# Patient Record
Sex: MALE | Race: WHITE | NOT HISPANIC OR LATINO | Employment: OTHER | URBAN - METROPOLITAN AREA
[De-identification: names, ages, dates, MRNs, and addresses within clinical notes are randomized per-mention and may not be internally consistent; named-entity substitution may affect disease eponyms.]

---

## 2017-01-17 RX ORDER — METOPROLOL SUCCINATE 50 MG/1
TABLET, EXTENDED RELEASE ORAL
Qty: 90 TABLET | Refills: 0 | Status: SHIPPED | OUTPATIENT
Start: 2017-01-17 | End: 2017-04-07 | Stop reason: SDUPTHER

## 2017-02-06 RX ORDER — LISINOPRIL 20 MG/1
TABLET ORAL
Qty: 90 TABLET | Refills: 0 | Status: SHIPPED | OUTPATIENT
Start: 2017-02-06 | End: 2017-04-07 | Stop reason: SDUPTHER

## 2017-02-06 RX ORDER — CLOPIDOGREL BISULFATE 75 MG/1
TABLET ORAL
Qty: 90 TABLET | Refills: 0 | Status: SHIPPED | OUTPATIENT
Start: 2017-02-06 | End: 2017-04-07 | Stop reason: SDUPTHER

## 2017-03-10 RX ORDER — SIMVASTATIN 40 MG
TABLET ORAL
Qty: 90 TABLET | Refills: 0 | Status: SHIPPED | OUTPATIENT
Start: 2017-03-10 | End: 2017-04-07 | Stop reason: SDUPTHER

## 2017-04-07 ENCOUNTER — OFFICE VISIT (OUTPATIENT)
Dept: FAMILY MEDICINE CLINIC | Facility: CLINIC | Age: 72
End: 2017-04-07

## 2017-04-07 VITALS
HEIGHT: 68 IN | BODY MASS INDEX: 24.25 KG/M2 | HEART RATE: 64 BPM | TEMPERATURE: 98.2 F | OXYGEN SATURATION: 99 % | RESPIRATION RATE: 16 BRPM | WEIGHT: 160 LBS | SYSTOLIC BLOOD PRESSURE: 160 MMHG | DIASTOLIC BLOOD PRESSURE: 80 MMHG

## 2017-04-07 DIAGNOSIS — E11.9 TYPE 2 DIABETES MELLITUS WITHOUT COMPLICATION, WITHOUT LONG-TERM CURRENT USE OF INSULIN (HCC): Primary | ICD-10-CM

## 2017-04-07 DIAGNOSIS — E78.2 MIXED HYPERLIPIDEMIA: ICD-10-CM

## 2017-04-07 DIAGNOSIS — R39.198 SLOW URINARY STREAM: ICD-10-CM

## 2017-04-07 DIAGNOSIS — N32.0 BLADDER NECK OBSTRUCTION: ICD-10-CM

## 2017-04-07 DIAGNOSIS — I10 ESSENTIAL HYPERTENSION: ICD-10-CM

## 2017-04-07 LAB
BILIRUB BLD-MCNC: NEGATIVE MG/DL
CLARITY, POC: CLEAR
COLOR UR: YELLOW
GLUCOSE UR STRIP-MCNC: NEGATIVE MG/DL
KETONES UR QL: ABNORMAL
LEUKOCYTE EST, POC: ABNORMAL
NITRITE UR-MCNC: NEGATIVE MG/ML
PH UR: 6 [PH] (ref 5–8)
PROT UR STRIP-MCNC: ABNORMAL MG/DL
RBC # UR STRIP: ABNORMAL /UL
SP GR UR: 1.03 (ref 1–1.03)
UROBILINOGEN UR QL: NORMAL

## 2017-04-07 PROCEDURE — 99214 OFFICE O/P EST MOD 30 MIN: CPT | Performed by: FAMILY MEDICINE

## 2017-04-07 PROCEDURE — 81003 URINALYSIS AUTO W/O SCOPE: CPT | Performed by: FAMILY MEDICINE

## 2017-04-07 RX ORDER — SIMVASTATIN 40 MG
40 TABLET ORAL NIGHTLY
Qty: 90 TABLET | Refills: 1 | Status: SHIPPED | OUTPATIENT
Start: 2017-04-07 | End: 2017-11-28 | Stop reason: SDUPTHER

## 2017-04-07 RX ORDER — CIPROFLOXACIN 500 MG/1
500 TABLET, FILM COATED ORAL 2 TIMES DAILY
Qty: 10 TABLET | Refills: 0 | Status: SHIPPED | OUTPATIENT
Start: 2017-04-07 | End: 2017-10-04

## 2017-04-07 RX ORDER — LANSOPRAZOLE 15 MG/1
15 CAPSULE, DELAYED RELEASE ORAL DAILY
Qty: 90 CAPSULE | Refills: 1 | Status: SHIPPED | OUTPATIENT
Start: 2017-04-07

## 2017-04-07 RX ORDER — LISINOPRIL 20 MG/1
20 TABLET ORAL DAILY
Qty: 90 TABLET | Refills: 1 | Status: SHIPPED | OUTPATIENT
Start: 2017-04-07 | End: 2017-11-11 | Stop reason: SDUPTHER

## 2017-04-07 RX ORDER — CLOPIDOGREL BISULFATE 75 MG/1
75 TABLET ORAL DAILY
Qty: 90 TABLET | Refills: 1 | Status: SHIPPED | OUTPATIENT
Start: 2017-04-07 | End: 2017-11-11 | Stop reason: SDUPTHER

## 2017-04-07 RX ORDER — METOPROLOL SUCCINATE 50 MG/1
50 TABLET, EXTENDED RELEASE ORAL DAILY
Qty: 90 TABLET | Refills: 1 | Status: SHIPPED | OUTPATIENT
Start: 2017-04-07 | End: 2017-10-06 | Stop reason: SDUPTHER

## 2017-04-07 NOTE — PATIENT INSTRUCTIONS
This is a very nice 71-year-old who is here for follow-up for his high blood pressure and his reflux and his diabetes who also has been experiencing a slow urinary stream lately.  I will request blood work and notify him when the results are available.

## 2017-04-07 NOTE — PROGRESS NOTES
Subjective   Niraj Hassan is a 71 y.o. male presenting with   Chief Complaint   Patient presents with   • Hypertension     6 mo check up    • Hyperlipidemia     6 mo check up    • Diabetes     6 mo check up    • Question     about BPH         HPI Comments: This is a 71-year-old white male smoker who has no interest in quitting who is here for follow-up for diabetes and high cholesterol and high blood pressure and coronary artery disease.  He tells me he is doing well without any problems with his current medication but he has noticed lately that he has a slow stream and some terminal dribbling.  However he does not want to have another prescription for any new medicine and he does not want to see a urologist at this time.  He has no family history of prostate or bladder cancer.    He says he feels well and does not have to get up at night to urinate.  He says his bowel movements are normal as well and he has not noticed any problem there.    Hypertension     Hyperlipidemia     Diabetes          The following portions of the patient's history were reviewed and updated as appropriate: current medications, past family history, past medical history, past social history, past surgical history and problem list.    Review of Systems   Genitourinary: Positive for difficulty urinating.   All other systems reviewed and are negative.      Objective   Physical Exam   Constitutional: He is oriented to person, place, and time. He appears well-developed and well-nourished. No distress.   HENT:   Head: Normocephalic and atraumatic.   Mouth/Throat: Oropharynx is clear and moist. No oropharyngeal exudate.   Eyes: EOM are normal. Pupils are equal, round, and reactive to light. No scleral icterus.   Neck: Normal range of motion. Neck supple. No thyromegaly present.   Cardiovascular: Normal rate, regular rhythm, normal heart sounds and intact distal pulses.  Exam reveals no friction rub.    No murmur heard.  Pulmonary/Chest: Effort  normal. No respiratory distress. He has no wheezes. He has rhonchi in the right upper field and the left upper field.   Abdominal: Soft. Bowel sounds are normal. He exhibits no distension and no mass. There is no tenderness. Hernia confirmed negative in the right inguinal area and confirmed negative in the left inguinal area.   Genitourinary: Prostate normal, testes normal and penis normal. Prostate is not enlarged and not tender. Right testis shows no mass and no tenderness. Left testis shows no mass and no tenderness. Circumcised.   Musculoskeletal: Normal range of motion. He exhibits no edema or tenderness.   Lymphadenopathy:     He has no cervical adenopathy. No inguinal adenopathy noted on the right or left side.   Neurological: He is alert and oriented to person, place, and time. No cranial nerve deficit. Coordination normal.   Skin: Skin is warm and dry. No rash noted. He is not diaphoretic. No pallor.   Psychiatric: He has a normal mood and affect. His behavior is normal.   Nursing note and vitals reviewed.      Assessment/Plan   Niraj was seen today for hypertension, hyperlipidemia, diabetes and question.    Diagnoses and all orders for this visit:    Type 2 diabetes mellitus without complication, without long-term current use of insulin  -     Comprehensive Metabolic Panel  -     Hemoglobin A1c    Slow urinary stream  -     PSA    Essential hypertension  -     Comprehensive Metabolic Panel  -     TSH    Mixed hyperlipidemia  -     Comprehensive Metabolic Panel    Bladder neck obstruction   -     PSA    Other orders  -     clopidogrel (PLAVIX) 75 MG tablet; Take 1 tablet by mouth Daily.  -     lansoprazole (PREVACID) 15 MG capsule; Take 1 capsule by mouth Daily.  -     lisinopril (PRINIVIL,ZESTRIL) 20 MG tablet; Take 1 tablet by mouth Daily.  -     metFORMIN (GLUCOPHAGE) 500 MG tablet; Take 1 tablet by mouth 2 (Two) Times a Day With Meals.  -     metoprolol succinate XL (TOPROL-XL) 50 MG 24 hr tablet; Take  1 tablet by mouth Daily.  -     simvastatin (ZOCOR) 40 MG tablet; Take 1 tablet by mouth Every Night.                   I would like him to return for another visit in 6 month(s)

## 2017-04-08 LAB
ALBUMIN SERPL-MCNC: 4.6 G/DL (ref 3.5–5.2)
ALBUMIN/GLOB SERPL: 1.6 G/DL
ALP SERPL-CCNC: 90 U/L (ref 39–117)
ALT SERPL-CCNC: 14 U/L (ref 1–41)
AST SERPL-CCNC: 15 U/L (ref 1–40)
BILIRUB SERPL-MCNC: 0.4 MG/DL (ref 0.1–1.2)
BUN SERPL-MCNC: 10 MG/DL (ref 8–23)
BUN/CREAT SERPL: 12.8 (ref 7–25)
CALCIUM SERPL-MCNC: 10.2 MG/DL (ref 8.6–10.5)
CHLORIDE SERPL-SCNC: 102 MMOL/L (ref 98–107)
CO2 SERPL-SCNC: 26.8 MMOL/L (ref 22–29)
CREAT SERPL-MCNC: 0.78 MG/DL (ref 0.76–1.27)
GLOBULIN SER CALC-MCNC: 2.9 GM/DL
GLUCOSE SERPL-MCNC: 135 MG/DL (ref 65–99)
HBA1C MFR BLD: 7.56 % (ref 4.8–5.6)
POTASSIUM SERPL-SCNC: 4 MMOL/L (ref 3.5–5.2)
PROT SERPL-MCNC: 7.5 G/DL (ref 6–8.5)
PSA SERPL-MCNC: 1.34 NG/ML (ref 0–4)
SODIUM SERPL-SCNC: 145 MMOL/L (ref 136–145)
TSH SERPL DL<=0.005 MIU/L-ACNC: 1.24 MIU/ML (ref 0.27–4.2)

## 2017-04-09 LAB
BACTERIA UR CULT: NO GROWTH
BACTERIA UR CULT: NORMAL

## 2017-04-10 NOTE — PROGRESS NOTES
Please call the patient regarding his abnormal result.  Spoke to pt informed him of his results he stated he understood

## 2017-07-21 ENCOUNTER — TELEPHONE (OUTPATIENT)
Dept: FAMILY MEDICINE CLINIC | Facility: CLINIC | Age: 72
End: 2017-07-21

## 2017-08-18 ENCOUNTER — TELEPHONE (OUTPATIENT)
Dept: FAMILY MEDICINE CLINIC | Facility: CLINIC | Age: 72
End: 2017-08-18

## 2017-08-18 NOTE — TELEPHONE ENCOUNTER
It should be one tablet twice daily, and when I went to renew it, that is what it shows in Epic.  I don't know how it ended up showing 4 tablets a day at the pharmacy.

## 2017-08-18 NOTE — TELEPHONE ENCOUNTER
Pt called and said that in the past you had him take his metformin bid. He went to  his prescription and now the directions say 2 tab bid, 4 total. He wanted to find out how many he should be taking daily?

## 2017-10-04 ENCOUNTER — OFFICE VISIT (OUTPATIENT)
Dept: FAMILY MEDICINE CLINIC | Facility: CLINIC | Age: 72
End: 2017-10-04

## 2017-10-04 VITALS
OXYGEN SATURATION: 98 % | BODY MASS INDEX: 23.64 KG/M2 | TEMPERATURE: 98.3 F | SYSTOLIC BLOOD PRESSURE: 130 MMHG | WEIGHT: 156 LBS | HEIGHT: 68 IN | HEART RATE: 76 BPM | DIASTOLIC BLOOD PRESSURE: 84 MMHG

## 2017-10-04 DIAGNOSIS — I10 ESSENTIAL HYPERTENSION: Primary | ICD-10-CM

## 2017-10-04 DIAGNOSIS — E78.2 MIXED HYPERLIPIDEMIA: ICD-10-CM

## 2017-10-04 DIAGNOSIS — E11.9 TYPE 2 DIABETES MELLITUS WITHOUT COMPLICATION, WITHOUT LONG-TERM CURRENT USE OF INSULIN (HCC): ICD-10-CM

## 2017-10-04 PROCEDURE — 99213 OFFICE O/P EST LOW 20 MIN: CPT | Performed by: FAMILY MEDICINE

## 2017-10-04 NOTE — PROGRESS NOTES
Subjective   Niraj Hassan is a 72 y.o. male presenting with   Chief Complaint   Patient presents with   • Hyperlipidemia     check up    • Hypertension     check up    • Diabetes     check up         HPI Comments: This is a 72-year-old white male smoker who is not motivated to quit at this time.  He is here for routine follow-up for high blood pressure and high cholesterol and diabetes.  He has eaten today so I cannot order a cholesterol panel.    He tells me that he does not have any side effects from his medication and that he feels well.  He has not had a colonoscopy, but he is willing to do a colon stool test    Hyperlipidemia     Hypertension     Diabetes          The following portions of the patient's history were reviewed and updated as appropriate: current medications, past family history, past medical history, past social history, past surgical history and problem list.    Review of Systems   All other systems reviewed and are negative.      Objective   Physical Exam   Constitutional: He is oriented to person, place, and time. He appears well-developed and well-nourished. No distress.   HENT:   Head: Normocephalic and atraumatic.   Eyes: EOM are normal. Pupils are equal, round, and reactive to light.   Neck: Normal range of motion. Neck supple. No JVD present. No thyromegaly present.   Cardiovascular: Normal rate, regular rhythm, normal heart sounds and intact distal pulses.    No murmur heard.  Pulmonary/Chest: Effort normal. No respiratory distress. He has no wheezes. He has rhonchi in the right upper field, the right middle field and the left upper field. He has no rales.   Abdominal: Soft. Bowel sounds are normal. He exhibits no distension. There is no tenderness.   Musculoskeletal: Normal range of motion. He exhibits no edema or tenderness.   Lymphadenopathy:     He has no cervical adenopathy.   Neurological: He is alert and oriented to person, place, and time. No cranial nerve deficit.  Coordination normal.   Skin: Skin is warm and dry. No rash noted. He is not diaphoretic.   Psychiatric: He has a normal mood and affect. His behavior is normal.   Nursing note and vitals reviewed.      Assessment/Plan   Niraj was seen today for hyperlipidemia, hypertension and diabetes.    Diagnoses and all orders for this visit:    Essential hypertension  -     Comprehensive Metabolic Panel  -     TSH    Mixed hyperlipidemia    Type 2 diabetes mellitus without complication, without long-term current use of insulin  -     Comprehensive Metabolic Panel  -     Hemoglobin A1c                   I would like him to return for another visit in 6 month(s)

## 2017-10-05 LAB
ALBUMIN SERPL-MCNC: 4.9 G/DL (ref 3.5–5.2)
ALBUMIN/GLOB SERPL: 1.6 G/DL
ALP SERPL-CCNC: 86 U/L (ref 39–117)
ALT SERPL-CCNC: 11 U/L (ref 1–41)
AST SERPL-CCNC: 14 U/L (ref 1–40)
BILIRUB SERPL-MCNC: 0.4 MG/DL (ref 0.1–1.2)
BUN SERPL-MCNC: 14 MG/DL (ref 8–23)
BUN/CREAT SERPL: 15.9 (ref 7–25)
CALCIUM SERPL-MCNC: 10.7 MG/DL (ref 8.6–10.5)
CHLORIDE SERPL-SCNC: 101 MMOL/L (ref 98–107)
CO2 SERPL-SCNC: 26.3 MMOL/L (ref 22–29)
CREAT SERPL-MCNC: 0.88 MG/DL (ref 0.76–1.27)
GLOBULIN SER CALC-MCNC: 3 GM/DL
GLUCOSE SERPL-MCNC: 121 MG/DL (ref 65–99)
HBA1C MFR BLD: 7 % (ref 4.8–5.6)
POTASSIUM SERPL-SCNC: 4.7 MMOL/L (ref 3.5–5.2)
PROT SERPL-MCNC: 7.9 G/DL (ref 6–8.5)
SODIUM SERPL-SCNC: 143 MMOL/L (ref 136–145)
TSH SERPL DL<=0.005 MIU/L-ACNC: 1.62 MIU/ML (ref 0.27–4.2)

## 2017-10-06 RX ORDER — METOPROLOL SUCCINATE 50 MG/1
TABLET, EXTENDED RELEASE ORAL
Qty: 90 TABLET | Refills: 1 | Status: SHIPPED | OUTPATIENT
Start: 2017-10-06 | End: 2018-04-06 | Stop reason: SDUPTHER

## 2017-11-13 RX ORDER — CLOPIDOGREL BISULFATE 75 MG/1
TABLET ORAL
Qty: 90 TABLET | Refills: 0 | Status: SHIPPED | OUTPATIENT
Start: 2017-11-13 | End: 2018-02-08 | Stop reason: SDUPTHER

## 2017-11-13 RX ORDER — LISINOPRIL 20 MG/1
TABLET ORAL
Qty: 90 TABLET | Refills: 0 | Status: SHIPPED | OUTPATIENT
Start: 2017-11-13 | End: 2018-02-08 | Stop reason: SDUPTHER

## 2017-11-20 ENCOUNTER — TELEPHONE (OUTPATIENT)
Dept: FAMILY MEDICINE CLINIC | Facility: CLINIC | Age: 72
End: 2017-11-20

## 2017-11-29 RX ORDER — SIMVASTATIN 40 MG
TABLET ORAL
Qty: 90 TABLET | Refills: 1 | Status: SHIPPED | OUTPATIENT
Start: 2017-11-29 | End: 2018-05-31 | Stop reason: SDUPTHER

## 2018-01-03 ENCOUNTER — OFFICE VISIT (OUTPATIENT)
Dept: CARDIOLOGY | Facility: CLINIC | Age: 73
End: 2018-01-03

## 2018-01-03 VITALS
DIASTOLIC BLOOD PRESSURE: 70 MMHG | HEART RATE: 64 BPM | SYSTOLIC BLOOD PRESSURE: 160 MMHG | BODY MASS INDEX: 24.71 KG/M2 | WEIGHT: 163 LBS | HEIGHT: 68 IN

## 2018-01-03 DIAGNOSIS — I25.10 CORONARY ARTERY DISEASE INVOLVING NATIVE CORONARY ARTERY OF NATIVE HEART WITHOUT ANGINA PECTORIS: Primary | ICD-10-CM

## 2018-01-03 DIAGNOSIS — I10 ESSENTIAL HYPERTENSION: ICD-10-CM

## 2018-01-03 DIAGNOSIS — E11.59 TYPE 2 DIABETES MELLITUS WITH OTHER CIRCULATORY COMPLICATION, WITHOUT LONG-TERM CURRENT USE OF INSULIN (HCC): ICD-10-CM

## 2018-01-03 DIAGNOSIS — E78.49 OTHER HYPERLIPIDEMIA: ICD-10-CM

## 2018-01-03 PROCEDURE — 99214 OFFICE O/P EST MOD 30 MIN: CPT | Performed by: INTERNAL MEDICINE

## 2018-01-03 PROCEDURE — 93000 ELECTROCARDIOGRAM COMPLETE: CPT | Performed by: INTERNAL MEDICINE

## 2018-01-03 NOTE — PROGRESS NOTES
Date of Office Visit: 18  Encounter Provider: Brad Swenson MD  Place of Service: [facility] CARDIOLOGY  Patient Name: Niraj Hassan  :1945      Chief Complaint   Patient presents with   • Coronary artery disease involving native coronary artery of      Yearly follow up     History of Present Illness  HPI Comments: The patient is a very pleasant 72-year-old gentleman with a history of previous lateral  wall infarction and primary angioplasty to the circumflex. Repeat catheterization in  10/1998 showed no restenosis. He then had a positive perfusion study in 2006. Cardiac  catheterization showed normal left ventricular systolic function, mild distal left main  stenosis, mild disease of the circumflex and chronically occluded right coronary artery  with collaterals, treated medically. He then had a non-ST elevation infarct, troponin  increasing to 19. This happened around the time he had an abscessed tooth. Repeat  cardiac catheterization at that time showed chronic occluded right coronary artery, total  occlusion of the circumflex which is new, and an ejection fraction of 30%, but so far out  from his infarct that he was treated medically. He had a PET perfusion in 2010 which  was compatible with his disease. He now comes in for follow up.  The patient denies chest pain, pressure and heaviness. No shortness of breath, othopnea or PND. No palpitations, near syncope or syncope. No stroke type symptoms like paralysis, paresthesia, abrupt vision loss and dysarthria. No bleeding like blood in the stool or dark stools.   He still smoking occasionally he is walking on a treadmill to 3 times a week about a mile or 20 minutes.    Coronary Artery Disease   Pertinent negatives include no dizziness, muscle weakness or weight gain. His past medical history is significant for past myocardial infarction.         Past Medical History:   Diagnosis Date   • CAD (coronary artery  disease)    • Diabetes    • GERD (gastroesophageal reflux disease)    • Hyperlipidemia    • Hypertension    • Myocardial infarction    • PVD (peripheral vascular disease)          Past Surgical History:   Procedure Laterality Date   • CARDIAC CATHETERIZATION      showed total occlusion of the RCA, mild left main disease, and moderate disease of the circumflex.   • CORONARY ANGIOPLASTY WITH STENT PLACEMENT      angioplasty and stent placement to the circumflex         Current Outpatient Prescriptions on File Prior to Visit   Medication Sig Dispense Refill   • aspirin 325 MG tablet Take  by mouth daily.     • cetirizine (ZyrTEC) 10 MG tablet Take  by mouth daily.     • clopidogrel (PLAVIX) 75 MG tablet TAKE ONE TABLET BY MOUTH DAILY 90 tablet 0   • lansoprazole (PREVACID) 15 MG capsule Take 1 capsule by mouth Daily. 90 capsule 1   • lisinopril (PRINIVIL,ZESTRIL) 20 MG tablet TAKE ONE TABLET BY MOUTH DAILY 90 tablet 0   • Melatonin 10 MG capsule Take  by mouth.     • metFORMIN (GLUCOPHAGE) 500 MG tablet Take 1 tablet by mouth 2 (Two) Times a Day With Meals. 180 tablet 1   • metFORMIN (GLUCOPHAGE) 500 MG tablet TAKE TWO TABLETS BY MOUTH TWICE A DAY WITH MEALS 120 tablet 2   • metoprolol succinate XL (TOPROL-XL) 50 MG 24 hr tablet TAKE ONE TABLET BY MOUTH DAILY 90 tablet 1   • Misc Natural Products (TART CHERRY ADVANCED) capsule Take  by mouth daily.     • Multiple Vitamin (MULTIVITAMIN) capsule Take  by mouth daily.     • neomycin-polymyxin-hydrocortisone (CORTISPORIN) 3.5-29810-6 otic solution Administer 3 drops to the right ear 4 (Four) Times a Day. 10 mL 0   • Omega-3 Fatty Acids (FISH OIL) 1200 MG capsule capsule Take  by mouth.     • simvastatin (ZOCOR) 40 MG tablet TAKE 1 TABLET BY MOUTH EVERY NIGHT 90 tablet 1   • Triamcinolone Acetonide (NASACORT) 55 MCG/ACT nasal inhaler 2 sprays into each nostril daily.       No current facility-administered medications on file prior to visit.          Social History      Social History   • Marital status:      Spouse name: N/A   • Number of children: N/A   • Years of education: N/A     Occupational History   • Not on file.     Social History Main Topics   • Smoking status: Current Some Day Smoker   • Smokeless tobacco: Not on file   • Alcohol use No      Comment: Caffeine use   • Drug use: No   • Sexual activity: Not on file     Other Topics Concern   • Not on file     Social History Narrative       Family History   Problem Relation Age of Onset   • Heart attack Mother    • Cancer Mother    • Heart disease Mother      PACEMAKER   • Heart attack Father          Review of Systems   Constitution: Negative for decreased appetite, diaphoresis, fever, weakness, malaise/fatigue, weight gain and weight loss.   HENT: Negative for congestion, hearing loss, nosebleeds and tinnitus.    Eyes: Negative for blurred vision, double vision, vision loss in left eye, vision loss in right eye and visual disturbance.   Cardiovascular:        As noted in HPI   Respiratory:        As noted HPI   Endocrine: Negative for cold intolerance and heat intolerance.   Hematologic/Lymphatic: Negative for bleeding problem. Does not bruise/bleed easily.   Skin: Negative for color change, flushing, itching and rash.   Musculoskeletal: Negative for arthritis, back pain, joint pain, joint swelling, muscle weakness and myalgias.   Gastrointestinal: Negative for bloating, abdominal pain, constipation, diarrhea, dysphagia, heartburn, hematemesis, hematochezia, melena, nausea and vomiting.   Genitourinary: Negative for bladder incontinence, dysuria, frequency, nocturia and urgency.   Neurological: Negative for dizziness, focal weakness, headaches, light-headedness, loss of balance, numbness, paresthesias and vertigo.   Psychiatric/Behavioral: Negative for depression, memory loss and substance abuse.       Procedures      ECG 12 Lead  Date/Time: 1/3/2018 1:42 PM  Performed by: MIRACLE GHOTRA  Authorized by:  "MIRACLE GHOTRA   Comparison: compared with previous ECG   Similar to previous ECG  Rhythm: sinus rhythm  Rate: normal  Conduction: incomplete RBBB  QRS axis: normal                 Objective:    /70 (BP Location: Right arm)  Pulse 64  Ht 172.7 cm (68\")  Wt 73.9 kg (163 lb)  BMI 24.78 kg/m2       Physical Exam  Physical Exam   Constitutional: He is oriented to person, place, and time. He appears well-developed and well-nourished. No distress.   HENT:   Head: Normocephalic.   Eyes: Conjunctivae are normal. Pupils are equal, round, and reactive to light. No scleral icterus.   Neck: Normal carotid pulses, no hepatojugular reflux and no JVD present. Carotid bruit is not present. No tracheal deviation, no edema and no erythema present. No thyromegaly present.   Cardiovascular: Normal rate, regular rhythm, S1 normal, S2 normal, normal heart sounds and intact distal pulses.   No extrasystoles are present. PMI is not displaced.  Exam reveals no gallop, no distant heart sounds and no friction rub.    No murmur heard.  Pulses:       Carotid pulses are 2+ on the right side, and 2+ on the left side.       Radial pulses are 2+ on the right side, and 2+ on the left side.        Femoral pulses are 2+ on the right side, and 2+ on the left side.       Dorsalis pedis pulses are 2+ on the right side, and 2+ on the left side.        Posterior tibial pulses are 2+ on the right side, and 2+ on the left side.   Pulmonary/Chest: Effort normal and breath sounds normal. No respiratory distress. He has no decreased breath sounds. He has no wheezes. He has no rhonchi. He has no rales. He exhibits no tenderness.   Abdominal: Soft. Bowel sounds are normal. He exhibits no distension and no mass. There is no hepatosplenomegaly. There is no tenderness. There is no rebound and no guarding.   Musculoskeletal: He exhibits no edema, tenderness or deformity.   Neurological: He is alert and oriented to person, place, and time.   Skin: Skin is " warm and dry. No rash noted. He is not diaphoretic. No cyanosis or erythema. No pallor. Nails show no clubbing.   Psychiatric: He has a normal mood and affect. His speech is normal and behavior is normal. Judgment and thought content normal.           Assessment:   1. This is a 72-year-old gentleman with a history of previous lateral wall infarction, primary angioplasty of the circumflex. Followup catheterization in 08/2006 showed total  occlusive right carotid artery, mild left main disease, and mild disease of the circumflex. Then, recurrent non-ST elevation in 11/2009. Catheterization showed moderate  left anterior dysfunction with a left ejection fraction of 30%, insignificant disease of the LAD, total occlusion of the proximal circumflex, and total occlusion of the right  coronary artery. The circumflex was a new occlusion. Then, he had a PET perfusion study in 8/13 that was unchanged.   Coronary Artery Disease  Assessment  • The patient has no angina    Plan  • Lifestyle modifications discussed include adhering to a heart healthy diet, avoidance of tobacco products, maintenance of a healthy weight, medication compliance, regular exercise and regular monitoring of cholesterol and blood pressure    Subjective - Objective  • There is a history of past MI  • There has been a previous POBA  • Current antiplatelet therapy includes aspirin 81 mg and clopidogrel 75 mg    He seems to be doing well. He is to continue the same and see us in follow up in one year. He is going to continue to try to stop cigarette smoking and increase his exercise regimen.     2. Hyperlipidemia.   he is to have follow-up lipids by his PCP in April of this year.  Target LDL of 70 or less.  If not at that goal would consider switching to atorvastatin.  3. History of peripheral vascular disease, bilateral iliac disease, occlusion of the right superficial femoral artery, moderate disease of the left superficial femoral artery. He is  doing well  with no claudication. Continue the same.  Maintain him on clopidogrel indefinitely.  4. Hypertension. Blood pressure is typically controlled he says.     Plan:

## 2018-02-08 RX ORDER — LISINOPRIL 20 MG/1
TABLET ORAL
Qty: 90 TABLET | Refills: 0 | Status: SHIPPED | OUTPATIENT
Start: 2018-02-08 | End: 2018-02-14

## 2018-02-08 RX ORDER — CLOPIDOGREL BISULFATE 75 MG/1
TABLET ORAL
Qty: 90 TABLET | Refills: 0 | Status: SHIPPED | OUTPATIENT
Start: 2018-02-08 | End: 2018-05-10 | Stop reason: SDUPTHER

## 2018-02-14 ENCOUNTER — OFFICE VISIT (OUTPATIENT)
Dept: FAMILY MEDICINE CLINIC | Facility: CLINIC | Age: 73
End: 2018-02-14

## 2018-02-14 VITALS
OXYGEN SATURATION: 97 % | SYSTOLIC BLOOD PRESSURE: 154 MMHG | HEART RATE: 72 BPM | BODY MASS INDEX: 24.43 KG/M2 | DIASTOLIC BLOOD PRESSURE: 82 MMHG | WEIGHT: 161.2 LBS | HEIGHT: 68 IN

## 2018-02-14 DIAGNOSIS — I10 ESSENTIAL HYPERTENSION: Primary | ICD-10-CM

## 2018-02-14 DIAGNOSIS — E11.9 TYPE 2 DIABETES MELLITUS WITHOUT COMPLICATION, WITHOUT LONG-TERM CURRENT USE OF INSULIN (HCC): ICD-10-CM

## 2018-02-14 DIAGNOSIS — M54.31 SCIATICA OF RIGHT SIDE: ICD-10-CM

## 2018-02-14 PROCEDURE — 99213 OFFICE O/P EST LOW 20 MIN: CPT | Performed by: FAMILY MEDICINE

## 2018-02-14 RX ORDER — LISINOPRIL AND HYDROCHLOROTHIAZIDE 20; 12.5 MG/1; MG/1
1 TABLET ORAL DAILY
Qty: 30 TABLET | Refills: 5 | Status: SHIPPED | OUTPATIENT
Start: 2018-02-14 | End: 2018-05-14 | Stop reason: SDUPTHER

## 2018-02-14 RX ORDER — METHYLPREDNISOLONE 4 MG/1
TABLET ORAL
Qty: 21 TABLET | Refills: 0 | Status: SHIPPED | OUTPATIENT
Start: 2018-02-14 | End: 2018-04-13

## 2018-02-14 NOTE — PROGRESS NOTES
Subjective   Niraj Hassan is a 72 y.o. male presenting with   Chief Complaint   Patient presents with   • Leg Pain     Stabbing pain on occasion       Had sciatica many years ago that left numb are in leg   That area has enlarged         HPI Comments: 72-year-old white male smoker who is not motivated at this time to quit, but comes in today for complaint of right sided stabbing pain radiating from the buttock down the posterior right leg with a numb area over the lateral right thigh.  He said he had a really bad episode of sciatica many years ago and for 3 months was in a lot of pain.  He says he hopes he can avert that this time around.    He tells me he is taking his blood pressure medicine regularly without any problems or side effects, but his blood pressure is a little elevated today.  I took his blood pressure and his right arm sitting and got 154/82 and then took it in his left arm and got 156/80.  He says that he was surprised by the blood pressure reading initially gotten here, but he had just walked down the long solorio.  He also would smoke a cigarette right before he came up to the office.  I did tell him that cigarettes elevate blood pressure in addition to all the other problems the cause.    Leg Pain    Associated symptoms include numbness (right lateral thigh numbness).        The following portions of the patient's history were reviewed and updated as appropriate: current medications, past family history, past medical history, past social history, past surgical history and problem list.    Review of Systems   Neurological: Positive for numbness (right lateral thigh numbness).   All other systems reviewed and are negative.      Objective   Physical Exam   Constitutional: He is oriented to person, place, and time. He appears well-developed and well-nourished. No distress.   HENT:   Head: Normocephalic and atraumatic.   Eyes: EOM are normal. Pupils are equal, round, and reactive to light.   Neck:  Normal range of motion. Neck supple. No JVD present. No thyromegaly present.   Cardiovascular: Normal rate, regular rhythm, normal heart sounds and intact distal pulses.    No murmur heard.  Pulmonary/Chest: Effort normal. No respiratory distress. He has no wheezes. He has rhonchi in the right upper field and the left upper field.   Musculoskeletal: He exhibits tenderness (he has mild tenderness to firm palpation over the right gluteal area but negative straight leg raising). He exhibits no edema or deformity.   Lymphadenopathy:     He has no cervical adenopathy.   Neurological: He is alert and oriented to person, place, and time. No cranial nerve deficit. Coordination normal.   Skin: Skin is warm and dry. He is not diaphoretic.   Psychiatric: He has a normal mood and affect. His behavior is normal.   Nursing note and vitals reviewed.      Assessment/Plan   Niraj was seen today for leg pain.    Diagnoses and all orders for this visit:    Essential hypertension  -     Comprehensive Metabolic Panel  -     TSH    Type 2 diabetes mellitus without complication, without long-term current use of insulin  -     Comprehensive Metabolic Panel  -     Hemoglobin A1c    Sciatica of right side    Other orders  -     lisinopril-hydrochlorothiazide (ZESTORETIC) 20-12.5 MG per tablet; Take 1 tablet by mouth Daily.  -     MethylPREDNISolone (MEDROL, SHANTANU,) 4 MG tablet; Take as directed on package instructions.                   I would like him to return for another visit in 6 month(s)

## 2018-02-14 NOTE — PATIENT INSTRUCTIONS
This is a very nice 72-year-old who is here for treatment of his sciatica but also is here for follow-up for his blood pressure and blood sugar.  I will request blood work and notify him when the results are available.  I would like him to call if there is any problem.      Also, I got a blood pressure reading of 154/82 which is still not ideal, so I will ask him to discontinue the lisinopril and I will send out lisinopril with hydrochlorothiazide instead.

## 2018-02-15 LAB
ALBUMIN SERPL-MCNC: 4.5 G/DL (ref 3.5–5.2)
ALBUMIN/GLOB SERPL: 1.7 G/DL
ALP SERPL-CCNC: 90 U/L (ref 39–117)
ALT SERPL-CCNC: 16 U/L (ref 1–41)
AST SERPL-CCNC: 17 U/L (ref 1–40)
BILIRUB SERPL-MCNC: 0.3 MG/DL (ref 0.1–1.2)
BUN SERPL-MCNC: 16 MG/DL (ref 8–23)
BUN/CREAT SERPL: 20.3 (ref 7–25)
CALCIUM SERPL-MCNC: 9.7 MG/DL (ref 8.6–10.5)
CHLORIDE SERPL-SCNC: 101 MMOL/L (ref 98–107)
CO2 SERPL-SCNC: 25 MMOL/L (ref 22–29)
CREAT SERPL-MCNC: 0.79 MG/DL (ref 0.76–1.27)
GFR SERPLBLD CREATININE-BSD FMLA CKD-EPI: 117 ML/MIN/1.73
GFR SERPLBLD CREATININE-BSD FMLA CKD-EPI: 97 ML/MIN/1.73
GLOBULIN SER CALC-MCNC: 2.6 GM/DL
GLUCOSE SERPL-MCNC: 155 MG/DL (ref 65–99)
HBA1C MFR BLD: 7.74 % (ref 4.8–5.6)
POTASSIUM SERPL-SCNC: 4.3 MMOL/L (ref 3.5–5.2)
PROT SERPL-MCNC: 7.1 G/DL (ref 6–8.5)
SODIUM SERPL-SCNC: 142 MMOL/L (ref 136–145)
TSH SERPL DL<=0.005 MIU/L-ACNC: 1.6 MIU/ML (ref 0.27–4.2)

## 2018-04-06 RX ORDER — METOPROLOL SUCCINATE 50 MG/1
TABLET, EXTENDED RELEASE ORAL
Qty: 90 TABLET | Refills: 3 | Status: SHIPPED | OUTPATIENT
Start: 2018-04-06 | End: 2019-04-10 | Stop reason: SDUPTHER

## 2018-05-10 RX ORDER — LISINOPRIL 20 MG/1
TABLET ORAL
Qty: 90 TABLET | Refills: 0 | Status: SHIPPED | OUTPATIENT
Start: 2018-05-10 | End: 2018-08-20 | Stop reason: CLARIF

## 2018-05-10 RX ORDER — CLOPIDOGREL BISULFATE 75 MG/1
TABLET ORAL
Qty: 90 TABLET | Refills: 0 | Status: SHIPPED | OUTPATIENT
Start: 2018-05-10 | End: 2018-08-20 | Stop reason: SDUPTHER

## 2018-05-14 ENCOUNTER — TELEPHONE (OUTPATIENT)
Dept: FAMILY MEDICINE CLINIC | Facility: CLINIC | Age: 73
End: 2018-05-14

## 2018-05-14 RX ORDER — LISINOPRIL AND HYDROCHLOROTHIAZIDE 20; 12.5 MG/1; MG/1
1 TABLET ORAL DAILY
Qty: 30 TABLET | Refills: 5 | Status: SHIPPED | OUTPATIENT
Start: 2018-05-14 | End: 2019-01-29 | Stop reason: SDUPTHER

## 2018-05-14 NOTE — TELEPHONE ENCOUNTER
[facility] called and said that lisinopril 20mg was filled but in February it was changed to lisinopril-hctz   could you please clarify this with dr. Linton

## 2018-05-31 RX ORDER — SIMVASTATIN 40 MG
TABLET ORAL
Qty: 90 TABLET | Refills: 1 | Status: SHIPPED | OUTPATIENT
Start: 2018-05-31 | End: 2018-11-30 | Stop reason: SDUPTHER

## 2018-08-09 RX ORDER — CLOPIDOGREL BISULFATE 75 MG/1
TABLET ORAL
Qty: 90 TABLET | Refills: 0 | Status: SHIPPED | OUTPATIENT
Start: 2018-08-09 | End: 2018-11-04 | Stop reason: SDUPTHER

## 2018-08-20 ENCOUNTER — OFFICE VISIT (OUTPATIENT)
Dept: FAMILY MEDICINE CLINIC | Facility: CLINIC | Age: 73
End: 2018-08-20

## 2018-08-20 VITALS
RESPIRATION RATE: 18 BRPM | OXYGEN SATURATION: 100 % | WEIGHT: 156 LBS | SYSTOLIC BLOOD PRESSURE: 128 MMHG | TEMPERATURE: 97.5 F | HEIGHT: 68 IN | DIASTOLIC BLOOD PRESSURE: 70 MMHG | HEART RATE: 68 BPM | BODY MASS INDEX: 23.64 KG/M2

## 2018-08-20 DIAGNOSIS — Z00.00 MEDICARE ANNUAL WELLNESS VISIT, INITIAL: Primary | ICD-10-CM

## 2018-08-20 DIAGNOSIS — E11.9 TYPE 2 DIABETES MELLITUS WITHOUT COMPLICATION, WITHOUT LONG-TERM CURRENT USE OF INSULIN (HCC): ICD-10-CM

## 2018-08-20 DIAGNOSIS — I10 ESSENTIAL HYPERTENSION: ICD-10-CM

## 2018-08-20 DIAGNOSIS — E78.2 MIXED HYPERLIPIDEMIA: ICD-10-CM

## 2018-08-20 PROCEDURE — G0439 PPPS, SUBSEQ VISIT: HCPCS | Performed by: FAMILY MEDICINE

## 2018-08-20 PROCEDURE — 96160 PT-FOCUSED HLTH RISK ASSMT: CPT | Performed by: FAMILY MEDICINE

## 2018-08-20 NOTE — PROGRESS NOTES
QUICK REFERENCE INFORMATION:  The ABCs of the Annual Wellness Visit    Subsequent Medicare Wellness Visit    HEALTH RISK ASSESSMENT    1945    Recent Hospitalizations:  No hospitalization(s) within the last year..        Current Medical Providers:  Patient Care Team:  Richard Linton MD as PCP - General        Smoking Status:  History   Smoking Status   • Current Some Day Smoker   Smokeless Tobacco   • Never Used       Alcohol Consumption:  History   Alcohol Use   • Yes     Comment: / socially       caffine also       Depression Screen:   PHQ-2/PHQ-9 Depression Screening 8/20/2018   Little interest or pleasure in doing things 0   Feeling down, depressed, or hopeless 0   Total Score 0       Health Habits and Functional and Cognitive Screening:  Functional & Cognitive Status 8/20/2018   Do you have difficulty preparing food and eating? No   Do you have difficulty bathing yourself, getting dressed or grooming yourself? No   Do you have difficulty using the toilet? No   Do you have difficulty moving around from place to place? No   Do you have trouble with steps or getting out of a bed or a chair? No   In the past year have you fallen or experienced a near fall? No   Current Diet Well Balanced Diet   Dental Exam Up to date   Eye Exam Up to date   Exercise (times per week) 7 times per week   Do you need help using the phone?  No   Are you deaf or do you have serious difficulty hearing?  No   Do you need help with transportation? No   Do you need help shopping? No   Do you need help preparing meals?  No   Do you need help with housework?  No   Do you need help with laundry? No   Do you need help taking your medications? No   Do you need help managing money? No   Do you ever drive or ride in a car without wearing a seat belt? No   Have you felt unusual stress, anger or loneliness in the last month? No   Who do you live with? Alone   If you need help, do you have trouble finding someone available to you? No   Have  you been bothered in the last four weeks by sexual problems? No   Do you have difficulty concentrating, remembering or making decisions? No           Does the patient have evidence of cognitive impairment? No    Aspirin use counseling: Taking ASA appropriately as indicated      Recent Lab Results:  CMP:  Lab Results   Component Value Date     (H) 02/14/2018    BUN 16 02/14/2018    CREATININE 0.79 02/14/2018    EGFRIFNONA 97 02/14/2018    EGFRIFAFRI 117 02/14/2018    BCR 20.3 02/14/2018     02/14/2018    K 4.3 02/14/2018    CO2 25.0 02/14/2018    CALCIUM 9.7 02/14/2018    PROTENTOTREF 7.1 02/14/2018    ALBUMIN 4.50 02/14/2018    LABGLOBREF 2.6 02/14/2018    LABIL2 1.7 02/14/2018    BILITOT 0.3 02/14/2018    ALKPHOS 90 02/14/2018    AST 17 02/14/2018    ALT 16 02/14/2018     Lipid Panel:  Lab Results   Component Value Date    TRIG 228 (H) 03/18/2015    HDL 34 (L) 03/18/2015    VLDL 46 03/18/2015    LDLHDL 1.8 03/18/2015     HbA1c:  Lab Results   Component Value Date    HGBA1C 7.74 (H) 02/14/2018       Visual Acuity:  No exam data present    Age-appropriate Screening Schedule:  Refer to the list below for future screening recommendations based on patient's age, sex and/or medical conditions. Orders for these recommended tests are listed in the plan section. The patient has been provided with a written plan.    Health Maintenance   Topic Date Due   • URINE MICROALBUMIN  1945   • TDAP/TD VACCINES (1 - Tdap) 07/24/1965   • ZOSTER VACCINE (1 of 2) 07/24/1996   • PNEUMOCOCCAL VACCINES (65+ LOW/MEDIUM RISK) (1 of 2 - PCV13) 07/24/2011   • DIABETIC FOOT EXAM  02/16/2016   • COLONOSCOPY  03/21/2016   • LIPID PANEL  09/19/2016   • HEMOGLOBIN A1C  08/14/2018   • INFLUENZA VACCINE  08/01/2018   • DIABETIC EYE EXAM  04/09/2019        Subjective   History of Present Illness    Niraj Hassan is a 73 y.o. male who presents for an Subsequent Wellness Visit.    The following portions of the patient's history were  reviewed and updated as appropriate: current medications, past family history, past medical history, past social history, past surgical history and problem list.    Outpatient Medications Prior to Visit   Medication Sig Dispense Refill   • cetirizine (ZyrTEC) 10 MG tablet Take  by mouth daily.     • clopidogrel (PLAVIX) 75 MG tablet TAKE ONE TABLET BY MOUTH DAILY 90 tablet 0   • lansoprazole (PREVACID) 15 MG capsule Take 1 capsule by mouth Daily. 90 capsule 1   • lisinopril-hydrochlorothiazide (ZESTORETIC) 20-12.5 MG per tablet Take 1 tablet by mouth Daily. 30 tablet 5   • Melatonin 10 MG capsule Take  by mouth.     • metFORMIN (GLUCOPHAGE) 500 MG tablet Take 1 tablet by mouth 2 (Two) Times a Day With Meals. 180 tablet 1   • metoprolol succinate XL (TOPROL-XL) 50 MG 24 hr tablet TAKE ONE TABLET BY MOUTH DAILY 90 tablet 3   • Misc Natural Products (TART CHERRY ADVANCED) capsule Take  by mouth daily.     • Multiple Vitamin (MULTIVITAMIN) capsule Take  by mouth daily.     • Omega-3 Fatty Acids (FISH OIL) 1200 MG capsule capsule Take  by mouth.     • simvastatin (ZOCOR) 40 MG tablet TAKE ONE TABLET BY MOUTH ONCE NIGHTLY 90 tablet 1   • Triamcinolone Acetonide (NASACORT) 55 MCG/ACT nasal inhaler 2 sprays into each nostril daily.     • aspirin 325 MG tablet Take  by mouth daily.     • clopidogrel (PLAVIX) 75 MG tablet TAKE ONE TABLET BY MOUTH DAILY 90 tablet 0   • lisinopril (PRINIVIL,ZESTRIL) 20 MG tablet TAKE ONE TABLET BY MOUTH DAILY 90 tablet 0     No facility-administered medications prior to visit.        Patient Active Problem List   Diagnosis   • Essential hypertension   • Mixed hyperlipidemia   • Type 2 diabetes mellitus without complication, without long-term current use of insulin (CMS/[facility])   • Slow urinary stream   • Sciatica of right side   • Medicare annual wellness visit, initial       Advance Care Planning:  has NO advance directive - information provided to the patient today    Identification of Risk  "Factors:  Risk factors include: cardiovascular risk and tobacco use.    Review of Systems   All other systems reviewed and are negative.      Compared to one year ago, the patient feels his physical health is the same.  Compared to one year ago, the patient feels his mental health is the same.    Objective     Physical Exam   Constitutional: He is oriented to person, place, and time. He appears well-developed and well-nourished.   HENT:   Head: Normocephalic and atraumatic.   Mouth/Throat: Oropharynx is clear and moist. No oropharyngeal exudate.   Eyes: Pupils are equal, round, and reactive to light. EOM are normal. No scleral icterus.   Neck: Normal range of motion. Neck supple. No JVD present. No thyromegaly present.   Cardiovascular: Normal rate, regular rhythm, normal heart sounds and intact distal pulses.    No murmur heard.  Pulmonary/Chest: Effort normal. No respiratory distress. He has wheezes. He has no rales. He exhibits no tenderness.   Abdominal: Soft. Bowel sounds are normal. He exhibits no distension.   Musculoskeletal: Normal range of motion. He exhibits no edema or tenderness.   Lymphadenopathy:     He has no cervical adenopathy.   Neurological: He is alert and oriented to person, place, and time.   Skin: Skin is warm and dry.   Psychiatric: He has a normal mood and affect. His behavior is normal.   Nursing note and vitals reviewed.      Vitals:    08/20/18 1257   BP: 128/70   Pulse: 68   Resp: 18   Temp: 97.5 °F (36.4 °C)   SpO2: 100%   Weight: 70.8 kg (156 lb)   Height: 172.7 cm (68\")   PainSc: 0-No pain       Patient's Body mass index is 23.72 kg/m². BMI is within normal parameters. No follow-up required.      Assessment/Plan   Patient Self-Management and Personalized Health Advice  The patient has been provided with information about: diet, exercise, tobacco cessation and designing advance directives and preventive services including:   · Advance directive, Diabetes screening, see lab orders, " Smoking cessation counseling completed.    Visit Diagnoses:    ICD-10-CM ICD-9-CM   1. Medicare annual wellness visit, initial Z00.00 V70.0   2. Essential hypertension I10 401.9   3. Mixed hyperlipidemia E78.2 272.2   4. Type 2 diabetes mellitus without complication, without long-term current use of insulin (CMS/[facility]) E11.9 250.00       Orders Placed This Encounter   Procedures   • Comprehensive Metabolic Panel   • Lipid Panel With LDL / HDL Ratio   • TSH   • Hemoglobin A1c   • Hepatitis C antibody       Outpatient Encounter Prescriptions as of 8/20/2018   Medication Sig Dispense Refill   • cetirizine (ZyrTEC) 10 MG tablet Take  by mouth daily.     • clopidogrel (PLAVIX) 75 MG tablet TAKE ONE TABLET BY MOUTH DAILY 90 tablet 0   • lansoprazole (PREVACID) 15 MG capsule Take 1 capsule by mouth Daily. 90 capsule 1   • lisinopril-hydrochlorothiazide (ZESTORETIC) 20-12.5 MG per tablet Take 1 tablet by mouth Daily. 30 tablet 5   • Melatonin 10 MG capsule Take  by mouth.     • metFORMIN (GLUCOPHAGE) 500 MG tablet Take 1 tablet by mouth 2 (Two) Times a Day With Meals. 180 tablet 1   • metoprolol succinate XL (TOPROL-XL) 50 MG 24 hr tablet TAKE ONE TABLET BY MOUTH DAILY 90 tablet 3   • Misc Natural Products (TART CHERRY ADVANCED) capsule Take  by mouth daily.     • Multiple Vitamin (MULTIVITAMIN) capsule Take  by mouth daily.     • Omega-3 Fatty Acids (FISH OIL) 1200 MG capsule capsule Take  by mouth.     • simvastatin (ZOCOR) 40 MG tablet TAKE ONE TABLET BY MOUTH ONCE NIGHTLY 90 tablet 1   • Triamcinolone Acetonide (NASACORT) 55 MCG/ACT nasal inhaler 2 sprays into each nostril daily.     • aspirin 325 MG tablet Take  by mouth daily.     • [DISCONTINUED] clopidogrel (PLAVIX) 75 MG tablet TAKE ONE TABLET BY MOUTH DAILY 90 tablet 0   • [DISCONTINUED] lisinopril (PRINIVIL,ZESTRIL) 20 MG tablet TAKE ONE TABLET BY MOUTH DAILY 90 tablet 0     No facility-administered encounter medications on file as of 8/20/2018.        Reviewed use  of high risk medication in the elderly: not applicable  Reviewed for potential of harmful drug interactions in the elderly: not applicable    Follow Up:  Return in about 6 months (around 2/20/2019) for Recheck.     An After Visit Summary and PPPS with all of these plans were given to the patient.

## 2018-08-20 NOTE — PATIENT INSTRUCTIONS
This is a very nice 73-year-old who is here for his Medicare visit and also follow-up for his cholesterol and blood pressure.  I will request blood work and notify him when the results are available.

## 2018-08-21 LAB
ALBUMIN SERPL-MCNC: 4.8 G/DL (ref 3.5–5.2)
ALBUMIN/GLOB SERPL: 2.1 G/DL
ALP SERPL-CCNC: 81 U/L (ref 39–117)
ALT SERPL-CCNC: 12 U/L (ref 1–41)
AST SERPL-CCNC: 9 U/L (ref 1–40)
BILIRUB SERPL-MCNC: 0.3 MG/DL (ref 0.1–1.2)
BUN SERPL-MCNC: 15 MG/DL (ref 8–23)
BUN/CREAT SERPL: 16.5 (ref 7–25)
CALCIUM SERPL-MCNC: 9.4 MG/DL (ref 8.6–10.5)
CHLORIDE SERPL-SCNC: 102 MMOL/L (ref 98–107)
CHOLEST SERPL-MCNC: 175 MG/DL (ref 0–200)
CO2 SERPL-SCNC: 24.2 MMOL/L (ref 22–29)
CREAT SERPL-MCNC: 0.91 MG/DL (ref 0.76–1.27)
GLOBULIN SER CALC-MCNC: 2.3 GM/DL
GLUCOSE SERPL-MCNC: 186 MG/DL (ref 65–99)
HBA1C MFR BLD: 7.43 % (ref 4.8–5.6)
HCV AB S/CO SERPL IA: <0.1 S/CO RATIO (ref 0–0.9)
HDLC SERPL-MCNC: 34 MG/DL (ref 40–60)
LDLC SERPL CALC-MCNC: 86 MG/DL (ref 0–100)
LDLC/HDLC SERPL: 2.52 {RATIO}
POTASSIUM SERPL-SCNC: 4.3 MMOL/L (ref 3.5–5.2)
PROT SERPL-MCNC: 7.1 G/DL (ref 6–8.5)
SODIUM SERPL-SCNC: 143 MMOL/L (ref 136–145)
TRIGL SERPL-MCNC: 277 MG/DL (ref 0–150)
TSH SERPL DL<=0.005 MIU/L-ACNC: 1.6 MIU/ML (ref 0.27–4.2)
VLDLC SERPL CALC-MCNC: 55.4 MG/DL (ref 5–40)

## 2018-11-05 RX ORDER — CLOPIDOGREL BISULFATE 75 MG/1
TABLET ORAL
Qty: 90 TABLET | Refills: 0 | Status: SHIPPED | OUTPATIENT
Start: 2018-11-05 | End: 2019-02-06 | Stop reason: SDUPTHER

## 2018-11-30 RX ORDER — SIMVASTATIN 40 MG
TABLET ORAL
Qty: 90 TABLET | Refills: 0 | Status: SHIPPED | OUTPATIENT
Start: 2018-11-30 | End: 2019-03-06 | Stop reason: SDUPTHER

## 2019-01-10 ENCOUNTER — OFFICE VISIT (OUTPATIENT)
Dept: CARDIOLOGY | Facility: CLINIC | Age: 74
End: 2019-01-10

## 2019-01-10 VITALS
DIASTOLIC BLOOD PRESSURE: 80 MMHG | BODY MASS INDEX: 23.49 KG/M2 | SYSTOLIC BLOOD PRESSURE: 134 MMHG | HEART RATE: 60 BPM | WEIGHT: 155 LBS | HEIGHT: 68 IN

## 2019-01-10 DIAGNOSIS — E11.59 TYPE 2 DIABETES MELLITUS WITH OTHER CIRCULATORY COMPLICATION, WITHOUT LONG-TERM CURRENT USE OF INSULIN (HCC): ICD-10-CM

## 2019-01-10 DIAGNOSIS — E78.2 MIXED HYPERLIPIDEMIA: ICD-10-CM

## 2019-01-10 DIAGNOSIS — I25.10 CORONARY ARTERY DISEASE INVOLVING NATIVE CORONARY ARTERY OF NATIVE HEART WITHOUT ANGINA PECTORIS: Primary | ICD-10-CM

## 2019-01-10 DIAGNOSIS — I73.9 PVD (PERIPHERAL VASCULAR DISEASE) WITH CLAUDICATION (HCC): ICD-10-CM

## 2019-01-10 DIAGNOSIS — I10 ESSENTIAL HYPERTENSION: ICD-10-CM

## 2019-01-10 PROCEDURE — 93000 ELECTROCARDIOGRAM COMPLETE: CPT | Performed by: INTERNAL MEDICINE

## 2019-01-10 PROCEDURE — 99214 OFFICE O/P EST MOD 30 MIN: CPT | Performed by: INTERNAL MEDICINE

## 2019-01-10 NOTE — PROGRESS NOTES
Date of Office Visit: 01/10/19  Encounter Provider: Brad Swenson MD  Place of Service: [facility] CARDIOLOGY  Patient Name: Niraj Hassan  :1945  Referral Provider:Brad Swenson MD      No chief complaint on file.    History of Present Illness  The patient is a very pleasant 73-year-old gentleman with a history of previous lateral  wall infarction and primary angioplasty to the circumflex. Repeat catheterization in  10/1998 showed no restenosis. He then had a positive perfusion study in 2006. Cardiac  catheterization showed normal left ventricular systolic function, mild distal left main  stenosis, mild disease of the circumflex and chronically occluded right coronary artery  with collaterals, treated medically. He then had a non-ST elevation infarct, troponin  increasing to 19. This happened around the time he had an abscessed tooth. Repeat  cardiac catheterization at that time showed chronic occluded right coronary artery, total  occlusion of the circumflex which is new, and an ejection fraction of 30%, but so far out  from his infarct that he was treated medically. He had a PET perfusion in 2010 which  was compatible with his disease. He now comes in for follow up.  The patient denies chest pain, pressure and heaviness. No shortness of breath, othopnea or PND. No palpitations, near syncope or syncope. No stroke type symptoms like paralysis, paresthesia, abrupt vision loss and dysarthria. No bleeding like blood in the stool or dark stools.  Overall he feels like he's doing great.  Single an A1c is been up a little bit at 7.4 things at home are good.  But he's also noted some left calf discomfort with activity says is very mild and doesn't really bother him a lot.      Coronary Artery Disease   Pertinent negatives include no dizziness, muscle weakness or weight gain. His past medical history is significant for past myocardial infarction.         Past Medical  History:   Diagnosis Date   • CAD (coronary artery disease)    • GERD (gastroesophageal reflux disease)    • Myocardial infarction (CMS/HCC)    • PVD (peripheral vascular disease) (CMS/HCC)          Past Surgical History:   Procedure Laterality Date   • CARDIAC CATHETERIZATION      showed total occlusion of the RCA, mild left main disease, and moderate disease of the circumflex.   • CORONARY ANGIOPLASTY WITH STENT PLACEMENT      angioplasty and stent placement to the circumflex         Current Outpatient Medications on File Prior to Visit   Medication Sig Dispense Refill   • aspirin 325 MG tablet Take  by mouth daily.     • cetirizine (ZyrTEC) 10 MG tablet Take  by mouth daily.     • clopidogrel (PLAVIX) 75 MG tablet TAKE ONE TABLET BY MOUTH DAILY 90 tablet 0   • lansoprazole (PREVACID) 15 MG capsule Take 1 capsule by mouth Daily. 90 capsule 1   • lisinopril-hydrochlorothiazide (ZESTORETIC) 20-12.5 MG per tablet Take 1 tablet by mouth Daily. 30 tablet 5   • Melatonin 10 MG capsule Take  by mouth.     • metFORMIN (GLUCOPHAGE) 500 MG tablet Take 1 tablet by mouth 2 (Two) Times a Day With Meals. 180 tablet 1   • metFORMIN (GLUCOPHAGE) 500 MG tablet TAKE ONE TABLET BY MOUTH TWICE A DAY WITH MEALS 180 tablet 3   • metoprolol succinate XL (TOPROL-XL) 50 MG 24 hr tablet TAKE ONE TABLET BY MOUTH DAILY 90 tablet 3   • Misc Natural Products (TART CHERRY ADVANCED) capsule Take  by mouth daily.     • Multiple Vitamin (MULTIVITAMIN) capsule Take  by mouth daily.     • Omega-3 Fatty Acids (FISH OIL) 1200 MG capsule capsule Take  by mouth.     • simvastatin (ZOCOR) 40 MG tablet TAKE ONE TABLET BY MOUTH ONCE NIGHTLY 90 tablet 0   • Triamcinolone Acetonide (NASACORT) 55 MCG/ACT nasal inhaler 2 sprays into each nostril daily.       No current facility-administered medications on file prior to visit.          Social History     Socioeconomic History   • Marital status:      Spouse name: Not on file   • Number of children: Not  on file   • Years of education: Not on file   • Highest education level: Not on file   Social Needs   • Financial resource strain: Not on file   • Food insecurity - worry: Not on file   • Food insecurity - inability: Not on file   • Transportation needs - medical: Not on file   • Transportation needs - non-medical: Not on file   Occupational History   • Not on file   Tobacco Use   • Smoking status: Current Some Day Smoker   • Smokeless tobacco: Never Used   Substance and Sexual Activity   • Alcohol use: Yes     Comment: / socially       caffine also   • Drug use: No   • Sexual activity: Not on file   Other Topics Concern   • Not on file   Social History Narrative   • Not on file       Family History   Problem Relation Age of Onset   • Heart attack Mother    • Cancer Mother    • Heart disease Mother         PACEMAKER   • Heart attack Father          Review of Systems   Constitution: Negative for decreased appetite, diaphoresis, fever, weakness, malaise/fatigue, weight gain and weight loss.   HENT: Negative for congestion, hearing loss, nosebleeds and tinnitus.    Eyes: Negative for blurred vision, double vision, vision loss in left eye, vision loss in right eye and visual disturbance.   Cardiovascular:        As noted in HPI   Respiratory:        As noted HPI   Endocrine: Negative for cold intolerance and heat intolerance.   Hematologic/Lymphatic: Negative for bleeding problem. Does not bruise/bleed easily.   Skin: Negative for color change, flushing, itching and rash.   Musculoskeletal: Positive for myalgias. Negative for arthritis, back pain, joint pain, joint swelling and muscle weakness.   Gastrointestinal: Negative for bloating, abdominal pain, constipation, diarrhea, dysphagia, heartburn, hematemesis, hematochezia, melena, nausea and vomiting.   Genitourinary: Negative for bladder incontinence, dysuria, frequency, nocturia and urgency.   Neurological: Negative for dizziness, focal weakness, headaches,  light-headedness, loss of balance, numbness, paresthesias and vertigo.   Psychiatric/Behavioral: Negative for depression, memory loss and substance abuse.       Procedures      ECG 12 Lead  Date/Time: 1/10/2019 1:30 PM  Performed by: Brad Swenson MD  Authorized by: Brad Swenson MD   Comparison: compared with previous ECG   Similar to previous ECG  Rhythm: sinus rhythm  Rate: normal  Conduction: non-specific intraventricular conduction delay  QRS axis: normal                  Objective:    There were no vitals taken for this visit.       Physical Exam  Physical Exam   Constitutional: He is oriented to person, place, and time. He appears well-developed and well-nourished. No distress.   HENT:   Head: Normocephalic.   Eyes: Conjunctivae are normal. Pupils are equal, round, and reactive to light. No scleral icterus.   Neck: Normal carotid pulses, no hepatojugular reflux and no JVD present. Carotid bruit is not present. No tracheal deviation, no edema and no erythema present. No thyromegaly present.   Cardiovascular: Normal rate, regular rhythm, S1 normal, S2 normal, normal heart sounds and intact distal pulses.  No extrasystoles are present. PMI is not displaced. Exam reveals no gallop, no distant heart sounds and no friction rub.   No murmur heard.  Pulses:       Carotid pulses are 2+ on the right side, and 2+ on the left side.       Radial pulses are 2+ on the right side, and 2+ on the left side.        Femoral pulses are 2+ on the right side, and 2+ on the left side.       Dorsalis pedis pulses are 1+ on the right side, and 1+ on the left side.        Posterior tibial pulses are 1+ on the right side, and 1+ on the left side.   Pulmonary/Chest: Effort normal and breath sounds normal. No respiratory distress. He has no decreased breath sounds. He has no wheezes. He has no rhonchi. He has no rales. He exhibits no tenderness.   Abdominal: Soft. Bowel sounds are normal. He exhibits no distension and no mass.  There is no hepatosplenomegaly. There is no tenderness. There is no rebound and no guarding.   Musculoskeletal: He exhibits no edema, tenderness or deformity.   Neurological: He is alert and oriented to person, place, and time.   Skin: Skin is warm and dry. No rash noted. He is not diaphoretic. No cyanosis or erythema. No pallor. Nails show no clubbing.   Psychiatric: He has a normal mood and affect. His speech is normal and behavior is normal. Judgment and thought content normal.           Assessment:   1. This is a 73-year-old gentleman with a history of previous lateral wall infarction, primary angioplasty of the circumflex. Followup catheterization in 08/2006 showed total  occlusive right carotid artery, mild left main disease, and mild disease of the circumflex. Then, recurrent non-ST elevation in 11/2009. Catheterization showed moderate  left anterior dysfunction with a left ejection fraction of 30%, insignificant disease of the LAD, total occlusion of the proximal circumflex, and total occlusion of the right  coronary artery. The circumflex was a new occlusion. Then, he had a PET perfusion study in 8/13 that was unchanged.   Coronary Artery Disease  Assessment  • The patient has no angina    Plan  • Lifestyle modifications discussed include adhering to a heart healthy diet, avoidance of tobacco products, maintenance of a healthy weight, medication compliance, regular exercise and regular monitoring of cholesterol and blood pressure    Subjective - Objective  • There is a history of past MI  • There has been a previous POBA  • Current antiplatelet therapy includes aspirin 81 mg and clopidogrel 75 mg    No angina he's to continue the same.     2. Hyperlipidemia.  LDL 8/18 not at goal was 86.  His current Myalgia would hold off till that's resolved.  3. History of peripheral vascular disease, bilateral iliac disease, occlusion of the right superficial femoral artery, moderate disease of the left superficial  femoral artery.  Now with some worsening left calf discomfort may have worsening disease.  He's agreed to return for lower extremity arterial study make recommendations pending that.  4. Hypertension. Blood pressure is adequately controlled he says.   5.  Diabetes mellitus following with his PCP.         Plan:

## 2019-01-11 ENCOUNTER — TELEPHONE (OUTPATIENT)
Dept: CARDIOLOGY | Facility: CLINIC | Age: 74
End: 2019-01-11

## 2019-01-11 ENCOUNTER — HOSPITAL ENCOUNTER (OUTPATIENT)
Dept: CARDIOLOGY | Facility: HOSPITAL | Age: 74
Discharge: HOME | End: 2019-01-11
Attending: INTERNAL MEDICINE | Admitting: INTERNAL MEDICINE

## 2019-01-11 LAB
BH CV LOWER ARTERIAL LEFT ABI RATIO: 0.6
BH CV LOWER ARTERIAL LEFT HIGH THIGH SYS MAX: 207 MMHG
BH CV LOWER ARTERIAL LEFT LOW THIGH SYS MAX: 158 MMHG
BH CV LOWER ARTERIAL LEFT POPLITEAL SYS MAX: 106 MMHG
BH CV LOWER ARTERIAL LEFT POST TIBIAL SYS MAX: 89 MMHG
BH CV LOWER ARTERIAL RIGHT ABI RATIO: 0.97
BH CV LOWER ARTERIAL RIGHT HIGH THIGH SYS MAX: 155 MMHG
BH CV LOWER ARTERIAL RIGHT LOW THIGH SYS MAX: 174 MMHG
BH CV LOWER ARTERIAL RIGHT POPLITEAL SYS MAX: 162 MMHG
BH CV LOWER ARTERIAL RIGHT POST TIBIAL SYS MAX: 144 MMHG
UPPER ARTERIAL LEFT ARM BRACHIAL SYS MAX: 148 MMHG
UPPER ARTERIAL RIGHT ARM BRACHIAL SYS MAX: 142 MMHG

## 2019-01-11 PROCEDURE — 93923 UPR/LXTR ART STDY 3+ LVLS: CPT

## 2019-01-11 PROCEDURE — 93923 UPR/LXTR ART STDY 3+ LVLS: CPT | Performed by: INTERNAL MEDICINE

## 2019-01-11 NOTE — TELEPHONE ENCOUNTER
Niraj Hassan  Male, 73 y.o., 1945  PCP:   Richard Linton MD  Language:   English  Need Interp:   No  Last Weight:   70.3 kg (155 lb)  Phone:   M: [phone] H: [phone]  Allergies  No Known Allergies  Health Maintenance:   Due  FYI:   General  Primary Ins.:   [employer] MEDICARE REPLACEMENT  MRN:   [id number]  MyChart:   Code Exp  Pharmacy:   [address] - [phone] [facility] [phone] FX [[id number]]  Preferred Lab:   None  Next Appt with Me:   None  Next Appt Date by Dept:   02/22/2019        PACS Images      Radiology Images   Doppler Arterial Multi Level Lower Extremity - Bilateral CAR   Order: [id number]   Status:  Final result   Visible to patient:  No (Not Released) Dx:  PVD (peripheral vascular disease) wit...   Details     Reading Physician Reading Date Result Priority   Brad Swenson MD 1/11/2019 Routine      Result Text   ·   Mild obstructive disease involving the right superficial femoral/popliteal arterial system.  · Severe obstructive disease involving the left superficial femoral/popliteal arterial system.            All Measurements                                                                             [facility] OUTPATIENT ECHOCARDIOGRAPHY  [address]  [address]  [facility] [facility] [address] [phone]      Study Impression     • Right Femoral: triphasic arterial flow noted.   • Right Popliteal: triphasic arterial flow noted .  • Right Posterior Tibial: biphasic arterial flow noted.   • Right Dorsalis Pedis: biphasic arterial flow noted.     • Left Femoral: biphasic arterial flow noted.   • Left Popliteal: biphasic arterial flow noted.   • Left Posterior Tibial: biphasic arterial flow noted.   PACS Images      Show images for Doppler Arterial Multi Level Lower Extremity - Bilateral CAR         Specimen Collected: 01/11/19 08:04 Last Resulted: 01/11/19 09:32        Order Details        View Encounter       Lab and Collection Details       Routing       Result History            Status of Other Orders     Completed     ECG 12 Lead  01/10/19          Encounter Notes      All notes         Routing History     Priority Sent On From To Message Type    1/11/2019  9:34 AM Brad Swenson MD Moore, Thomas L., MD Results    1/11/2019  9:34 AM Brad Swenson MD Imburgia, Michael, MD Results    Reviewed By Richard Bond MD on 1/11/2019 09:36

## 2019-01-24 ENCOUNTER — OFFICE VISIT (OUTPATIENT)
Dept: CARDIOLOGY | Facility: CLINIC | Age: 74
End: 2019-01-24

## 2019-01-24 VITALS
HEART RATE: 45 BPM | WEIGHT: 153 LBS | DIASTOLIC BLOOD PRESSURE: 90 MMHG | SYSTOLIC BLOOD PRESSURE: 144 MMHG | HEIGHT: 68 IN | BODY MASS INDEX: 23.19 KG/M2

## 2019-01-24 DIAGNOSIS — E78.2 MIXED HYPERLIPIDEMIA: Primary | ICD-10-CM

## 2019-01-24 DIAGNOSIS — I10 ESSENTIAL HYPERTENSION: ICD-10-CM

## 2019-01-24 DIAGNOSIS — R68.89 ABNORMAL ANKLE BRACHIAL INDEX (ABI): ICD-10-CM

## 2019-01-24 DIAGNOSIS — I73.9 CLAUDICATION (HCC): ICD-10-CM

## 2019-01-24 PROCEDURE — 99214 OFFICE O/P EST MOD 30 MIN: CPT | Performed by: INTERNAL MEDICINE

## 2019-01-24 NOTE — PROGRESS NOTES
Niraj Hassan  1945  Date of Office Visit: 01/24/2019  Encounter Provider: Shamir Manriquez MD  Place of Service: [facility] CARDIOLOGY      CHIEF COMPLAINT:   Claudication  Peripheral arterial disease  Abnormal CAT    HISTORY OF PRESENT ILLNESS:  I had the pleasure of seeing Mr. Niraj Hassan in consultation today. As you well know, Mr. Hassan is a pleasant 73-year-old male with a medical history of prior lateral wall myocardial infarction, angioplasty to circumflex, repeat catheterization in 2006 with just mild disease of the circumflex and  of the RCA with left to right collaterals. He was noted to have a circumflex infarction following that and his circumflex was noted to be occluded. Unfortunately, due to the timing of this he was treated medically. He came back in to see you in clinic about 2 weeks ago and at that point in time noted some left calf discomfort with activity.     His main complaints are that when he walks greater than 200-300 feet he will have burning and cramping of his left lower extremity. His right lower extremity is fine. These symptoms have previously been described as mild and resolve very quickly with rest. He has no non-healing ulcerations or rest pain.         Review of Systems   Constitution: Negative for fever, weakness and malaise/fatigue.   HENT: Negative for nosebleeds and sore throat.    Eyes: Negative for blurred vision and double vision.   Cardiovascular: Negative for chest pain, claudication, palpitations and syncope.   Respiratory: Negative for cough, shortness of breath and snoring.    Endocrine: Negative for cold intolerance, heat intolerance and polydipsia.   Skin: Negative for itching, poor wound healing and rash.   Musculoskeletal: Negative for joint pain, joint swelling, muscle weakness and myalgias.   Gastrointestinal: Negative for abdominal pain, melena, nausea and vomiting.   Neurological: Negative for light-headedness,  "loss of balance, seizures and vertigo.   Psychiatric/Behavioral: Negative for altered mental status and depression.        Past Medical History:   Diagnosis Date   • CAD (coronary artery disease)    • GERD (gastroesophageal reflux disease)    • Myocardial infarction (CMS/HCC)    • PVD (peripheral vascular disease) (CMS/HCC)        The following portions of the patient's history were reviewed and updated as appropriate: Social history , Family history and Surgical history     Current Outpatient Medications on File Prior to Visit   Medication Sig Dispense Refill   • cetirizine (ZyrTEC) 10 MG tablet Take  by mouth daily.     • clopidogrel (PLAVIX) 75 MG tablet TAKE ONE TABLET BY MOUTH DAILY 90 tablet 0   • lansoprazole (PREVACID) 15 MG capsule Take 1 capsule by mouth Daily. 90 capsule 1   • lisinopril-hydrochlorothiazide (ZESTORETIC) 20-12.5 MG per tablet Take 1 tablet by mouth Daily. 30 tablet 5   • Melatonin 10 MG capsule Take  by mouth.     • metFORMIN (GLUCOPHAGE) 500 MG tablet Take 1 tablet by mouth 2 (Two) Times a Day With Meals. 180 tablet 1   • metoprolol succinate XL (TOPROL-XL) 50 MG 24 hr tablet TAKE ONE TABLET BY MOUTH DAILY 90 tablet 3   • Misc Natural Products (TART CHERRY ADVANCED) capsule Take  by mouth daily.     • Multiple Vitamin (MULTIVITAMIN) capsule Take  by mouth daily.     • Omega-3 Fatty Acids (FISH OIL) 1200 MG capsule capsule Take  by mouth.     • simvastatin (ZOCOR) 40 MG tablet TAKE ONE TABLET BY MOUTH ONCE NIGHTLY 90 tablet 0   • Triamcinolone Acetonide (NASACORT) 55 MCG/ACT nasal inhaler 2 sprays into each nostril daily.     • [DISCONTINUED] aspirin 325 MG tablet Take  by mouth daily.       No current facility-administered medications on file prior to visit.        No Known Allergies    Vitals:    01/24/19 0924   BP: 144/90   Pulse: (!) 45   Weight: 69.4 kg (153 lb)   Height: 172.7 cm (68\")     Physical Exam   Constitutional: He is oriented to person, place, and time. He appears " well-developed and well-nourished.   HENT:   Head: Normocephalic and atraumatic.   Eyes: Conjunctivae and EOM are normal. No scleral icterus.   Neck: Normal range of motion. Neck supple. Normal carotid pulses, no hepatojugular reflux and no JVD present. Carotid bruit is not present. No tracheal deviation present. No thyromegaly present.   Cardiovascular: Normal rate and regular rhythm. Exam reveals no gallop and no friction rub.   No murmur heard.  Pulses:       Carotid pulses are 2+ on the right side, and 2+ on the left side.       Radial pulses are 2+ on the right side, and 2+ on the left side.        Femoral pulses are 2+ on the right side, and 2+ on the left side.       Dorsalis pedis pulses are 2+ on the right side, and 2+ on the left side.        Posterior tibial pulses are 2+ on the right side, and 2+ on the left side.   Pulmonary/Chest: Breath sounds normal. No respiratory distress. He has no decreased breath sounds. He has no wheezes. He has no rhonchi. He has no rales. He exhibits no tenderness.   Abdominal: Soft. Bowel sounds are normal. He exhibits no distension. There is no tenderness. There is no rebound.   Musculoskeletal: He exhibits no edema or deformity.   Neurological: He is alert and oriented to person, place, and time. He has normal strength. No sensory deficit.   Skin: No rash noted. No erythema.   Psychiatric: He has a normal mood and affect. His behavior is normal.       No components found for: CBC  No results found for: CMP  No components found for: LIPID  No results found for: BMP    Procedures   1/11/19  · Mild obstructive disease involving the right superficial femoral/popliteal arterial system.  · Severe obstructive disease involving the left superficial femoral/popliteal arterial system.    DISCUSSION/SUMMARYA 73-year-old male with medical history of peripheral arterial disease with right-sided fem-pop bypass, CAD with chronic total occlusions of the circumflex and RCA, ischemic  cardiomyopathy, who was admitted to me for evaluation of claudication. He has no rest pain. His claudication symptoms are moderate in intensity and tend to come on with 200-300 feet of ambulation.     It appears that he has a distal SFA to popliteal disease on his CAT. His pulses on his left foot are Dopplerable.     Although he does have chronic systolic heart failure he has not had any issues with worsening of his ejection fraction or volume overload. I do think it is reasonable to give him a trial of Pletal regardless of his heart failure and discontinue his aspirin. I will leave him on Plavix and Pletal for a month to 2 months and he will give me a call back to let me know if his symptoms have improved. If not, I will recommend he undergo revascularization in the cath lab.

## 2019-01-25 ENCOUNTER — TELEPHONE (OUTPATIENT)
Dept: CARDIOLOGY | Facility: CLINIC | Age: 74
End: 2019-01-25

## 2019-01-25 RX ORDER — CILOSTAZOL 100 MG/1
100 TABLET ORAL 2 TIMES DAILY
COMMUNITY
End: 2019-01-25 | Stop reason: SDUPTHER

## 2019-01-25 RX ORDER — CILOSTAZOL 100 MG/1
100 TABLET ORAL 2 TIMES DAILY
Qty: 60 TABLET | Refills: 1 | Status: SHIPPED | OUTPATIENT
Start: 2019-01-25 | End: 2019-08-23 | Stop reason: ALTCHOICE

## 2019-01-25 NOTE — TELEPHONE ENCOUNTER
Pt called stating after yesterday's appointment he was to receive a prescription for Pletal, pt states RX states they did not receive prescript, sent prescript to [facility]. Thanks, Dee

## 2019-01-30 RX ORDER — LISINOPRIL AND HYDROCHLOROTHIAZIDE 20; 12.5 MG/1; MG/1
1 TABLET ORAL DAILY
Qty: 30 TABLET | Refills: 5 | Status: SHIPPED | OUTPATIENT
Start: 2019-01-30 | End: 2019-02-04 | Stop reason: SDUPTHER

## 2019-02-04 RX ORDER — LISINOPRIL AND HYDROCHLOROTHIAZIDE 20; 12.5 MG/1; MG/1
1 TABLET ORAL DAILY
Qty: 30 TABLET | Refills: 5 | Status: SHIPPED | OUTPATIENT
Start: 2019-02-04 | End: 2019-09-11 | Stop reason: SDUPTHER

## 2019-02-06 ENCOUNTER — TELEPHONE (OUTPATIENT)
Dept: FAMILY MEDICINE CLINIC | Facility: CLINIC | Age: 74
End: 2019-02-06

## 2019-02-06 RX ORDER — CLOPIDOGREL BISULFATE 75 MG/1
75 TABLET ORAL DAILY
Qty: 90 TABLET | Refills: 1 | Status: SHIPPED | OUTPATIENT
Start: 2019-02-06 | End: 2019-05-16 | Stop reason: SDUPTHER

## 2019-02-22 ENCOUNTER — OFFICE VISIT (OUTPATIENT)
Dept: FAMILY MEDICINE CLINIC | Facility: CLINIC | Age: 74
End: 2019-02-22

## 2019-02-22 VITALS
WEIGHT: 154.9 LBS | OXYGEN SATURATION: 99 % | HEART RATE: 62 BPM | SYSTOLIC BLOOD PRESSURE: 141 MMHG | BODY MASS INDEX: 23.55 KG/M2 | DIASTOLIC BLOOD PRESSURE: 71 MMHG | TEMPERATURE: 97.7 F

## 2019-02-22 DIAGNOSIS — I10 ESSENTIAL HYPERTENSION: Primary | ICD-10-CM

## 2019-02-22 DIAGNOSIS — E78.2 MIXED HYPERLIPIDEMIA: ICD-10-CM

## 2019-02-22 DIAGNOSIS — I73.9 CLAUDICATION (HCC): ICD-10-CM

## 2019-02-22 DIAGNOSIS — E11.9 TYPE 2 DIABETES MELLITUS WITHOUT COMPLICATION, WITHOUT LONG-TERM CURRENT USE OF INSULIN (HCC): ICD-10-CM

## 2019-02-22 LAB
ALBUMIN SERPL-MCNC: 4.6 G/DL (ref 3.5–5.2)
ALBUMIN/GLOB SERPL: 1.8 G/DL
ALP SERPL-CCNC: 84 U/L (ref 39–117)
ALT SERPL-CCNC: 11 U/L (ref 1–41)
AST SERPL-CCNC: 9 U/L (ref 1–40)
BILIRUB SERPL-MCNC: 0.3 MG/DL (ref 0.1–1.2)
BUN SERPL-MCNC: 15 MG/DL (ref 8–23)
BUN/CREAT SERPL: 16.1 (ref 7–25)
CALCIUM SERPL-MCNC: 10.1 MG/DL (ref 8.6–10.5)
CHLORIDE SERPL-SCNC: 100 MMOL/L (ref 98–107)
CHOLEST SERPL-MCNC: 154 MG/DL (ref 0–200)
CO2 SERPL-SCNC: 26.6 MMOL/L (ref 22–29)
CREAT SERPL-MCNC: 0.93 MG/DL (ref 0.76–1.27)
GLOBULIN SER CALC-MCNC: 2.6 GM/DL
GLUCOSE SERPL-MCNC: 176 MG/DL (ref 65–99)
HBA1C MFR BLD: 7.8 % (ref 4.8–5.6)
HDLC SERPL-MCNC: 32 MG/DL (ref 40–60)
LDLC SERPL CALC-MCNC: 74 MG/DL (ref 0–100)
LDLC/HDLC SERPL: 2.3 {RATIO}
POTASSIUM SERPL-SCNC: 4 MMOL/L (ref 3.5–5.2)
PROT SERPL-MCNC: 7.2 G/DL (ref 6–8.5)
SODIUM SERPL-SCNC: 142 MMOL/L (ref 136–145)
TRIGL SERPL-MCNC: 242 MG/DL (ref 0–150)
TSH SERPL DL<=0.005 MIU/L-ACNC: 1.94 MIU/ML (ref 0.27–4.2)
VLDLC SERPL CALC-MCNC: 48.4 MG/DL (ref 5–40)

## 2019-02-22 PROCEDURE — 99213 OFFICE O/P EST LOW 20 MIN: CPT | Performed by: FAMILY MEDICINE

## 2019-02-22 NOTE — PROGRESS NOTES
"Subjective   Niraj Hassan is a 73 y.o. male presenting with   Chief Complaint   Patient presents with   • Diabetes     pt is fasting labs         73-year-old white male smoker who is not motivated at all to quit, is here now for follow-up.  He tells me that he is doing well except he does have claudication in the left leg if he walks very far.  He has had a bypass in the right leg and was told that he would probably need angioplasty in the left leg.  His vascular surgeon suggested that he take Pletal, but did not stop him from taking Plavix.  The pharmacist was very concerned about him being on both of them, so this patient had decided to just stay with the Plavix.    He had cologuard last year that was negative so he does not need colon testing this year.  He is due for multiple vaccines but says that he \"does not believe in them\", so he declined to have any vaccines today.         The following portions of the patient's history were reviewed and updated as appropriate: current medications, past family history, past medical history, past social history, past surgical history and problem list.    Review of Systems   Musculoskeletal: Positive for gait problem (claudication and left leg when walking).   All other systems reviewed and are negative.      Objective   Physical Exam   Constitutional: He is oriented to person, place, and time. He appears well-developed and well-nourished.   HENT:   Head: Normocephalic and atraumatic.   Mouth/Throat: Oropharynx is clear and moist.   Eyes: EOM are normal. Pupils are equal, round, and reactive to light.   Neck: Normal range of motion. Neck supple. No thyromegaly present.   Cardiovascular: Normal rate, regular rhythm, normal heart sounds and intact distal pulses.   No murmur heard.  Pulmonary/Chest: Effort normal. No stridor. No respiratory distress. He has no wheezes. He has rhonchi in the right upper field and the left upper field.   Musculoskeletal: Normal range of " motion. He exhibits no edema or tenderness.   Lymphadenopathy:     He has no cervical adenopathy.   Neurological: He is alert and oriented to person, place, and time. He has normal strength. No cranial nerve deficit or sensory deficit (normal sensation in feet). Coordination normal.   Skin: Skin is warm and dry.   Psychiatric: He has a normal mood and affect. His behavior is normal.   Nursing note and vitals reviewed.      Assessment/Plan   Niraj was seen today for diabetes.    Diagnoses and all orders for this visit:    Essential hypertension  -     Comprehensive Metabolic Panel  -     TSH    Mixed hyperlipidemia  -     Comprehensive Metabolic Panel  -     Lipid Panel With LDL / HDL Ratio    Type 2 diabetes mellitus without complication, without long-term current use of insulin (CMS/HCC)  -     Comprehensive Metabolic Panel  -     Hemoglobin A1c    Claudication (CMS/HCC)                   I would like him to return for another visit in 6 month(s)

## 2019-03-06 ENCOUNTER — TELEPHONE (OUTPATIENT)
Dept: FAMILY MEDICINE CLINIC | Facility: CLINIC | Age: 74
End: 2019-03-06

## 2019-03-06 RX ORDER — SIMVASTATIN 40 MG
40 TABLET ORAL NIGHTLY
Qty: 90 TABLET | Refills: 1 | Status: SHIPPED | OUTPATIENT
Start: 2019-03-06 | End: 2019-05-16 | Stop reason: SDUPTHER

## 2019-04-10 RX ORDER — METOPROLOL SUCCINATE 50 MG/1
TABLET, EXTENDED RELEASE ORAL
Qty: 30 TABLET | Refills: 0 | Status: SHIPPED | OUTPATIENT
Start: 2019-04-10 | End: 2019-05-08 | Stop reason: SDUPTHER

## 2019-05-08 RX ORDER — METOPROLOL SUCCINATE 50 MG/1
TABLET, EXTENDED RELEASE ORAL
Qty: 30 TABLET | Refills: 3 | Status: SHIPPED | OUTPATIENT
Start: 2019-05-08 | End: 2019-11-23 | Stop reason: SDUPTHER

## 2019-05-16 RX ORDER — CLOPIDOGREL BISULFATE 75 MG/1
TABLET ORAL
Qty: 90 TABLET | Refills: 0 | Status: SHIPPED | OUTPATIENT
Start: 2019-05-16 | End: 2019-09-25 | Stop reason: SDUPTHER

## 2019-05-16 RX ORDER — SIMVASTATIN 40 MG
TABLET ORAL
Qty: 90 TABLET | Refills: 0 | Status: SHIPPED | OUTPATIENT
Start: 2019-05-16 | End: 2019-08-27

## 2019-08-05 RX ORDER — CLOPIDOGREL BISULFATE 75 MG/1
TABLET ORAL
Qty: 90 TABLET | Refills: 0 | OUTPATIENT
Start: 2019-08-05

## 2019-08-23 ENCOUNTER — OFFICE VISIT (OUTPATIENT)
Dept: FAMILY MEDICINE CLINIC | Facility: CLINIC | Age: 74
End: 2019-08-23

## 2019-08-23 VITALS
DIASTOLIC BLOOD PRESSURE: 74 MMHG | RESPIRATION RATE: 18 BRPM | HEART RATE: 69 BPM | HEIGHT: 67 IN | WEIGHT: 150 LBS | TEMPERATURE: 98 F | BODY MASS INDEX: 23.54 KG/M2 | SYSTOLIC BLOOD PRESSURE: 152 MMHG | OXYGEN SATURATION: 99 %

## 2019-08-23 DIAGNOSIS — E11.9 TYPE 2 DIABETES MELLITUS WITHOUT COMPLICATION, WITHOUT LONG-TERM CURRENT USE OF INSULIN (HCC): ICD-10-CM

## 2019-08-23 DIAGNOSIS — K21.9 GASTROESOPHAGEAL REFLUX DISEASE WITHOUT ESOPHAGITIS: ICD-10-CM

## 2019-08-23 DIAGNOSIS — J30.9 ALLERGIC RHINITIS, UNSPECIFIED SEASONALITY, UNSPECIFIED TRIGGER: ICD-10-CM

## 2019-08-23 DIAGNOSIS — I25.10 CORONARY ARTERY DISEASE INVOLVING NATIVE CORONARY ARTERY OF NATIVE HEART WITHOUT ANGINA PECTORIS: ICD-10-CM

## 2019-08-23 DIAGNOSIS — I10 ESSENTIAL HYPERTENSION: ICD-10-CM

## 2019-08-23 DIAGNOSIS — F17.210 CIGARETTE NICOTINE DEPENDENCE WITHOUT COMPLICATION: ICD-10-CM

## 2019-08-23 DIAGNOSIS — Z71.6 ENCOUNTER FOR TOBACCO USE CESSATION COUNSELING: ICD-10-CM

## 2019-08-23 DIAGNOSIS — Z00.00 MEDICARE ANNUAL WELLNESS VISIT, INITIAL: Primary | ICD-10-CM

## 2019-08-23 DIAGNOSIS — E78.2 MIXED HYPERLIPIDEMIA: ICD-10-CM

## 2019-08-23 PROCEDURE — 96160 PT-FOCUSED HLTH RISK ASSMT: CPT | Performed by: NURSE PRACTITIONER

## 2019-08-23 PROCEDURE — 99406 BEHAV CHNG SMOKING 3-10 MIN: CPT | Performed by: NURSE PRACTITIONER

## 2019-08-23 PROCEDURE — G0439 PPPS, SUBSEQ VISIT: HCPCS | Performed by: NURSE PRACTITIONER

## 2019-08-23 PROCEDURE — 99214 OFFICE O/P EST MOD 30 MIN: CPT | Performed by: NURSE PRACTITIONER

## 2019-08-23 RX ORDER — ASPIRIN 325 MG
325 TABLET ORAL DAILY
COMMUNITY
End: 2019-09-25 | Stop reason: SDUPTHER

## 2019-08-23 NOTE — PROGRESS NOTES
Subjective   Niraj Hassan is a 74 y.o. male.     Chief Complaint   Patient presents with   • Annual Exam   • Diabetes   • Medicare Wellness-subsequent   • Hypertension   • Hyperlipidemia      HPI patient is new to me.  He is here for annual wellness visit, follow-up for hypertension, hyperlipidemia, type 2 diabetes.    He considers himself overall healthy.  He has had past medical history of 2 heart attacks and is followed by cardiology.  He sees Dr. Swenson this December.  At last visit cardiology referred him to vascular for left leg claudication.  He has history of right femoropopliteal bypass.  Vascular recommended pletal.  His pharmacist expressed concerns about taking it and patient called his previous PCP who said absolutely do not take it.  So patient has not started taking it.  He has not been back to vascular since.  Has started wearing a copper brace on his left ankle and his claudication has resolved and he notices he is now growing hair on his lower left leg.    Hypertension: He is taking and tolerating his blood pressure medicines as directed.  He does not check his blood pressure at home but does report when he has accidentally missed doses in the past he can tell that it is going up because he gets mild headache.  He has not had a headache recently so he feels his blood pressure has not been elevated although it is a little bit here in the office today.  He reports he gets anxious with office visits.    Hyperlipidemia: He reports his triglycerides are always elevated and he does like carbohydrates.  He is taking and tolerating his statin as directed.  He does try to avoid high fat foods.    Type 2 diabetes: A1c has been well controlled with diet and metformin which he takes and tolerates well.  He had a diabetic eye exam last month and reports it was negative for diabetic retinopathy.  He tries to follow healthy diet although he does crave carbohydrates and does eat more than that he knows he  should.  He does not eat much sugar and reports he eats only an occasional candy bar.    He has chronic allergic rhinitis.  This is pretty well controlled with his current medications.  He has an allergy to mold and his medications seem to keep it from getting too bothersome.    GERD: He has chronic acid reflux which is well controlled with Prevacid daily.    He does smoke < 1 PPD.  He is not interested in discussing tobacco cessation, although he does admit he thinks about it all the time.  He declines a PFT to screen for COPD.     He is recently retired and is active around the house.  He thinks he may work part-time in the future if he gets bored.    He denies any personal or family history of colon or prostate cancer.      Social History     Tobacco Use   • Smoking status: Current Some Day Smoker   • Smokeless tobacco: Never Used   • Tobacco comment: caffeine use   Substance Use Topics   • Alcohol use: Yes     Comment: / socially          • Drug use: No       The following portions of the patient's history were reviewed and updated as appropriate: allergies, current medications, past family history, past medical history, past social history, past surgical history and problem list.    Review of Systems   Constitutional: Negative for activity change, appetite change, fatigue and unexpected weight change.   HENT: Positive for congestion, rhinorrhea and sneezing (Intermittent sneezing jags). Negative for ear discharge, ear pain (Recent right otitis externa resolved), postnasal drip, sore throat and trouble swallowing.    Eyes: Negative for pain and visual disturbance.   Respiratory: Negative for cough, chest tightness, shortness of breath and wheezing.    Cardiovascular: Negative for chest pain, palpitations and leg swelling.   Gastrointestinal: Negative for abdominal pain, blood in stool, constipation, diarrhea, nausea and vomiting.   Endocrine: Negative for polydipsia and polyuria.   Genitourinary: Negative for  "difficulty urinating, dysuria, hematuria and urgency.   Musculoskeletal: Negative for back pain, gait problem and joint swelling.   Allergic/Immunologic: Positive for environmental allergies.   Neurological: Negative for dizziness, seizures, weakness, numbness and headaches.   Hematological: Does not bruise/bleed easily.   Psychiatric/Behavioral: Negative for dysphoric mood and sleep disturbance (Controlled with melatonin). The patient is not nervous/anxious.        Objective   Blood pressure 152/74, pulse 69, temperature 98 °F (36.7 °C), resp. rate 18, height 168.9 cm (66.5\"), weight 68 kg (150 lb), SpO2 99 %.    Physical Exam   Constitutional: He is oriented to person, place, and time. He appears well-developed and well-nourished. No distress.   HENT:   Head: Normocephalic and atraumatic.   Right Ear: Tympanic membrane, external ear and ear canal normal.   Left Ear: Tympanic membrane, external ear and ear canal normal.   Mouth/Throat: Uvula is midline and oropharynx is clear and moist.   Eyes: Conjunctivae and EOM are normal. Pupils are equal, round, and reactive to light. Right eye exhibits no discharge. Left eye exhibits no discharge.   Neck: Neck supple. No thyromegaly present.   Cardiovascular: Normal rate and regular rhythm.   No murmur heard.  Pulmonary/Chest: Effort normal and breath sounds normal. He has no wheezes.   Abdominal: Soft. Bowel sounds are normal. There is no hepatosplenomegaly. There is no tenderness.   Musculoskeletal: He exhibits no deformity.   Gait smooth and steady   Lymphadenopathy:     He has cervical adenopathy (Right anterior cervical LAD).   Neurological: He is alert and oriented to person, place, and time. No cranial nerve deficit.   Skin: Skin is warm and dry.   Psychiatric: He has a normal mood and affect. His behavior is normal. Thought content normal.   Nursing note and vitals reviewed.      Assessment   Problem List Items Addressed This Visit        Cardiovascular and " Mediastinum    Essential hypertension    Relevant Orders    Comprehensive metabolic panel    CBC and Differential    Microalbumin / Creatinine Urine Ratio - Urine, Clean Catch    Mixed hyperlipidemia    Relevant Orders    Lipid Panel With LDL/HDL Ratio    CAD (coronary artery disease)       Digestive    GERD (gastroesophageal reflux disease)       Endocrine    Type 2 diabetes mellitus without complication, without long-term current use of insulin (CMS/[facility])    Relevant Orders    Comprehensive metabolic panel    CBC and Differential    Hemoglobin A1c    Microalbumin / Creatinine Urine Ratio - Urine, Clean Catch       Other    Medicare annual wellness visit, initial - Primary      Other Visit Diagnoses     Cigarette nicotine dependence without complication        Encounter for tobacco use cessation counseling        Allergic rhinitis, unspecified seasonality, unspecified trigger               Procedures PHQ-9 Total Score: 0      KELVIN-7: 0             Impression and Plan: Overall he seems to be managing his chronic illnesses as well.  He reads quite a bit about health and wellness, takes supplements that he researches.  Tries to follow an overall healthy diet and seems to be very active.    Coronary artery disease: He has had no chest pain.  He is taking his antiplatelets and statin, ACE, beta-blocker as directed.  He is compliant with his yearly follow-ups with cardiology.    Hypertension: His blood pressure is elevated a little today.  I have asked him to check it at home and let me know.  I would like to see it less than 130/80 due to his cardiovascular risk.    Hyperlipidemia: We reviewed his labs from 6 months ago and his triglycerides are elevated.  We will recheck these today.  We discussed low triglyceride diet.    Type 2 diabetes: He appears to be doing well on his metformin and is mindful of diet.  His A1c's have been below 8 and just above 7.  I think this is adequate.  He does not report any hypoglycemia.  We  discussed diet and exercise.  We will recheck an A1c today.  Not see that he has had a microalbumin so I will order this today.  He has had a diabetic eye exam.    GERD: Appears to be well controlled with current medication.  We discussed dietary modification as well as avoiding eating too close to bedtime and raising head of bed.    Allergic rhinitis: Appears to be well controlled on current medication.  I did notice that he has had several urgent care visits for URI type symptoms.  We discussed keeping tight control of that with upcoming high mold season.    We will get a CBC due to antiplatelet therapy to monitor for any anemia.  We will check a CMP due to medications and hypertension, diabetes.  We will check a lipid today as well as an A1c.  I will order microalbumin.    He declines any vaccines today as he does not believe in them.      He tells me he is up-to-date on colonoscopy and that he has had an EGD but I do not see that in his chart.  In reviewing previous notes from PCP it does note that he is up-to-date on colonoscopies but I do not see any reports.  We will try to locate these.      There are no preventive care reminders to display for this patient.           EMR Dragon/Transcription disclaimer:   Much of this encounter note is an electronic transcription/translation of spoken language to printed text. The electronic translation of spoken language may permit erroneous, or at times, nonsensical words or phrases to be inadvertently transcribed; Although I have reviewed the note for such errors, some may still exist.

## 2019-08-23 NOTE — PROGRESS NOTES
QUICK REFERENCE INFORMATION:  The ABCs of the Annual Wellness Visit    Subsequent Medicare Wellness Visit     HEALTH RISK ASSESSMENT    : 1945    Recent Hospitalizations:  No hospitalization(s) within the last year..  ccc      Current Medical Providers:  Patient Care Team:  Joanna Salvador APRN as PCP - General (Nurse Practitioner)        Smoking Status:  Social History     Tobacco Use   Smoking Status Current Some Day Smoker   Smokeless Tobacco Never Used   Tobacco Comment    caffeine use       Alcohol Consumption:  Social History     Substance and Sexual Activity   Alcohol Use Yes    Comment: / socially              Depression Screen:   PHQ-2/PHQ-9 Depression Screening 2019   Little interest or pleasure in doing things 0   Feeling down, depressed, or hopeless 0   Trouble falling or staying asleep, or sleeping too much 0   Feeling tired or having little energy 0   Poor appetite or overeating 0   Feeling bad about yourself - or that you are a failure or have let yourself or your family down 0   Trouble concentrating on things, such as reading the newspaper or watching television 0   Moving or speaking so slowly that other people could have noticed. Or the opposite - being so fidgety or restless that you have been moving around a lot more than usual 0   Thoughts that you would be better off dead, or of hurting yourself in some way 0   Total Score 0   If you checked off any problems, how difficult have these problems made it for you to do your work, take care of things at home, or get along with other people? Not difficult at all       Health Habits and Functional and Cognitive Screening:  Functional & Cognitive Status 2019   Do you have difficulty preparing food and eating? No   Do you have difficulty bathing yourself, getting dressed or grooming yourself? No   Do you have difficulty using the toilet? No   Do you have difficulty moving around from place to place? No   Do you have trouble with  steps or getting out of a bed or a chair? No   Current Diet Well Balanced Diet   Dental Exam Up to date   Eye Exam Up to date   Exercise (times per week) 0 times per week   Current Exercise Activities Include None   Do you need help using the phone?  No   Are you deaf or do you have serious difficulty hearing?  No   Do you need help with transportation? No   Do you need help shopping? No   Do you need help preparing meals?  No   Do you need help with housework?  No   Do you need help with laundry? No   Do you need help taking your medications? No   Do you need help managing money? No   Do you ever drive or ride in a car without wearing a seat belt? No   Have you felt unusual stress, anger or loneliness in the last month? No   Who do you live with? Alone   If you need help, do you have trouble finding someone available to you? No   Have you been bothered in the last four weeks by sexual problems? No   Do you have difficulty concentrating, remembering or making decisions? No           Does the patient have evidence of cognitive impairment? No    Asiprin use counseling: Taking ASA appropriately as indicated      Recent Lab Results:    Lab Results   Component Value Date     (H) 02/22/2019     Lab Results   Component Value Date    HGBA1C 7.80 (H) 02/22/2019     Lab Results   Component Value Date    TRIG 242 (H) 02/22/2019    HDL 32 (L) 02/22/2019    VLDL 48.4 (H) 02/22/2019    LDLHDL 2.30 02/22/2019           Age-appropriate Screening Schedule:  Refer to the list below for future screening recommendations based on patient's age, sex and/or medical conditions. Orders for these recommended tests are listed in the plan section. The patient has been provided with a written plan.    Health Maintenance   Topic Date Due   • INFLUENZA VACCINE  09/17/2019 (Originally 8/1/2019)   • LIPID PANEL  02/22/2020   • HEMOGLOBIN A1C  02/23/2020   • DIABETIC EYE EXAM  07/27/2020   • DIABETIC FOOT EXAM  08/23/2020   • URINE  MICROALBUMIN  08/23/2020   • COLONOSCOPY  07/28/2027   • PNEUMOCOCCAL VACCINES (65+ LOW/MEDIUM RISK)  Discontinued   • TDAP/TD VACCINES  Discontinued   • ZOSTER VACCINE  Discontinued        Subjective   History of Present Illness    Niraj Hassan is a 74 y.o. male who presents for an Annual Wellness Visit.    The following portions of the patient's history were reviewed and updated as appropriate: allergies, current medications, past family history, past medical history, past social history, past surgical history and problem list.    Outpatient Medications Prior to Visit   Medication Sig Dispense Refill   • aspirin 325 MG tablet Take 325 mg by mouth Daily.     • cetirizine (ZyrTEC) 10 MG tablet Take  by mouth daily.     • clopidogrel (PLAVIX) 75 MG tablet TAKE ONE TABLET BY MOUTH DAILY 90 tablet 0   • Digestive Enzymes (DIGESTIVE ENZYME PO) Take  by mouth.     • lansoprazole (PREVACID) 15 MG capsule Take 1 capsule by mouth Daily. 90 capsule 1   • lisinopril-hydrochlorothiazide (ZESTORETIC) 20-12.5 MG per tablet Take 1 tablet by mouth Daily. 30 tablet 5   • Melatonin 10 MG capsule Take  by mouth.     • metFORMIN (GLUCOPHAGE) 500 MG tablet Take 1 tablet by mouth 2 (Two) Times a Day With Meals. 180 tablet 1   • metoprolol succinate XL (TOPROL-XL) 50 MG 24 hr tablet TAKE ONE TABLET BY MOUTH DAILY 30 tablet 3   • Misc Natural Products (TART CHERRY ADVANCED) capsule Take  by mouth daily.     • Multiple Vitamin (MULTIVITAMIN) capsule Take  by mouth daily.     • Omega-3 Fatty Acids (FISH OIL) 1200 MG capsule capsule Take  by mouth.     • simvastatin (ZOCOR) 40 MG tablet TAKE ONE TABLET BY MOUTH EVERY NIGHT. 90 tablet 0   • Triamcinolone Acetonide (NASACORT) 55 MCG/ACT nasal inhaler 2 sprays into each nostril daily.     • azelastine (ASTELIN) 0.1 % nasal spray 2 sprays into the nostril(s) as directed by provider 2 (Two) Times a Day. Use in each nostril as directed 30 mL 0   • cephalexin (KEFLEX) 500 MG capsule Take 1  "capsule by mouth 3 (Three) Times a Day. 21 capsule 0   • cilostazol (PLETAL) 100 MG tablet Take 1 tablet by mouth 2 (Two) Times a Day. 60 tablet 1     No facility-administered medications prior to visit.        Patient Active Problem List   Diagnosis   • Essential hypertension   • Mixed hyperlipidemia   • Type 2 diabetes mellitus without complication, without long-term current use of insulin (CMS/[facility])   • Slow urinary stream   • Sciatica of right side   • Medicare annual wellness visit, initial   • Abnormal ankle brachial index (CAT)   • Claudication (CMS/[facility])   • CAD (coronary artery disease)       Advance Care Planning:  Patient does not have an advance directive - information provided to the patient today    Identification of Risk Factors:  Risk factors include: Tobacco Use/Dependance (use dotphrase .tobaccocessation for documentation)  Ready to quit: No  Counseling given: Yes  Comment: caffeine use    Smoking status: I advised patient of the risks of continuing to use tobacco, and I provided her with tobacco cessation educational materials.      During this visit, I spent 3 minutes counseling the patient regarding tobacco cessation.  We will reassess at next visit.         Review of Systems    Compared to one year ago, the patient feels his physical health is the same.  Compared to one year ago, the patient feels his mental health is the same.    Objective     Physical Exam    Vitals:    08/23/19 1359   BP: 152/74   Pulse: 69   Resp: 18   Temp: 98 °F (36.7 °C)   SpO2: 99%   Weight: 68 kg (150 lb)   Height: 168.9 cm (66.5\")       Patient's Body mass index is 23.85 kg/m². BMI is within normal parameters. No follow-up required..      Assessment/Plan   Patient Self-Management and Personalized Health Advice  The patient has been provided with information about: tobacco cessation and preventive services including:   · Annual Wellness Visit (AWV).    Visit Diagnoses:    ICD-10-CM ICD-9-CM   1. Medicare annual wellness " visit, initial Z00.00 V70.0   2. Type 2 diabetes mellitus without complication, without long-term current use of insulin (CMS/[facility]) E11.9 250.00   3. Essential hypertension I10 401.9   4. Mixed hyperlipidemia E78.2 272.2   5. Coronary artery disease involving native coronary artery of native heart without angina pectoris I25.10 414.01   6. Cigarette nicotine dependence without complication F17.210 305.1   7. Encounter for tobacco use cessation counseling Z71.6 V65.42       Orders Placed This Encounter   Procedures   • Comprehensive metabolic panel   • Lipid Panel With LDL/HDL Ratio   • Hemoglobin A1c   • Microalbumin / Creatinine Urine Ratio - Urine, Clean Catch   • CBC and Differential     Order Specific Question:   Manual Differential     Answer:   No       Outpatient Encounter Medications as of 8/23/2019   Medication Sig Dispense Refill   • aspirin 325 MG tablet Take 325 mg by mouth Daily.     • cetirizine (ZyrTEC) 10 MG tablet Take  by mouth daily.     • clopidogrel (PLAVIX) 75 MG tablet TAKE ONE TABLET BY MOUTH DAILY 90 tablet 0   • Digestive Enzymes (DIGESTIVE ENZYME PO) Take  by mouth.     • lansoprazole (PREVACID) 15 MG capsule Take 1 capsule by mouth Daily. 90 capsule 1   • lisinopril-hydrochlorothiazide (ZESTORETIC) 20-12.5 MG per tablet Take 1 tablet by mouth Daily. 30 tablet 5   • Melatonin 10 MG capsule Take  by mouth.     • metFORMIN (GLUCOPHAGE) 500 MG tablet Take 1 tablet by mouth 2 (Two) Times a Day With Meals. 180 tablet 1   • metoprolol succinate XL (TOPROL-XL) 50 MG 24 hr tablet TAKE ONE TABLET BY MOUTH DAILY 30 tablet 3   • Misc Natural Products (TART CHERRY ADVANCED) capsule Take  by mouth daily.     • Multiple Vitamin (MULTIVITAMIN) capsule Take  by mouth daily.     • Omega-3 Fatty Acids (FISH OIL) 1200 MG capsule capsule Take  by mouth.     • simvastatin (ZOCOR) 40 MG tablet TAKE ONE TABLET BY MOUTH EVERY NIGHT. 90 tablet 0   • Triamcinolone Acetonide (NASACORT) 55 MCG/ACT nasal inhaler 2  sprays into each nostril daily.     • [DISCONTINUED] azelastine (ASTELIN) 0.1 % nasal spray 2 sprays into the nostril(s) as directed by provider 2 (Two) Times a Day. Use in each nostril as directed 30 mL 0   • [DISCONTINUED] cephalexin (KEFLEX) 500 MG capsule Take 1 capsule by mouth 3 (Three) Times a Day. 21 capsule 0   • [DISCONTINUED] cilostazol (PLETAL) 100 MG tablet Take 1 tablet by mouth 2 (Two) Times a Day. 60 tablet 1     No facility-administered encounter medications on file as of 8/23/2019.        Reviewed use of high risk medication in the elderly: yes  Reviewed for potential of harmful drug interactions in the elderly: yes    Follow Up:  Return in about 6 months (around 2/23/2020).     An After Visit Summary and PPPS with all of these plans were given to the patient.                    EMR Dragon/Transcription disclaimer:   Much of this encounter note is an electronic transcription/translation of spoken language to printed text. The electronic translation of spoken language may permit erroneous, or at times, nonsensical words or phrases to be inadvertently transcribed; Although I have reviewed the note for such errors, some may still exist.      Advance Directive    Advance directives are legal documents that let you make choices ahead of time about your health care and medical treatment in case you become unable to communicate for yourself. Advance directives are a way for you to communicate your wishes to family, friends, and health care providers. This can help convey your decisions about end-of-life care if you become unable to communicate.  Discussing and writing advance directives should happen over time rather than all at once. Advance directives can be changed depending on your situation and what you want, even after you have signed the advance directives.  If you do not have an advance directive, some states assign family decision makers to act on your behalf based on how closely you are related  to them. Each state has its own laws regarding advance directives. You may want to check with your health care provider, , or state representative about the laws in your state. There are different types of advance directives, such as:  · Medical power of .  · Living will.  · Do not resuscitate (DNR) or do not attempt resuscitation (DNAR) order.  Health care proxy and medical power of   A health care proxy, also called a health care agent, is a person who is appointed to make medical decisions for you in cases in which you are unable to make the decisions yourself. Generally, people choose someone they know well and trust to represent their preferences. Make sure to ask this person for an agreement to act as your proxy. A proxy may have to exercise judgment in the event of a medical decision for which your wishes are not known.  A medical power of  is a legal document that names your health care proxy. Depending on the laws in your state, after the document is written, it may also need to be:  · Signed.  · Notarized.  · Dated.  · Copied.  · Witnessed.  · Incorporated into your medical record.  You may also want to appoint someone to manage your financial affairs in a situation in which you are unable to do so. This is called a durable power of  for finances. It is a separate legal document from the durable power of  for health care. You may choose the same person or someone different from your health care proxy to act as your agent in financial matters.  If you do not appoint a proxy, or if there is a concern that the proxy is not acting in your best interests, a court-appointed guardian may be designated to act on your behalf.  Living will  A living will is a set of instructions documenting your wishes about medical care when you cannot express them yourself. Health care providers should keep a copy of your living will in your medical record. You may want to give a  copy to family members or friends. To alert caregivers in case of an emergency, you can place a card in your wallet to let them know that you have a living will and where they can find it. A living will is used if you become:  · Terminally ill.  · Incapacitated.  · Unable to communicate or make decisions.  Items to consider in your living will include:  · The use or non-use of life-sustaining equipment, such as dialysis machines and breathing machines (ventilators).  · A DNR or DNAR order, which is the instruction not to use cardiopulmonary resuscitation (CPR) if breathing or heartbeat stops.  · The use or non-use of tube feeding.  · Withholding of food and fluids.  · Comfort (palliative) care when the goal becomes comfort rather than a cure.  · Organ and tissue donation.  A living will does not give instructions for distributing your money and property if you should pass away. It is recommended that you seek the advice of a  when writing a will. Decisions about taxes, beneficiaries, and asset distribution will be legally binding. This process can relieve your family and friends of any concerns surrounding disputes or questions that may come up about the distribution of your assets.  DNR or DNAR  A DNR or DNAR order is a request not to have CPR in the event that your heart stops beating or you stop breathing. If a DNR or DNAR order has not been made and shared, a health care provider will try to help any patient whose heart has stopped or who has stopped breathing. If you plan to have surgery, talk with your health care provider about how your DNR or DNAR order will be followed if problems occur.  Summary  · Advance directives are the legal documents that allow you to make choices ahead of time about your health care and medical treatment in case you become unable to communicate for yourself.  · The process of discussing and writing advance directives should happen over time. You can change the advance  directives, even after you have signed them.  · Advance directives include DNR or DNAR orders, living hobson, and designating an agent as your medical power of .  This information is not intended to replace advice given to you by your health care provider. Make sure you discuss any questions you have with your health care provider.  Document Released: 03/26/2009 Document Revised: 11/06/2017 Document Reviewed: 11/06/2017  [employer] Interactive Patient Education © 2019 [employer]

## 2019-08-26 LAB
ALBUMIN SERPL-MCNC: 4.5 G/DL (ref 3.5–5.2)
ALBUMIN/CREAT UR: 8.9 MG/G CREAT (ref 0–30)
ALBUMIN/GLOB SERPL: 1.6 G/DL
ALP SERPL-CCNC: 98 U/L (ref 39–117)
ALT SERPL-CCNC: 13 U/L (ref 1–41)
AST SERPL-CCNC: 13 U/L (ref 1–40)
BASOPHILS # BLD AUTO: 0.05 10*3/MM3 (ref 0–0.2)
BASOPHILS NFR BLD AUTO: 0.5 % (ref 0–1.5)
BILIRUB SERPL-MCNC: 0.2 MG/DL (ref 0.2–1.2)
BUN SERPL-MCNC: 16 MG/DL (ref 8–23)
BUN/CREAT SERPL: 19.3 (ref 7–25)
CALCIUM SERPL-MCNC: 9.5 MG/DL (ref 8.6–10.5)
CHLORIDE SERPL-SCNC: 102 MMOL/L (ref 98–107)
CHOLEST SERPL-MCNC: 148 MG/DL (ref 0–200)
CO2 SERPL-SCNC: 26.8 MMOL/L (ref 22–29)
CREAT SERPL-MCNC: 0.83 MG/DL (ref 0.76–1.27)
CREAT UR-MCNC: 132.3 MG/DL
EOSINOPHIL # BLD AUTO: 0.63 10*3/MM3 (ref 0–0.4)
EOSINOPHIL NFR BLD AUTO: 6.7 % (ref 0.3–6.2)
ERYTHROCYTE [DISTWIDTH] IN BLOOD BY AUTOMATED COUNT: 12.7 % (ref 12.3–15.4)
GLOBULIN SER CALC-MCNC: 2.8 GM/DL
GLUCOSE SERPL-MCNC: 228 MG/DL (ref 65–99)
HBA1C MFR BLD: 7.3 % (ref 4.8–5.6)
HCT VFR BLD AUTO: 43.7 % (ref 37.5–51)
HDLC SERPL-MCNC: 28 MG/DL (ref 40–60)
HGB BLD-MCNC: 14.2 G/DL (ref 13–17.7)
IMM GRANULOCYTES # BLD AUTO: 0.02 10*3/MM3 (ref 0–0.05)
IMM GRANULOCYTES NFR BLD AUTO: 0.2 % (ref 0–0.5)
LDLC SERPL CALC-MCNC: ABNORMAL MG/DL
LDLC/HDLC SERPL: ABNORMAL {RATIO}
LYMPHOCYTES # BLD AUTO: 2.78 10*3/MM3 (ref 0.7–3.1)
LYMPHOCYTES NFR BLD AUTO: 29.6 % (ref 19.6–45.3)
MCH RBC QN AUTO: 31.9 PG (ref 26.6–33)
MCHC RBC AUTO-ENTMCNC: 32.5 G/DL (ref 31.5–35.7)
MCV RBC AUTO: 98.2 FL (ref 79–97)
MICROALBUMIN UR-MCNC: 11.8 UG/ML
MONOCYTES # BLD AUTO: 0.66 10*3/MM3 (ref 0.1–0.9)
MONOCYTES NFR BLD AUTO: 7 % (ref 5–12)
NEUTROPHILS # BLD AUTO: 5.25 10*3/MM3 (ref 1.7–7)
NEUTROPHILS NFR BLD AUTO: 56 % (ref 42.7–76)
NRBC BLD AUTO-RTO: 0 /100 WBC (ref 0–0.2)
PLATELET # BLD AUTO: 204 10*3/MM3 (ref 140–450)
POTASSIUM SERPL-SCNC: 4.2 MMOL/L (ref 3.5–5.2)
PROT SERPL-MCNC: 7.3 G/DL (ref 6–8.5)
RBC # BLD AUTO: 4.45 10*6/MM3 (ref 4.14–5.8)
SODIUM SERPL-SCNC: 143 MMOL/L (ref 136–145)
TRIGL SERPL-MCNC: 475 MG/DL (ref 0–150)
VLDLC SERPL CALC-MCNC: ABNORMAL MG/DL
WBC # BLD AUTO: 9.39 10*3/MM3 (ref 3.4–10.8)

## 2019-08-27 DIAGNOSIS — E78.1 HIGH TRIGLYCERIDES: Primary | ICD-10-CM

## 2019-08-27 DIAGNOSIS — E78.5 HYPERLIPIDEMIA ASSOCIATED WITH TYPE 2 DIABETES MELLITUS (HCC): ICD-10-CM

## 2019-08-27 DIAGNOSIS — E11.69 HYPERLIPIDEMIA ASSOCIATED WITH TYPE 2 DIABETES MELLITUS (HCC): ICD-10-CM

## 2019-08-27 RX ORDER — ATORVASTATIN CALCIUM 40 MG/1
40 TABLET, FILM COATED ORAL DAILY
Qty: 90 TABLET | Refills: 1 | Status: SHIPPED | OUTPATIENT
Start: 2019-08-27 | End: 2020-02-20

## 2019-08-27 NOTE — PROGRESS NOTES
Spoke in detail with Patient about results, patient expressed understanding and will follow up as agreed.     Any pending Labs and/or Diagnostic procedures required have been ordered for future release.    Jumana WELCH

## 2019-09-06 RX ORDER — SIMVASTATIN 40 MG
TABLET ORAL
Qty: 90 TABLET | Refills: 0 | OUTPATIENT
Start: 2019-09-06

## 2019-09-11 RX ORDER — LISINOPRIL AND HYDROCHLOROTHIAZIDE 20; 12.5 MG/1; MG/1
TABLET ORAL
Qty: 90 TABLET | Refills: 1 | Status: SHIPPED | OUTPATIENT
Start: 2019-09-11 | End: 2019-09-15 | Stop reason: SDUPTHER

## 2019-09-16 RX ORDER — LISINOPRIL AND HYDROCHLOROTHIAZIDE 20; 12.5 MG/1; MG/1
TABLET ORAL
Qty: 30 TABLET | Refills: 0 | Status: SHIPPED | OUTPATIENT
Start: 2019-09-16 | End: 2020-02-26 | Stop reason: SDUPTHER

## 2019-09-25 RX ORDER — CLOPIDOGREL BISULFATE 75 MG/1
TABLET ORAL
Qty: 90 TABLET | Refills: 0 | Status: SHIPPED | OUTPATIENT
Start: 2019-09-25 | End: 2019-12-23

## 2019-11-25 RX ORDER — METOPROLOL SUCCINATE 50 MG/1
TABLET, EXTENDED RELEASE ORAL
Qty: 86 TABLET | Refills: 0 | Status: SHIPPED | OUTPATIENT
Start: 2019-11-25 | End: 2020-02-17

## 2019-12-23 RX ORDER — CLOPIDOGREL BISULFATE 75 MG/1
TABLET ORAL
Qty: 90 TABLET | Refills: 0 | Status: SHIPPED | OUTPATIENT
Start: 2019-12-23 | End: 2020-03-19

## 2020-02-17 RX ORDER — METOPROLOL SUCCINATE 50 MG/1
TABLET, EXTENDED RELEASE ORAL
Qty: 86 TABLET | Refills: 0 | Status: SHIPPED | OUTPATIENT
Start: 2020-02-17 | End: 2020-05-12

## 2020-02-20 DIAGNOSIS — E11.69 HYPERLIPIDEMIA ASSOCIATED WITH TYPE 2 DIABETES MELLITUS (HCC): ICD-10-CM

## 2020-02-20 DIAGNOSIS — E78.1 HIGH TRIGLYCERIDES: ICD-10-CM

## 2020-02-20 DIAGNOSIS — E78.5 HYPERLIPIDEMIA ASSOCIATED WITH TYPE 2 DIABETES MELLITUS (HCC): ICD-10-CM

## 2020-02-20 RX ORDER — ATORVASTATIN CALCIUM 40 MG/1
TABLET, FILM COATED ORAL
Qty: 90 TABLET | Refills: 1 | Status: SHIPPED | OUTPATIENT
Start: 2020-02-20 | End: 2020-08-18

## 2020-02-23 ENCOUNTER — RESULTS ENCOUNTER (OUTPATIENT)
Dept: FAMILY MEDICINE CLINIC | Facility: CLINIC | Age: 75
End: 2020-02-23

## 2020-02-23 DIAGNOSIS — E78.1 HIGH TRIGLYCERIDES: ICD-10-CM

## 2020-02-23 DIAGNOSIS — E11.69 HYPERLIPIDEMIA ASSOCIATED WITH TYPE 2 DIABETES MELLITUS (HCC): ICD-10-CM

## 2020-02-23 DIAGNOSIS — E78.5 HYPERLIPIDEMIA ASSOCIATED WITH TYPE 2 DIABETES MELLITUS (HCC): ICD-10-CM

## 2020-02-26 RX ORDER — LISINOPRIL AND HYDROCHLOROTHIAZIDE 20; 12.5 MG/1; MG/1
1 TABLET ORAL DAILY
Qty: 90 TABLET | Refills: 0 | Status: SHIPPED | OUTPATIENT
Start: 2020-02-26 | End: 2020-06-19 | Stop reason: SDUPTHER

## 2020-02-28 ENCOUNTER — OFFICE VISIT (OUTPATIENT)
Dept: FAMILY MEDICINE CLINIC | Facility: CLINIC | Age: 75
End: 2020-02-28

## 2020-02-28 VITALS
BODY MASS INDEX: 23.61 KG/M2 | HEIGHT: 66 IN | HEART RATE: 70 BPM | RESPIRATION RATE: 18 BRPM | SYSTOLIC BLOOD PRESSURE: 118 MMHG | DIASTOLIC BLOOD PRESSURE: 64 MMHG | OXYGEN SATURATION: 99 % | WEIGHT: 146.9 LBS | TEMPERATURE: 98.4 F

## 2020-02-28 DIAGNOSIS — R13.13 PHARYNGEAL DYSPHAGIA: ICD-10-CM

## 2020-02-28 DIAGNOSIS — E78.1 HIGH TRIGLYCERIDES: Primary | ICD-10-CM

## 2020-02-28 DIAGNOSIS — E78.5 HYPERLIPIDEMIA ASSOCIATED WITH TYPE 2 DIABETES MELLITUS (HCC): ICD-10-CM

## 2020-02-28 DIAGNOSIS — R63.4 WEIGHT LOSS: ICD-10-CM

## 2020-02-28 DIAGNOSIS — I10 ESSENTIAL HYPERTENSION: ICD-10-CM

## 2020-02-28 DIAGNOSIS — I73.9 CLAUDICATION (HCC): ICD-10-CM

## 2020-02-28 DIAGNOSIS — E11.69 HYPERLIPIDEMIA ASSOCIATED WITH TYPE 2 DIABETES MELLITUS (HCC): ICD-10-CM

## 2020-02-28 DIAGNOSIS — E11.59 TYPE 2 DIABETES MELLITUS WITH OTHER CIRCULATORY COMPLICATION, WITHOUT LONG-TERM CURRENT USE OF INSULIN (HCC): ICD-10-CM

## 2020-02-28 PROBLEM — E11.9 DIABETES MELLITUS: Status: ACTIVE | Noted: 2020-02-28

## 2020-02-28 PROCEDURE — 99214 OFFICE O/P EST MOD 30 MIN: CPT | Performed by: NURSE PRACTITIONER

## 2020-02-29 LAB
ALBUMIN SERPL-MCNC: 4.7 G/DL (ref 3.5–5.2)
ALBUMIN/GLOB SERPL: 1.9 G/DL
ALP SERPL-CCNC: 106 U/L (ref 39–117)
ALT SERPL-CCNC: 15 U/L (ref 1–41)
AST SERPL-CCNC: 13 U/L (ref 1–40)
BILIRUB SERPL-MCNC: 0.5 MG/DL (ref 0.2–1.2)
BUN SERPL-MCNC: 14 MG/DL (ref 8–23)
BUN/CREAT SERPL: 14.9 (ref 7–25)
CALCIUM SERPL-MCNC: 9.8 MG/DL (ref 8.6–10.5)
CHLORIDE SERPL-SCNC: 99 MMOL/L (ref 98–107)
CHOLEST SERPL-MCNC: 126 MG/DL (ref 0–200)
CO2 SERPL-SCNC: 25.5 MMOL/L (ref 22–29)
CREAT SERPL-MCNC: 0.94 MG/DL (ref 0.76–1.27)
GLOBULIN SER CALC-MCNC: 2.5 GM/DL
GLUCOSE SERPL-MCNC: 155 MG/DL (ref 65–99)
HBA1C MFR BLD: 7.6 % (ref 4.8–5.6)
HDLC SERPL-MCNC: 32 MG/DL (ref 40–60)
LDLC SERPL CALC-MCNC: 58 MG/DL (ref 0–100)
LDLC/HDLC SERPL: 1.81 {RATIO}
POTASSIUM SERPL-SCNC: 4.6 MMOL/L (ref 3.5–5.2)
PROT SERPL-MCNC: 7.2 G/DL (ref 6–8.5)
SODIUM SERPL-SCNC: 140 MMOL/L (ref 136–145)
TRIGL SERPL-MCNC: 181 MG/DL (ref 0–150)
VLDLC SERPL CALC-MCNC: 36.2 MG/DL

## 2020-03-10 ENCOUNTER — PREP FOR SURGERY (OUTPATIENT)
Dept: OTHER | Facility: HOSPITAL | Age: 75
End: 2020-03-10

## 2020-03-10 ENCOUNTER — OFFICE VISIT (OUTPATIENT)
Dept: GASTROENTEROLOGY | Facility: CLINIC | Age: 75
End: 2020-03-10

## 2020-03-10 VITALS
OXYGEN SATURATION: 99 % | SYSTOLIC BLOOD PRESSURE: 122 MMHG | WEIGHT: 148 LBS | DIASTOLIC BLOOD PRESSURE: 84 MMHG | BODY MASS INDEX: 23.78 KG/M2 | TEMPERATURE: 98 F | HEART RATE: 78 BPM | HEIGHT: 66 IN

## 2020-03-10 DIAGNOSIS — R13.10 DYSPHAGIA, UNSPECIFIED TYPE: Primary | ICD-10-CM

## 2020-03-10 DIAGNOSIS — R63.4 WEIGHT LOSS: ICD-10-CM

## 2020-03-10 DIAGNOSIS — R13.10 DYSPHAGIA: Primary | ICD-10-CM

## 2020-03-10 DIAGNOSIS — K21.9 GASTROESOPHAGEAL REFLUX DISEASE, ESOPHAGITIS PRESENCE NOT SPECIFIED: ICD-10-CM

## 2020-03-10 PROCEDURE — 99204 OFFICE O/P NEW MOD 45 MIN: CPT | Performed by: INTERNAL MEDICINE

## 2020-03-10 NOTE — PATIENT INSTRUCTIONS
1. For further evaluation of dysphagia and weight loss, we will schedule an EGD.    2. Continue lansoprazole 15 mg once daily.    3. We strongly encourage smoking cessation.    4. We will obtain your most recent ColoGuard test results for our review.    5. Schedule office follow up 4 weeks after EGD to discuss results and reassess symptoms.

## 2020-03-10 NOTE — PROGRESS NOTES
"Difficulty Swallowing      HPI  74-year old male presents to the office today for consultation for dysphagia and weight loss. He reports pill dysphagia and states that pills will hang up in his esophagus. He reports having to take smaller bites when swallowing food. If he eats too quickly he will cough at times. He denies any regurgitation. Food does not seem to \"hang\" in his throat. He takes lansoprazole 15 mg daily due to his history of epigastric pain and abnormal findings of \"holes in my stomach\" on EGD. He is a current every day smoker, less than 1 ppd for many years. He has had myocardial infarctions in the past.     He reports a 10 lb weight loss over the past 6 months.     He reports his last colonoscopy was many years ago. He denies any history of colon polyps. He reports having a ColoGuard test a couple of years ago that was normal.     Documentation by Savanna SNIDER acting as a scribe in the following sections (HPI, Assessment, Plan) for the undersigned provider.       Review of Systems   Constitutional: Negative for appetite change, chills, diaphoresis, fatigue, fever and unexpected weight change.   HENT: Negative for dental problem, mouth sores, rhinorrhea, sore throat and voice change.    Eyes: Negative for pain, redness and visual disturbance.   Respiratory: Negative for cough, chest tightness and wheezing.    Cardiovascular: Negative for chest pain, palpitations and leg swelling.   Endocrine: Negative for cold intolerance, heat intolerance, polydipsia, polyphagia and polyuria.   Genitourinary: Negative for dysuria, frequency, hematuria and urgency.   Musculoskeletal: Negative for arthralgias, back pain, joint swelling, myalgias and neck pain.   Skin: Negative for rash.   Allergic/Immunologic: Positive for environmental allergies. Negative for food allergies and immunocompromised state.   Neurological: Negative for dizziness, seizures, weakness, numbness and headaches.   Hematological: Does not " bruise/bleed easily.   Psychiatric/Behavioral: Negative for sleep disturbance. The patient is not nervous/anxious.         I have reviewed and confirmed the accuracy of the ROS as documented by the MA/LPN/RN Gildardo Whelan MD     Problem List:    Patient Active Problem List   Diagnosis   • Essential hypertension   • Mixed hyperlipidemia   • Type 2 diabetes mellitus without complication, without long-term current use of insulin (CMS/[facility])   • Slow urinary stream   • Sciatica of right side   • Medicare annual wellness visit, initial   • Abnormal ankle brachial index (CAT)   • Claudication (CMS/[facility])   • CAD (coronary artery disease)   • GERD (gastroesophageal reflux disease)   • Diabetes mellitus (CMS/[facility])       Medical History:    Past Medical History:   Diagnosis Date   • CAD (coronary artery disease)    • GERD (gastroesophageal reflux disease)    • Myocardial infarction (CMS/[facility])    • PVD (peripheral vascular disease) (CMS/[facility])         Social History:    Social History     Socioeconomic History   • Marital status:      Spouse name: Not on file   • Number of children: Not on file   • Years of education: Not on file   • Highest education level: Not on file   Tobacco Use   • Smoking status: Current Some Day Smoker   • Smokeless tobacco: Never Used   • Tobacco comment: caffeine use   Substance and Sexual Activity   • Alcohol use: Yes     Comment: / socially          • Drug use: No   • Sexual activity: Defer       Family History:   Family History   Problem Relation Age of Onset   • Heart attack Mother    • Cancer Mother    • Heart disease Mother         PACEMAKER   • Heart attack Father        Surgical History:   Past Surgical History:   Procedure Laterality Date   • CARDIAC CATHETERIZATION      showed total occlusion of the RCA, mild left main disease, and moderate disease of the circumflex.   • CORONARY ANGIOPLASTY WITH STENT PLACEMENT      angioplasty and stent placement to the circumflex         Current  Outpatient Medications:   •  atorvastatin (LIPITOR) 40 MG tablet, TAKE ONE TABLET BY MOUTH DAILY, Disp: 90 tablet, Rfl: 1  •  cetirizine (ZyrTEC) 10 MG tablet, Take  by mouth daily., Disp: , Rfl:   •  clopidogrel (PLAVIX) 75 MG tablet, TAKE ONE TABLET BY MOUTH DAILY, Disp: 90 tablet, Rfl: 0  •  Digestive Enzymes (DIGESTIVE ENZYME PO), Take  by mouth., Disp: , Rfl:   •  lansoprazole (PREVACID) 15 MG capsule, Take 1 capsule by mouth Daily., Disp: 90 capsule, Rfl: 1  •  lisinopril-hydrochlorothiazide (PRINZIDE,ZESTORETIC) 20-12.5 MG per tablet, Take 1 tablet by mouth Daily., Disp: 90 tablet, Rfl: 0  •  Melatonin 10 MG capsule, Take  by mouth., Disp: , Rfl:   •  metFORMIN (GLUCOPHAGE) 500 MG tablet, TAKE ONE TABLET BY MOUTH TWICE A DAY WITH MEALS, Disp: 180 tablet, Rfl: 1  •  metoprolol succinate XL (TOPROL-XL) 50 MG 24 hr tablet, TAKE ONE TABLET BY MOUTH DAILY, Disp: 86 tablet, Rfl: 0  •  Misc Natural Products (TART CHERRY ADVANCED) capsule, Take  by mouth daily., Disp: , Rfl:   •  Multiple Vitamin (MULTIVITAMIN) capsule, Take  by mouth daily., Disp: , Rfl:   •  Omega-3 Fatty Acids (FISH OIL) 1200 MG capsule capsule, Take  by mouth., Disp: , Rfl:   •  Triamcinolone Acetonide (NASACORT) 55 MCG/ACT nasal inhaler, 2 sprays into each nostril daily., Disp: , Rfl:     Allergies: No Known Allergies     The following portions of the patient's history were reviewed and updated as appropriate: allergies, current medications, past family history, past medical history, past social history, past surgical history and problem list.    Vitals:    03/10/20 1356   BP: 122/84   Pulse: 78   Temp: 98 °F (36.7 °C)   SpO2: 99%         03/10/20  1356   Weight: 67.1 kg (148 lb)     Body mass index is 23.53 kg/m².      PHYSICAL EXAM:  Physical Exam   Constitutional: He appears well-developed.   HENT:   Nose: Nose normal. No nasal deformity.   Eyes: No scleral icterus.   Neck: No tracheal deviation present.   Pulmonary/Chest: Effort normal and  breath sounds normal. No respiratory distress.   Abdominal: Soft. Normal appearance and bowel sounds are normal. He exhibits no shifting dullness and no distension. There is no hepatosplenomegaly. There is no tenderness. There is no rigidity, no rebound and no guarding. No hernia.   Lymphadenopathy:   No periumbilical lymphadenopathy   Neurological: He is alert.   Skin: Skin is warm. No cyanosis.   Psychiatric: He has a normal mood and affect. His behavior is normal.   Vitals reviewed.          Assessment/ Plan  Niraj was seen today for difficulty swallowing.    Diagnoses and all orders for this visit:    Dysphagia, unspecified type    Weight loss    Gastroesophageal reflux disease, esophagitis presence not specified         Return for Return to the clinic with the nurse practitioner in 4 weeks.      Discussion:  Proceed with an EGD due to his worsening dysphagia and weight loss.  His colon cancer screening is up-to-date with a negative Cologuard within the last 2 years.    Note: Review and summarization of old patient records in this note have been personally reviewed by me  And   Refer to After Visit Summary for details of patient counseling, education and instructions provided during the encounter

## 2020-03-17 ENCOUNTER — TELEPHONE (OUTPATIENT)
Dept: CARDIOLOGY | Facility: CLINIC | Age: 75
End: 2020-03-17

## 2020-03-17 NOTE — TELEPHONE ENCOUNTER
We received a fax from Dr. Stanislav Whelan regarding Mr. Hassan.  He is going to have an EGD.  I placed the fax in your inbox for your review.  Thanks/jlf    Dr. Whelan# [phone]  Fax# [phone]

## 2020-03-19 RX ORDER — CLOPIDOGREL BISULFATE 75 MG/1
TABLET ORAL
Qty: 90 TABLET | Refills: 0 | Status: SHIPPED | OUTPATIENT
Start: 2020-03-19 | End: 2020-06-18

## 2020-05-12 RX ORDER — METOPROLOL SUCCINATE 50 MG/1
TABLET, EXTENDED RELEASE ORAL
Qty: 86 TABLET | Refills: 0 | Status: SHIPPED | OUTPATIENT
Start: 2020-05-12 | End: 2020-08-04

## 2020-06-03 ENCOUNTER — TELEPHONE (OUTPATIENT)
Dept: CARDIOLOGY | Facility: CLINIC | Age: 75
End: 2020-06-03

## 2020-06-03 NOTE — TELEPHONE ENCOUNTER
Jennifer from [facility] called regarding Mr. Hassan.  Please refer to telephone note from 3/10/20 if needed.  Pt is holding his Coumadin for 3-5 days prior to his EDG next week.  They would like to know if it is okay for him to hold his ASA as well?  Please advise/matilda Rodriguez# [phone]

## 2020-06-03 NOTE — TELEPHONE ENCOUNTER
He should not hold that warfarin any longer than 3 days and should get INR 5 days prior to procedure and than decide. He should not hold aspirin.PARTHA

## 2020-06-04 ENCOUNTER — TELEPHONE (OUTPATIENT)
Dept: GASTROENTEROLOGY | Facility: CLINIC | Age: 75
End: 2020-06-04

## 2020-06-04 ENCOUNTER — LAB REQUISITION (OUTPATIENT)
Dept: LAB | Facility: HOSPITAL | Age: 75
End: 2020-06-04

## 2020-06-04 DIAGNOSIS — Z00.00 ENCOUNTER FOR GENERAL ADULT MEDICAL EXAMINATION WITHOUT ABNORMAL FINDINGS: ICD-10-CM

## 2020-06-04 NOTE — TELEPHONE ENCOUNTER
I received call from [facility] where they provided instructions of his blood thinner. Everything addresses Coumadin but according to his medication list, he is on Plavix.  Can you please clarify and will this change the recommendations?

## 2020-06-04 NOTE — TELEPHONE ENCOUNTER
I called and s/w Aysha with [facility].  She understands verbally and will notify Mr. Hassan./matilda

## 2020-06-04 NOTE — TELEPHONE ENCOUNTER
I called and s/w Senait.  Pt is taking Plavix NOT Coumadin.  Pt advised to hold Plavix for 3 days but remain on ASA./matilda

## 2020-06-05 PROCEDURE — U0004 COV-19 TEST NON-CDC HGH THRU: HCPCS | Performed by: INTERNAL MEDICINE

## 2020-06-06 LAB
REF LAB TEST METHOD: NORMAL
SARS-COV-2 RNA RESP QL NAA+PROBE: NOT DETECTED

## 2020-06-08 ENCOUNTER — LAB REQUISITION (OUTPATIENT)
Dept: LAB | Facility: HOSPITAL | Age: 75
End: 2020-06-08

## 2020-06-08 ENCOUNTER — OUTSIDE FACILITY SERVICE (OUTPATIENT)
Dept: GASTROENTEROLOGY | Facility: CLINIC | Age: 75
End: 2020-06-08

## 2020-06-08 DIAGNOSIS — D49.0 NEOPLASM OF HEAD OF PANCREAS: Primary | ICD-10-CM

## 2020-06-08 DIAGNOSIS — R13.10 DYSPHAGIA, UNSPECIFIED: ICD-10-CM

## 2020-06-08 PROCEDURE — 88313 SPECIAL STAINS GROUP 2: CPT | Performed by: INTERNAL MEDICINE

## 2020-06-08 PROCEDURE — 43239 EGD BIOPSY SINGLE/MULTIPLE: CPT | Performed by: INTERNAL MEDICINE

## 2020-06-08 PROCEDURE — 88305 TISSUE EXAM BY PATHOLOGIST: CPT | Performed by: INTERNAL MEDICINE

## 2020-06-08 PROCEDURE — 43249 ESOPH EGD DILATION <30 MM: CPT | Performed by: INTERNAL MEDICINE

## 2020-06-10 LAB
CYTO UR: NORMAL
LAB AP CASE REPORT: NORMAL
LAB AP CLINICAL INFORMATION: NORMAL
LAB AP SPECIAL STAINS: NORMAL
PATH REPORT.FINAL DX SPEC: NORMAL
PATH REPORT.GROSS SPEC: NORMAL

## 2020-06-16 ENCOUNTER — HOSPITAL ENCOUNTER (OUTPATIENT)
Dept: MRI IMAGING | Facility: HOSPITAL | Age: 75
Discharge: HOME | End: 2020-06-16
Admitting: INTERNAL MEDICINE

## 2020-06-16 DIAGNOSIS — D49.0 NEOPLASM OF HEAD OF PANCREAS: ICD-10-CM

## 2020-06-16 PROCEDURE — A9577 INJ MULTIHANCE: HCPCS | Performed by: INTERNAL MEDICINE

## 2020-06-16 PROCEDURE — 74183 MRI ABD W/O CNTR FLWD CNTR: CPT

## 2020-06-16 PROCEDURE — 0 GADOBENATE DIMEGLUMINE 529 MG/ML SOLUTION: Performed by: INTERNAL MEDICINE

## 2020-06-16 PROCEDURE — 82565 ASSAY OF CREATININE: CPT

## 2020-06-16 RX ADMIN — GADOBENATE DIMEGLUMINE 15 ML: 529 INJECTION, SOLUTION INTRAVENOUS at 18:34

## 2020-06-17 ENCOUNTER — APPOINTMENT (OUTPATIENT)
Dept: MRI IMAGING | Facility: HOSPITAL | Age: 75
End: 2020-06-17

## 2020-06-17 LAB — CREAT BLDA-MCNC: 0.9 MG/DL (ref 0.6–1.3)

## 2020-06-18 RX ORDER — CLOPIDOGREL BISULFATE 75 MG/1
TABLET ORAL
Qty: 90 TABLET | Refills: 0 | Status: SHIPPED | OUTPATIENT
Start: 2020-06-18 | End: 2020-09-16

## 2020-06-22 RX ORDER — LISINOPRIL AND HYDROCHLOROTHIAZIDE 20; 12.5 MG/1; MG/1
1 TABLET ORAL DAILY
Qty: 90 TABLET | Refills: 0 | Status: SHIPPED | OUTPATIENT
Start: 2020-06-22 | End: 2020-09-28

## 2020-06-23 ENCOUNTER — OFFICE VISIT (OUTPATIENT)
Dept: GASTROENTEROLOGY | Facility: CLINIC | Age: 75
End: 2020-06-23

## 2020-06-23 VITALS
DIASTOLIC BLOOD PRESSURE: 70 MMHG | HEART RATE: 68 BPM | BODY MASS INDEX: 23.77 KG/M2 | TEMPERATURE: 97.1 F | OXYGEN SATURATION: 96 % | WEIGHT: 147.9 LBS | HEIGHT: 66 IN | SYSTOLIC BLOOD PRESSURE: 130 MMHG

## 2020-06-23 DIAGNOSIS — K22.70 BARRETT'S ESOPHAGUS WITHOUT DYSPLASIA: Primary | ICD-10-CM

## 2020-06-23 DIAGNOSIS — K21.9 GASTROESOPHAGEAL REFLUX DISEASE WITHOUT ESOPHAGITIS: ICD-10-CM

## 2020-06-23 DIAGNOSIS — R19.8 PROMINENT AMPULLA OF VATER: ICD-10-CM

## 2020-06-23 PROCEDURE — 99214 OFFICE O/P EST MOD 30 MIN: CPT | Performed by: NURSE PRACTITIONER

## 2020-06-23 NOTE — PROGRESS NOTES
Chief Complaint   Patient presents with   • Follow-up       HPI  Patient is a 74-year-old male who presents today for follow-up.  He was last seen in the office March 2020 for dysphagia and weight loss.    He had an EGD June 2028 with a 2 cm hiatal hernia, Schatzki's ring at the GE junction that was dilated to 20 mm, irregular Z line, prominence at the ampulla.  Esophageal biopsies were consistent with early Abreu's esophagus, small bowel biopsies were unremarkable.    An MRCP was performed to follow-up on the ampulla prominence there is no intra-or extrahepatic biliary dilatation and the caliber of the pancreatic duct was normal.  There was no abnormality seen in the pancreatic head or ampulla.  There was a 1.6 cm duodenal diverticulum noted as well as an infrarenal abdominal aortic aneurysm.    Patient presents today for follow-up after testing.  He noted no significant change in dysphagia after recent dilation.  He feels this is stable.  He continues on lansoprazole for GERD and reports good symptom control.  He denies nausea or vomiting.  Denies any abdominal pain.  Reports regular bowel movement no blood in the stool no dark stools.    Patient presents today for follow-up after testing to review results.    Review of Systems   Constitutional: Negative for appetite change, chills, diaphoresis, fatigue, fever and unexpected weight change.   HENT: Negative for dental problem, ear pain, mouth sores, rhinorrhea, sore throat and voice change.    Eyes: Negative for pain, redness and visual disturbance.   Respiratory: Negative for cough, chest tightness and wheezing.    Cardiovascular: Negative for chest pain, palpitations and leg swelling.   Endocrine: Negative for cold intolerance, heat intolerance, polydipsia, polyphagia and polyuria.   Genitourinary: Negative for dysuria, frequency, hematuria and urgency.   Musculoskeletal: Negative for arthralgias, back pain, joint swelling, myalgias and neck pain.   Skin:  Negative for rash.   Allergic/Immunologic: Negative for environmental allergies, food allergies and immunocompromised state.   Neurological: Negative for dizziness, seizures, weakness, numbness and headaches.   Hematological: Does not bruise/bleed easily.   Psychiatric/Behavioral: Negative for sleep disturbance. The patient is not nervous/anxious.         Problem List:    Patient Active Problem List   Diagnosis   • Essential hypertension   • Mixed hyperlipidemia   • Type 2 diabetes mellitus without complication, without long-term current use of insulin (CMS/[facility])   • Slow urinary stream   • Sciatica of right side   • Medicare annual wellness visit, initial   • Abnormal ankle brachial index (CAT)   • Claudication (CMS/[facility])   • CAD (coronary artery disease)   • GERD (gastroesophageal reflux disease)   • Diabetes mellitus (CMS/[facility])       Medical History:    Past Medical History:   Diagnosis Date   • CAD (coronary artery disease)    • GERD (gastroesophageal reflux disease)    • Myocardial infarction (CMS/[facility])    • PVD (peripheral vascular disease) (CMS/[facility])         Social History:    Social History     Socioeconomic History   • Marital status:      Spouse name: Not on file   • Number of children: Not on file   • Years of education: Not on file   • Highest education level: Not on file   Tobacco Use   • Smoking status: Current Some Day Smoker   • Smokeless tobacco: Never Used   • Tobacco comment: caffeine use   Substance and Sexual Activity   • Alcohol use: Yes     Comment: / socially          • Drug use: No   • Sexual activity: Defer       Family History:   Family History   Problem Relation Age of Onset   • Heart attack Mother    • Cancer Mother    • Heart disease Mother         PACEMAKER   • Heart attack Father    • Colon polyps Neg Hx    • Colon cancer Neg Hx        Surgical History:   Past Surgical History:   Procedure Laterality Date   • CARDIAC CATHETERIZATION      showed total occlusion of the RCA, mild left  main disease, and moderate disease of the circumflex.   • CORONARY ANGIOPLASTY WITH STENT PLACEMENT      angioplasty and stent placement to the circumflex         Current Outpatient Medications:   •  atorvastatin (LIPITOR) 40 MG tablet, TAKE ONE TABLET BY MOUTH DAILY, Disp: 90 tablet, Rfl: 1  •  cetirizine (ZyrTEC) 10 MG tablet, Take  by mouth daily., Disp: , Rfl:   •  clopidogrel (PLAVIX) 75 MG tablet, TAKE ONE TABLET BY MOUTH DAILY, Disp: 90 tablet, Rfl: 0  •  Digestive Enzymes (DIGESTIVE ENZYME PO), Take  by mouth., Disp: , Rfl:   •  lansoprazole (PREVACID) 15 MG capsule, Take 1 capsule by mouth Daily., Disp: 90 capsule, Rfl: 1  •  lisinopril-hydrochlorothiazide (PRINZIDE,ZESTORETIC) 20-12.5 MG per tablet, Take 1 tablet by mouth Daily., Disp: 90 tablet, Rfl: 0  •  Melatonin 10 MG capsule, Take  by mouth., Disp: , Rfl:   •  metFORMIN (GLUCOPHAGE) 500 MG tablet, Take 1 tablet by mouth 2 (Two) Times a Day With Meals., Disp: 180 tablet, Rfl: 0  •  metoprolol succinate XL (TOPROL-XL) 50 MG 24 hr tablet, TAKE ONE TABLET BY MOUTH DAILY, Disp: 86 tablet, Rfl: 0  •  Misc Natural Products (TART CHERRY ADVANCED) capsule, Take  by mouth daily., Disp: , Rfl:   •  Multiple Vitamin (MULTIVITAMIN) capsule, Take  by mouth daily., Disp: , Rfl:   •  Omega-3 Fatty Acids (FISH OIL) 1200 MG capsule capsule, Take  by mouth., Disp: , Rfl:   •  Triamcinolone Acetonide (NASACORT) 55 MCG/ACT nasal inhaler, 2 sprays into each nostril daily., Disp: , Rfl:     Allergies:  Patient has no known allergies.    The following portions of the patient's history were reviewed and updated as appropriate: allergies, current medications, past family history, past medical history, past social history, past surgical history and problem list.    Vitals:    06/23/20 0958   BP: 130/70   Pulse: 68   Temp: 97.1 °F (36.2 °C)   SpO2: 96%         06/23/20 0958   Weight: 67.1 kg (147 lb 14.4 oz)     Body mass index is 23.87 kg/m².    Physical Exam    Constitutional: He is oriented to person, place, and time. He appears well-developed and well-nourished. No distress.   HENT:   Head: Normocephalic and atraumatic.   Pulmonary/Chest: Effort normal. No respiratory distress.   Neurological: He is alert and oriented to person, place, and time.   Skin: Skin is dry. No pallor.   Psychiatric: He has a normal mood and affect. Thought content normal.   Vitals reviewed.        Assessment/ Plan  Niraj was seen today for follow-up.    Diagnoses and all orders for this visit:    Abreu's esophagus without dysplasia    Gastroesophageal reflux disease without esophagitis    Prominent ampulla of Vater         No follow-ups on file.    Patient Instructions   For GERD with Abreu's esophagus, continue lansoprazole daily.    For Abreu's esophagus, EGD recommended in 1 year for surveillance, due June 2021.    For GERD, follow antireflux precautions.  Recommend avoiding eating within 3 to 4 hours of bedtime.  Avoid foods that can trigger symptoms which may include citrus fruits, spicy foods, tomatoes and red sauces, chocolate, coffee/tea, caffeinated or carbonated beverages, alcohol.    Recommend smoking cessation due to Abreu's esophagus.    Recommend follow-up with primary care provider regarding infrarenal abdominal aortic aneurysm noted on MRCP.    Will review recent EGD and MRCP with Dr. Whelan and contact patient with further recommendations regarding possible endoscopic ultrasound or ERCP to further evaluate prominent ampulla noted on EGD.       Discussion:  Patient presents today for follow-up after testing.  We reviewed EGD and MRCP findings at today's office visit.  Discussed new diagnosis of Abreu's esophagus and need for surveillance.  We discussed also on EGD prominent appearing ampulla, MRCP was normal with no pancreatic or biliary abnormality noted.  We discussed consideration for further work-up with endoscopic ultrasound or ERCP.  These procedures were  discussed today.  Will review testing with Dr. Whelan and contact him with further recommendations regarding need for any further evaluation.      Addendum: Results reviewed with Dr. Whelan, as MRCP was normal and ampullary findings with prominence only and no evidence of mass, no further work-up recommended at this time, felt as though the prominence may have been apparent due to duodenal diverticulum identified on MRCP.  Called and discussed this with patient.  No further testing needed at this time.  We will plan on follow-up EGD in 1 year for surveillance of Abreu's esophagus and patient instructed to contact the office for any new or worsening symptoms.

## 2020-06-23 NOTE — PATIENT INSTRUCTIONS
For GERD with Abreu's esophagus, continue lansoprazole daily.    For Abreu's esophagus, EGD recommended in 1 year for surveillance, due June 2021.    For GERD, follow antireflux precautions.  Recommend avoiding eating within 3 to 4 hours of bedtime.  Avoid foods that can trigger symptoms which may include citrus fruits, spicy foods, tomatoes and red sauces, chocolate, coffee/tea, caffeinated or carbonated beverages, alcohol.    Recommend smoking cessation due to Abreu's esophagus.    Recommend follow-up with primary care provider regarding infrarenal abdominal aortic aneurysm noted on MRCP.    Will review recent EGD and MRCP with Dr. Whelan and contact patient with further recommendations regarding possible endoscopic ultrasound or ERCP to further evaluate prominent ampulla noted on EGD.

## 2020-06-30 ENCOUNTER — OFFICE VISIT (OUTPATIENT)
Dept: FAMILY MEDICINE CLINIC | Facility: CLINIC | Age: 75
End: 2020-06-30

## 2020-06-30 VITALS
OXYGEN SATURATION: 98 % | HEIGHT: 66 IN | BODY MASS INDEX: 23.85 KG/M2 | HEART RATE: 67 BPM | DIASTOLIC BLOOD PRESSURE: 75 MMHG | SYSTOLIC BLOOD PRESSURE: 150 MMHG | WEIGHT: 148.4 LBS | TEMPERATURE: 97.5 F

## 2020-06-30 DIAGNOSIS — E11.9 TYPE 2 DIABETES MELLITUS WITHOUT COMPLICATION, WITHOUT LONG-TERM CURRENT USE OF INSULIN (HCC): Primary | ICD-10-CM

## 2020-06-30 DIAGNOSIS — K21.00 GASTROESOPHAGEAL REFLUX DISEASE WITH ESOPHAGITIS: ICD-10-CM

## 2020-06-30 DIAGNOSIS — I10 ESSENTIAL HYPERTENSION: ICD-10-CM

## 2020-06-30 DIAGNOSIS — R63.0 APPETITE LOSS: ICD-10-CM

## 2020-06-30 PROCEDURE — 99214 OFFICE O/P EST MOD 30 MIN: CPT | Performed by: NURSE PRACTITIONER

## 2020-06-30 NOTE — PATIENT INSTRUCTIONS
Abdominal Aortic Aneurysm    An aneurysm is a bulge in one of the blood vessels that carry blood away from the heart (artery). It happens when blood pushes up against a weak or damaged place in the wall of an artery. An abdominal aortic aneurysm happens in the main artery of the body (aorta).  Some aneurysms may not cause problems. If it grows, it can burst or tear, causing bleeding inside the body. This is an emergency. It needs to be treated right away.  What are the causes?  The exact cause of this condition is not known.  What increases the risk?  The following may make you more likely to get this condition:  · Being a male who is 60 years of age or older.  · Being white ().  · Using tobacco.  · Having a family history of aneurysms.  · Having the following conditions:  ? Hardening of the arteries (arteriosclerosis).  ? Inflammation of the walls of an artery (arteritis).  ? Certain genetic conditions.  ? Being very overweight (obesity).  ? An infection in the wall of the aorta (infectious aortitis).  ? High cholesterol.  ? High blood pressure (hypertension).  What are the signs or symptoms?  Symptoms depend on the size of the aneurysm and how fast it is growing. Most grow slowly and do not cause any symptoms. If symptoms do occur, they may include:  · Pain in the belly (abdomen), side, or back.  · Feeling full after eating only small amounts of food.  · Feeling a throbbing lump in the belly.  Symptoms that the aneurysm has burst (ruptured) include:  · Sudden, very bad pain in the belly, side, or back.  · Feeling sick to your stomach (nauseous).  · Throwing up (vomiting).  · Feeling light-headed or passing out.  How is this treated?  Treatment for this condition depends on:  · The size of the aneurysm.  · How fast it is growing.  · Your age.  · Your risk of having it burst.  If your aneurysm is smaller than 2 inches (5 cm), your doctor may manage it by:  · Checking it often to see if it is getting bigger.  You may have an imaging test (ultrasound) to check it every 3-6 months, every year, or every few years.  · Giving you medicines to:  ? Control blood pressure.  ? Treat pain.  ? Fight infection.  If your aneurysm is larger than 2 inches (5 cm), you may need surgery to fix it.  Follow these instructions at home:  Lifestyle  · Do not use any products that have nicotine or tobacco in them. This includes cigarettes, e-cigarettes, and chewing tobacco. If you need help quitting, ask your doctor.  · Get regular exercise. Ask your doctor what types of exercise are best for you.  Eating and drinking  · Eat a heart-healthy diet. This includes eating plenty of:  ? Fresh fruits and vegetables.  ? Whole grains.  ? Low-fat (lean) protein.  ? Low-fat dairy products.  · Avoid foods that are high in saturated fat and cholesterol. These foods include red meat and some dairy products.  · Do not drink alcohol if:  ? Your doctor tells you not to drink.  ? You are pregnant, may be pregnant, or are planning to become pregnant.  · If you drink alcohol:  ? Limit how much you use to:  § 0-1 drink a day for women.  § 0-2 drinks a day for men.  ? Be aware of how much alcohol is in your drink. In the [place] one drink equals any of these:  § One typical bottle of beer (12 oz).  § One-half glass of wine (5 oz).  § One shot of hard liquor (1½ oz).  General instructions  · Take over-the-counter and prescription medicines only as told by your doctor.  · Keep your blood pressure within normal limits. Ask your doctor what your blood pressure should be.  · Have your blood sugar (glucose) level and cholesterol levels checked regularly. Keep your blood sugar level and cholesterol levels within normal limits.  · Avoid heavy lifting and activities that take a lot of effort. Ask your doctor what activities are safe for you.  · Keep all follow-up visits as told by your doctor. This is important.  ? Talk to your doctor about regular screenings to see if the  aneurysm is getting bigger.  Contact a doctor if you:  · Have pain in your belly, side, or back.  · Have a throbbing feeling in your belly.  · Have a family history of aneurysms.  Get help right away if you:  · Have sudden, bad pain in your belly, side, or back.  · Feel sick to your stomach.  · Throw up.  · Have trouble pooping (constipation).  · Have trouble peeing (urinating).  · Feel light-headed.  · Have a fast heart rate when you stand.  · Have sweaty skin that is cold to the touch (clammy).  · Have shortness of breath.  · Have a fever.  These symptoms may be an emergency. Do not wait to see if the symptoms will go away. Get medical help right away. Call your local emergency services (911 in the [place] Do not drive yourself to the hospital.  Summary  · An aneurysm is a bulge in one of the blood vessels that carry blood away from the heart (artery). Some aneurysms may not cause problems.  · You may need to have yours checked often. If it grows, it can burst or tear. This causes bleeding inside the body. It needs to be treated right away.  · Follow instructions from your doctor about healthy lifestyle changes.  · Keep all follow-up visits as told by your doctor. This is important.  This information is not intended to replace advice given to you by your health care provider. Make sure you discuss any questions you have with your health care provider.  Document Released: 04/14/2014 Document Revised: 04/06/2020 Document Reviewed: 07/27/2019  Elsevier Patient Education © 2020 [employer]

## 2020-06-30 NOTE — PROGRESS NOTES
Subjective   Niraj Hassan is a 74 y.o. male.     Chief Complaint   Patient presents with   • Aortic Aneurysm      MRI follow up    • Allergies      HPI here for T2DM, HTN, f/u and questions about aneurysm just seen on imaging.     Aneurysm:  Newly seen on MRI per GI-told not to worry about it, but wants to make sure.  It is 3.5 cm. He has not had any sx to suggest that he is having any complications.  Has read on internet about it.     HTN:  Taking and abdoulaye meds.  Not been told his BP is elevated.  Does not check at home, has a cuff.     T2DM:  Taking and abdoulaye metformin.  Tries to avoid sweets but has been eating more carbs with covid.  Would like to get his  A1C checked today.     Smoking cessation:  Has nicorette tabs-did not like patches, does not want po meds.  He feels he can continue to gradually decrease on his own and plans to continue trying.    GERD: dx with Barretts and should cont PPI.  He is not having any reflux since starting.  Next EGD is in 1 yr.      Social History     Tobacco Use   • Smoking status: Current Some Day Smoker   • Smokeless tobacco: Never Used   • Tobacco comment: caffeine use   Substance Use Topics   • Alcohol use: Yes     Comment: / socially          • Drug use: No       The following portions of the patient's history were reviewed and updated as appropriate: allergies, current medications, past family history, past medical history, past social history, past surgical history and problem list.    Review of Systems   Constitutional: Positive for appetite change (just doesnt feel hungry). Negative for activity change, fatigue and unexpected weight change (stabilized).   HENT: Negative for sore throat and trouble swallowing (feels a lump in his upper esophagus sometimes).    Respiratory: Negative for cough and shortness of breath.    Cardiovascular: Negative for chest pain and palpitations.   Gastrointestinal: Negative for abdominal pain, blood in stool, constipation, nausea and  "vomiting.   Endocrine: Negative for polydipsia and polyuria.   Genitourinary: Negative for difficulty urinating, dysuria, frequency, hematuria and urgency.   Musculoskeletal: Negative for back pain and gait problem.   Skin: Negative for rash and wound.   Neurological: Negative for dizziness, weakness and headaches.   Psychiatric/Behavioral: Negative for dysphoric mood and sleep disturbance. The patient is not nervous/anxious.        Objective   Blood pressure 150/75, pulse 67, temperature 97.5 °F (36.4 °C), temperature source Tympanic, height 167.6 cm (65.98\"), weight 67.3 kg (148 lb 6.4 oz), SpO2 98 %.  Body mass index is 23.96 kg/m².    Physical Exam   Constitutional: He is oriented to person, place, and time. He appears well-developed and well-nourished. No distress.   HENT:   Head: Normocephalic and atraumatic.   Eyes: Conjunctivae are normal. Right eye exhibits no discharge. Left eye exhibits no discharge.   Cardiovascular: Normal rate and regular rhythm.   Pulmonary/Chest: Effort normal and breath sounds normal.   Abdominal: Soft. Bowel sounds are normal. There is no tenderness.   Musculoskeletal: He exhibits no deformity.   Gait smooth and steady   Neurological: He is alert and oriented to person, place, and time.   Skin: Skin is warm and dry. He is not diaphoretic.   Psychiatric: He has a normal mood and affect.   Nursing note and vitals reviewed.      Assessment   Problem List Items Addressed This Visit        Cardiovascular and Mediastinum    Essential hypertension    Relevant Orders    Basic Metabolic Panel       Digestive    GERD (gastroesophageal reflux disease)       Endocrine    Type 2 diabetes mellitus without complication, without long-term current use of insulin (CMS/[facility]) - Primary    Relevant Orders    Hemoglobin A1c      Other Visit Diagnoses     Appetite loss        Relevant Orders    TSH Rfx On Abnormal To Free T4           Procedures           Impression and Plan:      appt mid " September    T2DM:  A1C today for stability.  Diet discussed.  Continue metformin.  Plan moving forward to check it more frequently so stays well managed.     HTN:  Usually stable-but is high today-not sure why-does not check at home.  Not sure why it is elevated today.  Importance with aneurysm to keep it pushed down-would like it in 120s/70s.  He will start checking at home and send me numbers via [email or web address].     Smoking cessation: has been gradually reducing.  Declines medication assistance.  Discussed strategies for triggers. Smoking status: I advised patient of the risks of continuing to use tobacco, and I provided her with tobacco cessation educational materials.    During this visit, I spent 5 minutes counseling the patient regarding tobacco cessation.     Aneurysm: New problem. reviewed MRI showing a 3.5 cm aortic aneurysm.  Will recheck with US in 3 months for stabilty.  Risk factors and s/s requiring emergent tx reviewed.     GERD:  Controlled with PPI and weight loss stabilized. Continue current.     Appetite loss: stabilized but will check TSH today for evaluation.     Will see him back in mid September for f/u.         Health Maintenance Due   Topic Date Due   • AAA SCREEN (ONE-TIME)  04/07/2017       The total time spent  was more than 25 min of which greater than 15 min of time (greater than 50% of the total time)  was spent face to face with the patient counseling and coordination of care on recommended evaluation and treatment options, instructions for management/treatment and /or follow up and importance of compliance with chosen management or treatment options for T2DM-diet and exercise, aneurysm, tobacco cessation.         EMR Dragon/Transcription disclaimer:   Much of this encounter note is an electronic transcription/translation of spoken language to printed text. The electronic translation of spoken language may permit erroneous, or at times, nonsensical words or phrases to be inadvertently  transcribed; Although I have reviewed the note for such errors, some may still exist.          Answers for HPI/ROS submitted by the patient on 6/24/2020   What is the primary reason for your visit?: Other  Please describe your symptoms.: MRI requested by Dr Whelan showed aneurism. Would like to discuss that.  Have you had these symptoms before?: No  How long have you been having these symptoms?: 5-7 days

## 2020-07-01 LAB
BUN SERPL-MCNC: 19 MG/DL (ref 8–23)
BUN/CREAT SERPL: 22.6 (ref 7–25)
CALCIUM SERPL-MCNC: 9.9 MG/DL (ref 8.6–10.5)
CHLORIDE SERPL-SCNC: 103 MMOL/L (ref 98–107)
CO2 SERPL-SCNC: 26.9 MMOL/L (ref 22–29)
CREAT SERPL-MCNC: 0.84 MG/DL (ref 0.76–1.27)
GLUCOSE SERPL-MCNC: 196 MG/DL (ref 65–99)
HBA1C MFR BLD: 8.3 % (ref 4.8–5.6)
POTASSIUM SERPL-SCNC: 5.2 MMOL/L (ref 3.5–5.2)
SODIUM SERPL-SCNC: 141 MMOL/L (ref 136–145)
TSH SERPL DL<=0.005 MIU/L-ACNC: 1.58 UIU/ML (ref 0.27–4.2)

## 2020-07-13 ENCOUNTER — PATIENT MESSAGE (OUTPATIENT)
Dept: FAMILY MEDICINE CLINIC | Facility: CLINIC | Age: 75
End: 2020-07-13

## 2020-07-13 ENCOUNTER — OFFICE VISIT (OUTPATIENT)
Dept: FAMILY MEDICINE CLINIC | Facility: CLINIC | Age: 75
End: 2020-07-13

## 2020-07-13 VITALS
HEART RATE: 50 BPM | OXYGEN SATURATION: 99 % | WEIGHT: 143.31 LBS | TEMPERATURE: 97.7 F | DIASTOLIC BLOOD PRESSURE: 85 MMHG | HEIGHT: 66 IN | BODY MASS INDEX: 23.03 KG/M2 | SYSTOLIC BLOOD PRESSURE: 148 MMHG

## 2020-07-13 DIAGNOSIS — I73.9 CLAUDICATION OF RIGHT LOWER EXTREMITY (HCC): Primary | ICD-10-CM

## 2020-07-13 DIAGNOSIS — G62.9 NEUROPATHY: ICD-10-CM

## 2020-07-13 DIAGNOSIS — E11.9 TYPE 2 DIABETES MELLITUS WITHOUT COMPLICATION, WITHOUT LONG-TERM CURRENT USE OF INSULIN (HCC): ICD-10-CM

## 2020-07-13 PROBLEM — L80 VITILIGO: Status: ACTIVE | Noted: 2020-07-13

## 2020-07-13 PROCEDURE — 99214 OFFICE O/P EST MOD 30 MIN: CPT | Performed by: NURSE PRACTITIONER

## 2020-07-13 NOTE — PATIENT INSTRUCTIONS
Intermittent Claudication  Intermittent claudication is pain in one or both legs that occurs when walking or exercising and goes away when resting. Intermittent claudication is a symptom of peripheral arterial disease (PAD). This condition is commonly treated with rest, medicine, and healthy lifestyle changes. If medical management does not improve symptoms, surgery can be done to restore blood flow (revascularization) to the affected leg.  What are the causes?    This condition is caused by buildup of fatty material (plaque) within the major arteries in the body (atherosclerosis). Plaque makes arteries stiff and narrow, which prevents enough blood from reaching the leg muscles. Pain occurs when you walk or exercise because your muscles need (but cannot get) more blood when you are moving and exercising.  What increases the risk?  The following factors may make you more likely to develop this condition:  · A family history of atherosclerosis.  · A personal history of stroke or heart disease.  · Older age.  · Being inactive (sedentary lifestyle).  · Being overweight.  · Smoking cigarettes.  · Having another health condition such as:  ? Diabetes.  ? High blood pressure.  ? High cholesterol.  What are the signs or symptoms?  Symptoms of this condition may first develop in the lower leg, and then they may spread to the thigh, hip, buttock, or the back of the lower leg (calf) over time. Symptoms may include:  · Aches or pains.  · Cramps.  · A feeling of tightness, weakness, or heaviness.  · A wound on the lower leg or foot that heals poorly or does not heal.  How is this diagnosed?  This condition may be diagnosed based on:  · Your symptoms.  · Your medical history.  · Tests, such as:  ? Blood tests.  ? Arterial duplex ultrasound. This test uses images of blood vessels and surrounding organs to evaluate blood flow within arteries.  ? Angiogram. In this procedure, dye is injected into arteries and then X-rays are  taken.  ? Magnetic resonance angiogram (MRA). In this procedure, strong magnets and radio waves are used instead of X-rays to create images of blood vessels and blood flow.  ? CT angiogram (CTA). In this procedure, a large X-ray machine called a CT scanner takes detailed pictures of blood vessels that have been injected with dye.  ? Ankle-brachial index (CAT) test. This procedure measures blood pressure in the leg during exercise and at rest.  ? Exercise test. For this test, you will walk on a treadmill while tests are done (such as the CAT test) to evaluate how this condition affects your ability to walk or exercise.  How is this treated?  Treatment for this condition may involve treatment for the underlying cause, such as treatment for high blood pressure, high cholesterol, or diabetes. Treatment may include:  · Lifestyle changes such as:  ? Starting a supervised or home-based exercise program.  ? Losing weight.  ? Quitting smoking.  · Medicines to help restore blood flow through your legs.  · Blood vessel surgery (angioplasty) to restore blood flow around the blocked vessel. This is also known as endovascular therapy (EVT). This is only done if your intermittent claudication is caused by severe peripheral artery disease, a condition in which blood flow is severely or totally restricted by the narrowing of the arteries.  Follow these instructions at home:  Lifestyle    · Maintain a healthy weight.  · Eat a diet that is low in saturated fats and calories. Consider working with a diet and nutrition specialist (dietitian) to help you make healthy food choices.  · Do not use any products that contain nicotine or tobacco, such as cigarettes and e-cigarettes. If you need help quitting, ask your health care provider.  · If your health care provider recommended an exercise program for you, follow it as directed. Your exercise program may involve:  ? Walking 3 or more times a week.  ? Walking until you have certain  symptoms of intermittent claudication.  ? Resting until symptoms go away.  ? Gradually increasing your walking time to about 50 minutes a day.  General instructions  · Work with your health care provider to manage any other health conditions you may have, including diabetes, high blood pressure, or high cholesterol.  · Take over-the-counter and prescription medicines only as told by your health care provider.  · Keep all follow-up visits as told by your health care provider. This is important.  Contact a health care provider if:  · Your pain does not go away with rest.  · You have sores on your legs that do not heal or have a bad smell or pus coming from them.  · Your condition gets worse or does not get better with treatment.  Get help right away if:  · You have chest pain.  · You have difficulty breathing.  · You develop arm weakness.  · You have trouble speaking.  · Your face begins to droop.  · Your foot or leg is cold or it changes color.  · Your foot or leg becomes numb.  These symptoms may represent a serious problem that is an emergency. Do not wait to see if the symptoms will go away. Get medical help right away. Call your local emergency services (911 in the [place] Do not drive yourself to the hospital.   Summary  · Intermittent claudication is pain in one or both legs that occurs when walking or exercising and goes away when resting.  · This condition is caused by buildup of fatty material (plaque) within the major arteries in the body (atherosclerosis). Plaque makes arteries stiff and narrow, which prevents enough blood from reaching the leg muscles.  · Intermittent claudication can be treated with medicine and lifestyle changes. If medical treatment fails, surgery can be done to help return blood flow to the affected area.  · Make sure you work with your health care provider to manage any other health conditions you may have, including diabetes, high blood pressure, or high cholesterol.  This  information is not intended to replace advice given to you by your health care provider. Make sure you discuss any questions you have with your health care provider.  Document Released: 10/20/2005 Document Revised: 11/30/2018 Document Reviewed: 01/18/2018  Elsevier Patient Education © 2020 [employer]

## 2020-07-13 NOTE — PROGRESS NOTES
Subjective   Niraj Hassan is a 74 y.o. male.     Chief Complaint   Patient presents with   • Numbness     C/o rt foot constantly numb parts of shin and calf start hurting when walking distances, Hallux and pinky toe red denies movement of feeling/pain, no swelling, Hx of Arterial bypass      HPI  Pt is here for right leg pain with walking that is new.  It began just after his last appt at the end of June.  It resolves soon after sitting down and rubbing is seems to help.  Pain is in calf and anterior shin.  He has seen Dr. Brooke in past for vascular problem in his left leg.  He has history of blockage in right groin femoral vein and had bypass in past.    He has also noticed right foot numbness in his right great toe and along the medial aspect of his foot and additionally has numbness in his right fifth toe.  These began when he increased his metformin dose at HS.  He has never been seen by podiatry for diabetic foot care in past.     Social History     Tobacco Use   • Smoking status: Current Some Day Smoker   • Smokeless tobacco: Never Used   • Tobacco comment: caffeine use   Substance Use Topics   • Alcohol use: Yes     Comment: / socially          • Drug use: No       The following portions of the patient's history were reviewed and updated as appropriate: allergies, current medications, past family history, past medical history, past social history, past surgical history and problem list.    Review of Systems   Constitutional: Negative for activity change and appetite change.   Respiratory: Negative for cough and shortness of breath.    Cardiovascular: Negative for chest pain, palpitations and leg swelling.   Gastrointestinal: Negative for abdominal pain, diarrhea, nausea and vomiting.   Endocrine: Negative for polydipsia and polyuria.   Musculoskeletal: Negative for gait problem and joint swelling.   Neurological: Negative for dizziness, weakness, light-headedness and headaches.       Objective   Blood  "pressure 148/85, pulse 50, temperature 97.7 °F (36.5 °C), height 167.6 cm (66\"), weight 65 kg (143 lb 5 oz), SpO2 99 %.  Body mass index is 23.13 kg/m².    Physical Exam   Constitutional: He is oriented to person, place, and time. He appears well-developed and well-nourished. No distress.   HENT:   Head: Normocephalic and atraumatic.   Eyes: Conjunctivae are normal. Right eye exhibits no discharge. Left eye exhibits no discharge.   Cardiovascular: Normal rate and regular rhythm.   Pulmonary/Chest: Effort normal and breath sounds normal.   Abdominal: Soft. Bowel sounds are normal. There is no tenderness.   Musculoskeletal: He exhibits no deformity.   Gait smooth and steady  Right leg without swelling, erythema, NTTP, FROM, no ulcers or wounds. Skin warm and dry  Right pedal pulse not palpable-cap refill is good and his toes are warm   Neurological: He is alert and oriented to person, place, and time.   Skin: Skin is warm and dry. He is not diaphoretic.   Vitiligo noted   Psychiatric: He has a normal mood and affect.   Nursing note and vitals reviewed.      Assessment   Problem List Items Addressed This Visit     None      Visit Diagnoses     Claudication of right lower extremity (CMS/HCC)    -  Primary    Neuropathy               Procedures           Impression and Plan:  I think his leg pain is claudication.  We discussed need to stop smoking again which he is working on.  Reviewed his past b/l leg dopplers and he did have mild obstructive disease in right and severe obstructive disease in left leg.  Per Dr. Brooke's note in past he was started on pletal and pt never appears to have continued this or followed up. He has had previous blockage and fem pop in past.  I will refer him for re-eval.  Neuropathic sx in foot could be R/T poor blood flow but will check B12.  I will also refer to podiatry for diabetic foot care and to eval for any pressure points in current shoes.    Will continue current metformin for now. "   BP is up a little-want him to monitor at home and let me know how it is running.     We discussed s/s requiring emergent tx.       Health Maintenance Due   Topic Date Due   • AAA SCREEN (ONE-TIME)  04/07/2017              EMR Dragon/Transcription disclaimer:   Much of this encounter note is an electronic transcription/translation of spoken language to printed text. The electronic translation of spoken language may permit erroneous, or at times, nonsensical words or phrases to be inadvertently transcribed; Although I have reviewed the note for such errors, some may still exist.      The total time spent  was more than 25 min of which greater than 15 min of time (greater than 50% of the total time)  was spent face to face with the patient counseling and coordination of care on recommended evaluation and treatment options, instructions for management/treatment and /or follow up and importance of compliance with chosen management or treatment options.

## 2020-07-14 LAB — VIT B12 SERPL-MCNC: 564 PG/ML (ref 211–946)

## 2020-08-03 DIAGNOSIS — G62.9 NEUROPATHY: Primary | ICD-10-CM

## 2020-08-03 RX ORDER — GABAPENTIN 100 MG/1
100 CAPSULE ORAL NIGHTLY
Qty: 30 CAPSULE | Refills: 0 | Status: SHIPPED | OUTPATIENT
Start: 2020-08-03 | End: 2020-08-24 | Stop reason: DRUGHIGH

## 2020-08-04 RX ORDER — METOPROLOL SUCCINATE 50 MG/1
TABLET, EXTENDED RELEASE ORAL
Qty: 90 TABLET | Refills: 1 | Status: SHIPPED | OUTPATIENT
Start: 2020-08-04 | End: 2021-01-28

## 2020-08-11 DIAGNOSIS — I73.9 CLAUDICATION OF RIGHT LOWER EXTREMITY (HCC): Primary | ICD-10-CM

## 2020-08-18 DIAGNOSIS — E11.69 HYPERLIPIDEMIA ASSOCIATED WITH TYPE 2 DIABETES MELLITUS (HCC): ICD-10-CM

## 2020-08-18 DIAGNOSIS — E78.1 HIGH TRIGLYCERIDES: ICD-10-CM

## 2020-08-18 DIAGNOSIS — E78.5 HYPERLIPIDEMIA ASSOCIATED WITH TYPE 2 DIABETES MELLITUS (HCC): ICD-10-CM

## 2020-08-18 RX ORDER — ATORVASTATIN CALCIUM 40 MG/1
TABLET, FILM COATED ORAL
Qty: 90 TABLET | Refills: 1 | Status: SHIPPED | OUTPATIENT
Start: 2020-08-18 | End: 2021-02-16

## 2020-08-24 DIAGNOSIS — G62.9 NEUROPATHY: Primary | ICD-10-CM

## 2020-08-24 RX ORDER — GABAPENTIN 300 MG/1
300 CAPSULE ORAL 3 TIMES DAILY
Qty: 90 CAPSULE | Refills: 1 | Status: SHIPPED | OUTPATIENT
Start: 2020-08-24 | End: 2020-08-31 | Stop reason: SDUPTHER

## 2020-08-31 DIAGNOSIS — G62.9 NEUROPATHY: ICD-10-CM

## 2020-08-31 RX ORDER — GABAPENTIN 300 MG/1
300 CAPSULE ORAL 3 TIMES DAILY
Qty: 90 CAPSULE | Refills: 2 | Status: SHIPPED | OUTPATIENT
Start: 2020-08-31 | End: 2020-09-22 | Stop reason: SDUPTHER

## 2020-09-03 ENCOUNTER — OFFICE VISIT (OUTPATIENT)
Dept: CARDIOLOGY | Facility: CLINIC | Age: 75
End: 2020-09-03

## 2020-09-03 VITALS
OXYGEN SATURATION: 98 % | RESPIRATION RATE: 18 BRPM | HEART RATE: 79 BPM | SYSTOLIC BLOOD PRESSURE: 132 MMHG | BODY MASS INDEX: 23.04 KG/M2 | DIASTOLIC BLOOD PRESSURE: 82 MMHG | HEIGHT: 68 IN | WEIGHT: 152 LBS

## 2020-09-03 DIAGNOSIS — I73.9 CLAUDICATION (HCC): Primary | ICD-10-CM

## 2020-09-03 DIAGNOSIS — I70.421: ICD-10-CM

## 2020-09-03 PROCEDURE — 99214 OFFICE O/P EST MOD 30 MIN: CPT | Performed by: INTERNAL MEDICINE

## 2020-09-03 NOTE — PROGRESS NOTES
Niraj Hassan  1945  Date of Office Visit:09/03/20  Encounter Provider: Shamir Manriquez MD  Place of Service: [facility] CARDIOLOG[facility]      CHIEF COMPLAINT:   Claudication  Peripheral arterial disease  Abnormal CAT  Prior fem-pop bypass    HISTORY OF PRESENT ILLNESS:  I had the pleasure of seeing Mr. Niraj Hassan in f/u today. As you well know, Mr. Hassan is a pleasant 75-year-old male with a medical history of prior lateral wall myocardial infarction, angioplasty to circumflex, repeat catheterization in 2006 with just mild disease of the circumflex and  of the RCA with left to right collaterals. He was noted to have a circumflex infarction following that and his circumflex was noted to be occluded. Unfortunately, due to the timing of this he was treated medically.    The patient's FEM-POP is actually on the right, and he presented to me initially secondary to left lower extremity discomfort with walking that was consistent with claudication. He had a duplex at that time that showed obstructive disease towards the popliteal artery.  Pletal was supposed to be started, however, his pharmacist had questions and primary care said that he was not to take that. I do no really have additional details on that, but he states that his symptoms on his left resolved by wearing a copper band on his leg.  He now reports right lower extremity discomfort when he wakes up.  It is tingling and he also has some numbness.  In addition, he states that when he walks he will have discomfort of his right lower extremity that is moderate in intensity and resolves with rest.  He comes back for follow up.  He denies any nonhealing ulcerations.          Review of Systems   Constitution: Negative for fever and malaise/fatigue.   HENT: Negative for nosebleeds and sore throat.    Eyes: Negative for blurred vision and double vision.   Cardiovascular: Negative for chest pain, claudication, palpitations  and syncope.   Respiratory: Negative for cough, shortness of breath and snoring.    Endocrine: Negative for cold intolerance, heat intolerance and polydipsia.   Skin: Negative for itching, poor wound healing and rash.   Musculoskeletal: Negative for joint pain, joint swelling, muscle weakness and myalgias.   Gastrointestinal: Negative for abdominal pain, melena, nausea and vomiting.   Neurological: Negative for light-headedness, loss of balance, seizures, vertigo and weakness.   Psychiatric/Behavioral: Negative for altered mental status and depression.        Past Medical History:   Diagnosis Date   • CAD (coronary artery disease)    • GERD (gastroesophageal reflux disease)    • Myocardial infarction (CMS/[facility])    • PVD (peripheral vascular disease) (CMS/[facility])        The following portions of the patient's history were reviewed and updated as appropriate: Social history , Family history and Surgical history     Current Outpatient Medications on File Prior to Visit   Medication Sig Dispense Refill   • atorvastatin (LIPITOR) 40 MG tablet TAKE ONE TABLET BY MOUTH DAILY 90 tablet 1   • cetirizine (ZyrTEC) 10 MG tablet Take  by mouth daily.     • clopidogrel (PLAVIX) 75 MG tablet TAKE ONE TABLET BY MOUTH DAILY 90 tablet 0   • Digestive Enzymes (DIGESTIVE ENZYME PO) Take  by mouth.     • gabapentin (NEURONTIN) 300 MG capsule Take 1 capsule by mouth 3 (Three) Times a Day. 90 capsule 2   • lansoprazole (PREVACID) 15 MG capsule Take 1 capsule by mouth Daily. 90 capsule 1   • lisinopril-hydrochlorothiazide (PRINZIDE,ZESTORETIC) 20-12.5 MG per tablet Take 1 tablet by mouth Daily. 90 tablet 0   • Melatonin 10 MG capsule Take  by mouth.     • metFORMIN (GLUCOPHAGE) 500 MG tablet Take 2 tablets by mouth 2 (Two) Times a Day With Meals. 120 tablet 3   • metoprolol succinate XL (TOPROL-XL) 50 MG 24 hr tablet TAKE ONE TABLET BY MOUTH DAILY 90 tablet 1   • Misc Natural Products (TART CHERRY ADVANCED) capsule Take  by mouth daily.     •  "Multiple Vitamin (MULTIVITAMIN) capsule Take  by mouth daily.     • Omega-3 Fatty Acids (FISH OIL) 1200 MG capsule capsule Take  by mouth.     • Triamcinolone Acetonide (NASACORT) 55 MCG/ACT nasal inhaler 2 sprays into each nostril daily.       No current facility-administered medications on file prior to visit.        No Known Allergies    Vitals:    09/03/20 1133   BP: 132/82   BP Location: Right arm   Patient Position: Sitting   Cuff Size: Adult   Pulse: 79   Resp: 18   SpO2: 98%   Weight: 68.9 kg (152 lb)   Height: 172.7 cm (68\")     Physical Exam   Constitutional: He is oriented to person, place, and time. He appears well-developed and well-nourished.   HENT:   Head: Normocephalic and atraumatic.   Eyes: Conjunctivae and EOM are normal. No scleral icterus.   Neck: Normal range of motion. Neck supple. Normal carotid pulses, no hepatojugular reflux and no JVD present. Carotid bruit is not present. No tracheal deviation present. No thyromegaly present.   Cardiovascular: Normal rate and regular rhythm. Exam reveals no gallop and no friction rub.   No murmur heard.  Pulses:       Carotid pulses are 2+ on the right side, and 2+ on the left side.       Radial pulses are 2+ on the right side, and 2+ on the left side.        Femoral pulses are 2+ on the right side, and 2+ on the left side.       Dorsalis pedis pulses are 0 on the right side, and 1+ on the left side.        Posterior tibial pulses are 1+ on the right side, and 1+ on the left side.   Pulmonary/Chest: Breath sounds normal. No respiratory distress. He has no decreased breath sounds. He has no wheezes. He has no rhonchi. He has no rales. He exhibits no tenderness.   Abdominal: Soft. Bowel sounds are normal. He exhibits no distension. There is no tenderness. There is no rebound.   Musculoskeletal: He exhibits no edema or deformity.   Neurological: He is alert and oriented to person, place, and time. He has normal strength. No sensory deficit.   Skin: No rash " noted. No erythema.        Psychiatric: He has a normal mood and affect. His behavior is normal.       No results found for: CBC  No results found for: CMP  No components found for: LIPID  No results found for: BMP    Procedures   1/11/19  · Mild obstructive disease involving the right superficial femoral/popliteal arterial system.  · Severe obstructive disease involving the left superficial femoral/popliteal arterial system.    DISCUSSION/SUMMARYA 73-year-old male with medical history of peripheral arterial disease with right-sided fem-pop bypass, CAD with chronic total occlusions of the circumflex and RCA, ischemic cardiomyopathy, who presents back for follow-up.  His left lower extremity discomfort has resolved.  He states he has right lower extremity discomfort which does sound predominantly neuropathic.  He does have some discomfort with ambulation that resolves with rest and has a previous right sided bypass.  I do think additional evaluation is warranted.      -CT angiography with runoff of the lower extremities will be ordered.  If there is a significant issue with his graft I will refer him to vascular surgery for additional evaluation  -Patient has nerve conduction study scheduled and I do agree with that as well.

## 2020-09-16 RX ORDER — CLOPIDOGREL BISULFATE 75 MG/1
TABLET ORAL
Qty: 90 TABLET | Refills: 1 | Status: ON HOLD | OUTPATIENT
Start: 2020-09-16 | End: 2021-03-12

## 2020-09-19 ENCOUNTER — HOSPITAL ENCOUNTER (OUTPATIENT)
Dept: CT IMAGING | Facility: HOSPITAL | Age: 75
Discharge: HOME | End: 2020-09-19
Admitting: INTERNAL MEDICINE

## 2020-09-19 DIAGNOSIS — I70.421: ICD-10-CM

## 2020-09-19 DIAGNOSIS — I73.9 CLAUDICATION (HCC): ICD-10-CM

## 2020-09-19 LAB — CREAT BLDA-MCNC: 0.8 MG/DL (ref 0.6–1.3)

## 2020-09-19 PROCEDURE — 0 IOPAMIDOL PER 1 ML: Performed by: INTERNAL MEDICINE

## 2020-09-19 PROCEDURE — 75635 CT ANGIO ABDOMINAL ARTERIES: CPT

## 2020-09-19 PROCEDURE — 82565 ASSAY OF CREATININE: CPT

## 2020-09-19 RX ADMIN — IOPAMIDOL 95 ML: 755 INJECTION, SOLUTION INTRAVENOUS at 10:59

## 2020-09-22 DIAGNOSIS — G62.9 NEUROPATHY: ICD-10-CM

## 2020-09-22 DIAGNOSIS — I73.9 PERIPHERAL ARTERIAL DISEASE (HCC): Primary | ICD-10-CM

## 2020-09-22 RX ORDER — GABAPENTIN 300 MG/1
300 CAPSULE ORAL 3 TIMES DAILY
Qty: 90 CAPSULE | Refills: 2 | Status: SHIPPED | OUTPATIENT
Start: 2020-09-22 | End: 2021-02-16 | Stop reason: SDUPTHER

## 2020-09-28 RX ORDER — LISINOPRIL AND HYDROCHLOROTHIAZIDE 20; 12.5 MG/1; MG/1
TABLET ORAL
Qty: 90 TABLET | Refills: 0 | Status: SHIPPED | OUTPATIENT
Start: 2020-09-28 | End: 2020-12-29

## 2020-09-30 ENCOUNTER — OFFICE VISIT (OUTPATIENT)
Dept: FAMILY MEDICINE CLINIC | Facility: CLINIC | Age: 75
End: 2020-09-30

## 2020-09-30 VITALS
HEART RATE: 74 BPM | OXYGEN SATURATION: 90 % | SYSTOLIC BLOOD PRESSURE: 139 MMHG | BODY MASS INDEX: 23.12 KG/M2 | WEIGHT: 152.54 LBS | HEIGHT: 68 IN | DIASTOLIC BLOOD PRESSURE: 79 MMHG | TEMPERATURE: 96.9 F

## 2020-09-30 DIAGNOSIS — E11.40 TYPE 2 DIABETES MELLITUS WITH DIABETIC NEUROPATHY, WITHOUT LONG-TERM CURRENT USE OF INSULIN (HCC): ICD-10-CM

## 2020-09-30 DIAGNOSIS — Z00.00 MEDICARE ANNUAL WELLNESS VISIT, SUBSEQUENT: ICD-10-CM

## 2020-09-30 DIAGNOSIS — I73.9 CLAUDICATION (HCC): ICD-10-CM

## 2020-09-30 DIAGNOSIS — I10 ESSENTIAL HYPERTENSION: Primary | ICD-10-CM

## 2020-09-30 DIAGNOSIS — E11.9 TYPE 2 DIABETES MELLITUS WITHOUT COMPLICATION, WITHOUT LONG-TERM CURRENT USE OF INSULIN (HCC): ICD-10-CM

## 2020-09-30 DIAGNOSIS — M89.9 DISORDER OF BONE, UNSPECIFIED: ICD-10-CM

## 2020-09-30 DIAGNOSIS — R42 DIZZINESS: ICD-10-CM

## 2020-09-30 DIAGNOSIS — I73.9 PAD (PERIPHERAL ARTERY DISEASE) (HCC): ICD-10-CM

## 2020-09-30 PROCEDURE — 99214 OFFICE O/P EST MOD 30 MIN: CPT | Performed by: NURSE PRACTITIONER

## 2020-09-30 PROCEDURE — G0439 PPPS, SUBSEQ VISIT: HCPCS | Performed by: NURSE PRACTITIONER

## 2020-09-30 PROCEDURE — 96160 PT-FOCUSED HLTH RISK ASSMT: CPT | Performed by: NURSE PRACTITIONER

## 2020-10-01 LAB
25(OH)D3+25(OH)D2 SERPL-MCNC: 36.6 NG/ML (ref 30–100)
ALBUMIN/CREAT UR: 10 MG/G CREAT (ref 0–29)
CREAT UR-MCNC: 174.5 MG/DL
HBA1C MFR BLD: 7.8 % (ref 4.8–5.6)
MICROALBUMIN UR-MCNC: 16.7 UG/ML

## 2020-10-09 ENCOUNTER — HOSPITAL ENCOUNTER (OUTPATIENT)
Dept: CARDIOLOGY | Facility: HOSPITAL | Age: 75
Discharge: HOME | End: 2020-10-09
Admitting: NURSE PRACTITIONER

## 2020-10-09 DIAGNOSIS — I73.9 PAD (PERIPHERAL ARTERY DISEASE) (HCC): ICD-10-CM

## 2020-10-09 DIAGNOSIS — R42 DIZZINESS: ICD-10-CM

## 2020-10-09 LAB
BH CV XLRA MEAS LEFT DIST CCA EDV: 19 CM/SEC
BH CV XLRA MEAS LEFT DIST CCA PSV: 76 CM/SEC
BH CV XLRA MEAS LEFT DIST ICA EDV: 30 CM/SEC
BH CV XLRA MEAS LEFT DIST ICA PSV: 82 CM/SEC
BH CV XLRA MEAS LEFT ICA/CCA RATIO: 1
BH CV XLRA MEAS LEFT MID ICA EDV: 25 CM/SEC
BH CV XLRA MEAS LEFT MID ICA PSV: 83 CM/SEC
BH CV XLRA MEAS LEFT PROX CCA EDV: 21 CM/SEC
BH CV XLRA MEAS LEFT PROX CCA PSV: 99 CM/SEC
BH CV XLRA MEAS LEFT PROX ECA EDV: 15 CM/SEC
BH CV XLRA MEAS LEFT PROX ECA PSV: 104 CM/SEC
BH CV XLRA MEAS LEFT PROX ICA EDV: 28 CM/SEC
BH CV XLRA MEAS LEFT PROX ICA PSV: 76 CM/SEC
BH CV XLRA MEAS LEFT PROX SCLA EDV: 0 CM/SEC
BH CV XLRA MEAS LEFT PROX SCLA PSV: 173 CM/SEC
BH CV XLRA MEAS LEFT VERTEBRAL A EDV: 9 CM/SEC
BH CV XLRA MEAS LEFT VERTEBRAL A PSV: 34 CM/SEC
BH CV XLRA MEAS RIGHT DIST CCA EDV: 14 CM/SEC
BH CV XLRA MEAS RIGHT DIST CCA PSV: 62 CM/SEC
BH CV XLRA MEAS RIGHT DIST ICA EDV: 33 CM/SEC
BH CV XLRA MEAS RIGHT DIST ICA PSV: 99 CM/SEC
BH CV XLRA MEAS RIGHT ICA/CCA RATIO: 1.66
BH CV XLRA MEAS RIGHT MID ICA EDV: 18 CM/SEC
BH CV XLRA MEAS RIGHT MID ICA PSV: 76 CM/SEC
BH CV XLRA MEAS RIGHT PROX CCA EDV: 11 CM/SEC
BH CV XLRA MEAS RIGHT PROX CCA PSV: 72 CM/SEC
BH CV XLRA MEAS RIGHT PROX ECA EDV: 14 CM/SEC
BH CV XLRA MEAS RIGHT PROX ECA PSV: 110 CM/SEC
BH CV XLRA MEAS RIGHT PROX ICA EDV: 29 CM/SEC
BH CV XLRA MEAS RIGHT PROX ICA PSV: 103 CM/SEC
BH CV XLRA MEAS RIGHT PROX SCLA EDV: 0 CM/SEC
BH CV XLRA MEAS RIGHT PROX SCLA PSV: 69 CM/SEC
BH CV XLRA MEAS RIGHT VERTEBRAL A EDV: 9 CM/SEC
BH CV XLRA MEAS RIGHT VERTEBRAL A PSV: 38 CM/SEC
LEFT ARM BP: NORMAL MMHG
RIGHT ARM BP: NORMAL MMHG

## 2020-10-09 PROCEDURE — 93880 EXTRACRANIAL BILAT STUDY: CPT

## 2020-12-29 RX ORDER — LISINOPRIL AND HYDROCHLOROTHIAZIDE 20; 12.5 MG/1; MG/1
TABLET ORAL
Qty: 90 TABLET | Refills: 0 | Status: SHIPPED | OUTPATIENT
Start: 2020-12-29 | End: 2021-03-30

## 2021-01-20 DIAGNOSIS — E11.9 TYPE 2 DIABETES MELLITUS WITHOUT COMPLICATION, WITHOUT LONG-TERM CURRENT USE OF INSULIN (HCC): Primary | ICD-10-CM

## 2021-01-20 NOTE — TELEPHONE ENCOUNTER
Please let pt know he needs an A1C and then a follow up televisit to discuss-I have put the orders in for labs to get in next couple of weeks-SW

## 2021-01-22 ENCOUNTER — LAB (OUTPATIENT)
Dept: LAB | Facility: HOSPITAL | Age: 76
End: 2021-01-22

## 2021-01-22 DIAGNOSIS — E11.9 TYPE 2 DIABETES MELLITUS WITHOUT COMPLICATION, WITHOUT LONG-TERM CURRENT USE OF INSULIN (HCC): ICD-10-CM

## 2021-01-22 LAB
ANION GAP SERPL CALCULATED.3IONS-SCNC: 9.8 MMOL/L (ref 5–15)
BUN SERPL-MCNC: 14 MG/DL (ref 8–23)
BUN/CREAT SERPL: 15.6 (ref 7–25)
CALCIUM SPEC-SCNC: 10.1 MG/DL (ref 8.6–10.5)
CHLORIDE SERPL-SCNC: 103 MMOL/L (ref 98–107)
CO2 SERPL-SCNC: 27.2 MMOL/L (ref 22–29)
CREAT SERPL-MCNC: 0.9 MG/DL (ref 0.76–1.27)
GFR SERPL CREATININE-BSD FRML MDRD: 82 ML/MIN/1.73
GLUCOSE SERPL-MCNC: 181 MG/DL (ref 65–99)
HBA1C MFR BLD: 8.22 % (ref 4.8–5.6)
POTASSIUM SERPL-SCNC: 4.5 MMOL/L (ref 3.5–5.2)
SODIUM SERPL-SCNC: 140 MMOL/L (ref 136–145)

## 2021-01-22 PROCEDURE — 36415 COLL VENOUS BLD VENIPUNCTURE: CPT

## 2021-01-22 PROCEDURE — 83036 HEMOGLOBIN GLYCOSYLATED A1C: CPT

## 2021-01-22 PROCEDURE — 80048 BASIC METABOLIC PNL TOTAL CA: CPT

## 2021-01-26 ENCOUNTER — OFFICE VISIT (OUTPATIENT)
Dept: FAMILY MEDICINE CLINIC | Facility: CLINIC | Age: 76
End: 2021-01-26

## 2021-01-26 ENCOUNTER — HOSPITAL ENCOUNTER (OUTPATIENT)
Dept: GENERAL RADIOLOGY | Facility: HOSPITAL | Age: 76
Discharge: HOME | End: 2021-01-26
Admitting: NURSE PRACTITIONER

## 2021-01-26 VITALS
SYSTOLIC BLOOD PRESSURE: 164 MMHG | DIASTOLIC BLOOD PRESSURE: 89 MMHG | HEIGHT: 68 IN | HEART RATE: 87 BPM | BODY MASS INDEX: 23.39 KG/M2 | TEMPERATURE: 97.1 F | OXYGEN SATURATION: 98 % | WEIGHT: 154.3 LBS

## 2021-01-26 DIAGNOSIS — E11.40 TYPE 2 DIABETES MELLITUS WITH DIABETIC NEUROPATHY, WITHOUT LONG-TERM CURRENT USE OF INSULIN (HCC): ICD-10-CM

## 2021-01-26 DIAGNOSIS — M79.2 NEUROPATHIC PAIN: Primary | ICD-10-CM

## 2021-01-26 DIAGNOSIS — K52.9 CHRONIC DIARRHEA: ICD-10-CM

## 2021-01-26 DIAGNOSIS — F17.210 CIGARETTE NICOTINE DEPENDENCE WITHOUT COMPLICATION: ICD-10-CM

## 2021-01-26 PROCEDURE — 72110 X-RAY EXAM L-2 SPINE 4/>VWS: CPT

## 2021-01-26 PROCEDURE — 99214 OFFICE O/P EST MOD 30 MIN: CPT | Performed by: NURSE PRACTITIONER

## 2021-01-26 RX ORDER — GLIPIZIDE 5 MG/1
5 TABLET ORAL
Qty: 30 TABLET | Refills: 1 | Status: SHIPPED | OUTPATIENT
Start: 2021-01-26 | End: 2021-03-23

## 2021-01-26 RX ORDER — NICOTINE 21 MG/24HR
1 PATCH, TRANSDERMAL 24 HOURS TRANSDERMAL EVERY 24 HOURS
Qty: 28 PATCH | Refills: 1 | Status: SHIPPED | OUTPATIENT
Start: 2021-01-26 | End: 2021-10-06

## 2021-01-26 NOTE — PROGRESS NOTES
Answers for HPI/ROS submitted by the patient on 1/22/2021   Diabetes problem  What is the primary reason for your visit?: Diabetes  Diabetes type: type 2  MedicAlert ID: No  Disease duration: 2 years  blurred vision: No  chest pain: No  fatigue: No  foot paresthesias: Yes  foot ulcerations: No  polydipsia: No  polyphagia: No  polyuria: Yes  visual change: No  weakness: No  weight loss: No  Symptom course: worsening  confusion: No  dizziness: No  headaches: No  hunger: No  mood changes: No  nervous/anxious: No  pallor: No  seizures: No  sleepiness: No  speech difficulty: No  sweats: No  tremors: No  blackouts: No  hospitalization: No  nocturnal hypoglycemia: No  required assistance: No  required glucagon: No  CVA: No  heart disease: No  impotence: No  nephropathy: No  peripheral neuropathy: Yes  PVD: Yes  retinopathy: No  CAD risks: family history  Current treatments: diet  Treatment compliance: most of the time  Blood glucose trend: fluctuating minimally  Weight trend: stable  Meal planning: carbohydrate counting  Exercise: intermittently  Dietitian visit: No  Eye exam current: Yes  Sees podiatrist: Yes  Chief Complaint  Diabetes (lab f/u )    Subjective          Niraj Hassan presents to [facility] for   Diabetes  He has type 2 diabetes mellitus. No MedicAlert identification noted. The initial diagnosis of diabetes was made 2 years ago. Pertinent negatives for hypoglycemia include no confusion, dizziness, headaches, hunger, mood changes, nervousness/anxiousness, pallor, seizures, sleepiness, speech difficulty, sweats or tremors. Associated symptoms include foot paresthesias and polyuria. Pertinent negatives for diabetes include no blurred vision, no chest pain, no fatigue, no foot ulcerations, no polydipsia, no polyphagia, no visual change, no weakness and no weight loss. Pertinent negatives for hypoglycemia complications include no blackouts, no hospitalization, no nocturnal  hypoglycemia, no required assistance and no required glucagon injection. Symptoms are worsening. Diabetic complications include peripheral neuropathy and PVD. Pertinent negatives for diabetic complications include no CVA, heart disease, impotence, nephropathy or retinopathy. Risk factors for coronary artery disease include family history. Current diabetic treatment includes diet. He is compliant with treatment most of the time. His weight is stable. Meal planning includes carbohydrate counting. He has not had a previous visit with a dietitian. He participates in exercise intermittently. His home blood glucose trend is fluctuating minimally. He sees a podiatrist.Eye exam is current.      Patient is here today to follow-up on type 2 diabetes.  We had repeated his A1c for routine follow-up and it had increased from 7.8-8.22.  Patient has been taking and tolerating his Metformin twice per day routinely.  He does admit that he has had chronic diarrhea, he thinks this is from his diet and not from his Metformin but after discussing it is not quite sure.    He follows a pretty low carb diet.  He typically eats mainly one big meal at dinner.  He reports he just does not have much appetite.  He does not cook for himself and mainly eats frozen foods.  In the summer sometimes he will add a solid but knows he does not get and recommended amount of fruits or vegetables.  He occasionally has a sweet but not often.    Right foot pain: The gabapentin helps some but he feels convinced that it is due to his back.  He had EMG done by podiatry and this appeared to be a problem in his sacral spine.  He reports he had a cyst in his sacral spine that was removed a long time ago.  He does not remember any post op problems.  Never had any follow-up since.  He has been seeing vascular who does not think he has current blockage causing the foot pain.  The pain mainly occurs at night when he is lying down which makes him think it is coming  "from his spine.  He does not have any back pain.  He has no radicular symptoms other than in his foot.  He sometimes feels some pain along his lateral leg just above his ankle but this does not occur all the time.    Diabetic foot exam was done at podiatry this past year.    Nicotine dependence: He continues to smoke about a half a pack per day.  He has been gradually trying to decrease.  He has tried nicotine patches in the past and did not feel like they worked as he continued to smoke and had to take them off in order to smoke.  He has some lozenges at home but does not use them.    His blood pressure is high today.  He thinks this is due to anxiety.  He checks it at home and it has been running at goal around 120/70 or less.    Patient denies any chest pain or heart palpitations, syncope, headaches, vision changes recently.  He denies dyspnea or cough.  Patient denies nausea, vomiting, constipation, blood in his stool.  He does admit chronic diarrhea several times per day.  He is not sure if this is due to coffee consumption or diet.  He does admit that this was improved when he was not eating so many frozen meals.  Patient denies polyuria or polydipsia.  He has neuropathic symptoms as above.  He denies anxiety or depression but does admit his sleep is affected by his neuropathic pain in his foot.  He denies difficulty swallowing or worsening acid reflux, sore throat.  He denies fatigue or unexpected weight changes    Objective   Vital Signs:   /89   Pulse 87   Temp 97.1 °F (36.2 °C) (Temporal)   Ht 172.7 cm (67.99\")   Wt 70 kg (154 lb 4.8 oz)   SpO2 98%   BMI 23.47 kg/m²     Physical Exam  Vitals signs and nursing note reviewed.   Constitutional:       General: He is not in acute distress.     Appearance: He is well-developed. He is not ill-appearing or diaphoretic.   HENT:      Head: Normocephalic and atraumatic.   Eyes:      General:         Right eye: No discharge.         Left eye: No discharge. "      Conjunctiva/sclera: Conjunctivae normal.   Cardiovascular:      Rate and Rhythm: Normal rate and regular rhythm.   Pulmonary:      Effort: Pulmonary effort is normal.      Breath sounds: Normal breath sounds.   Abdominal:      General: Bowel sounds are normal.      Palpations: Abdomen is soft.      Tenderness: There is no abdominal tenderness.   Musculoskeletal:         General: No deformity.      Comments: Gait smooth and steady   Skin:     General: Skin is warm and dry.   Neurological:      General: No focal deficit present.      Mental Status: He is alert and oriented to person, place, and time.   Psychiatric:         Mood and Affect: Mood normal.         Behavior: Behavior normal.        Result Review :                 Assessment and Plan    Problem List Items Addressed This Visit        Endocrine and Metabolic    Type 2 diabetes mellitus with diabetic neuropathy, without long-term current use of insulin (CMS/[facility])    Relevant Medications    glipizide (Glucotrol) 5 MG tablet      Other Visit Diagnoses     Neuropathic pain    -  Primary    Chronic diarrhea        Cigarette nicotine dependence without complication        Relevant Medications    nicotine (NICODERM CQ) 21 MG/24HR patch    Other Relevant Orders    XR Spine Lumbar 4+ View        Type 2 diabetes: For now we will continue Metformin twice per day.  I am going to add glipizide at dinnertime.  He will take it about 15 minutes prior to his meal.  If he does not eat he will not take it.  We discussed side effects and death.  He was cautioned about low blood sugars in the evening and recommend to have bedtime snack as well as some carbohydrates such as peanut butter or juice to get his blood sugar up if it drops too low.  If he has any problems he will let me know.  If he is tolerating this well we can consider dropping back on his Metformin to a total of a gram per day to see if this improves his diarrhea.    Neuropathic pain: We will get an x-ray of his  spine.  I do not see any imaging of his back.  If abnormal then most likely will need an MRI.  We discussed tweaking his gabapentin which he is taking 3 times a day to once during the day and taking 2 at at bedtime to see if this helps.  He is on a pretty low dose and we could consider increasing his nighttime dose since that is mostly when he is experiencing the pain.  He will let me know if rearranging the doses is helpful.  His Gab has been appropriate and he has an up-to-date controlled substance agreement.  He had an EMG done at podiatry and I would like to obtain this.    Nicotine dependence: Given his long history of vascular problems we discussed the importance this year of really focusing on stopping tobacco.  I think his patches did not work in the past because he was removing them to smoke.  Is likely underdosed.  I will resend patches.  I have cautioned him not to remove the patch if he is craving a cigarette.  Recommend periodic use of gum or lozenge at regular intervals to see if this can reduce his cravings.  Patient will let me know how this is going for him in a couple weeks.  He will let me know sooner if he has any problems.    His blood pressure was up today and I have asked him to check it at home in the next couple weeks and let me know how it is running.  It is important with his history of a small aneurysm and his vascular history to make sure we keep his blood pressure ideally close to 120/70 without any hypo- tension.          Follow Up   Return in about 3 months (around 4/26/2021).  Patient was given instructions and counseling regarding his condition or for health maintenance advice. Please see specific information pulled into the AVS if appropriate.

## 2021-01-28 DIAGNOSIS — G89.29 CHRONIC BILATERAL LOW BACK PAIN WITHOUT SCIATICA: ICD-10-CM

## 2021-01-28 DIAGNOSIS — M79.2 NEUROPATHIC PAIN: Primary | ICD-10-CM

## 2021-01-28 DIAGNOSIS — M54.50 CHRONIC BILATERAL LOW BACK PAIN WITHOUT SCIATICA: ICD-10-CM

## 2021-01-28 RX ORDER — METOPROLOL SUCCINATE 50 MG/1
TABLET, EXTENDED RELEASE ORAL
Qty: 90 TABLET | Refills: 1 | Status: SHIPPED | OUTPATIENT
Start: 2021-01-28 | End: 2021-06-01 | Stop reason: SDUPTHER

## 2021-02-08 NOTE — PROGRESS NOTES
"Subjective   History of Present Illness: Niraj Hassan is a 75 y.o. male is being seen for consultation today at the request of AGATHA Umaña for neuropathic pain and chronic bilateral low back pain without sciatica. Patient had Spine Lumbar XR on 1/26/21.     Today, the patient reports not having any back pain. Patient reports getting an XR. Patient is here for results.        Back Pain  Laying down flat causes more pain in back, sitting for long periods, and standing for long periods, or walking.   States that back gets more \"tired\" than painful and will have to rest after sweeping. History of right sided sciatica 30 years ago, and left sided 2-3 years ago.          Leg Pain  Calves are hurting, and the top of right foot, states that he can feel pain in his foot with pressing on certain parts of his leg.  States that he has pain on the inside of right foot that feels like an \"ice pick\" and burning.  States that he just wants a good nights sleep.  Can only lay on right side and cannot lay on left side r/t pain in right foot, with pressure from laying on the right.      He states that he has tried Neurontin, which is currently prescribed by his primary care physician at 300 mg 3 times daily and has recently ran out.  He states that he also has tried ibuprofen, in the past, but believe twice a day works better.  He has also tried using clinical Gesic lotion which works to a certain extent.  In addition, he has had a work-up for a nerve conduction study related to his numbness in his foot, as his PCP thought possibly this was related to diabetic neuropathy.  The physician that did the nerve conduction study states that this is definitely related to his spine and not neuropathy.  Physician who did the nerve conduction study states that there was an injury to his L5-S1 and he states the only time he injured that area was sledding, as a kid.    States that he was unable to move legs after that injury and had " "to lay for 5 minutes before he could move.     History of surgery for cyst removal at the base of his spine over 30 years ago.       The following portions of the patient's history were reviewed and updated as appropriate: allergies, current medications, past family history, past medical history, past social history, past surgical history and problem list.    Review of Systems   Respiratory: Negative for chest tightness and shortness of breath.    Cardiovascular: Negative for chest pain.   Gastrointestinal: Negative for vomiting.   Genitourinary: Negative for difficulty urinating.   Musculoskeletal: Positive for back pain.        Right now, no pain   Neurological: Positive for numbness. Negative for weakness.   Psychiatric/Behavioral: Negative for sleep disturbance.   All other systems reviewed and are negative.          Objective     Vitals:    02/15/21 1239   BP: 157/84   BP Location: Right arm   Patient Position: Sitting   Cuff Size: Comment: long adult   Pulse: 83   Temp: 97.1 °F (36.2 °C)   TempSrc: Temporal   Weight: 68 kg (150 lb)   Height: 172.7 cm (67.99\")     Body mass index is 22.81 kg/m².      Physical Exam  Vitals signs and nursing note reviewed.   Constitutional:       Appearance: Normal appearance. He is well-developed and normal weight.   HENT:      Head: Normocephalic.      Mouth/Throat:      Mouth: Mucous membranes are moist.   Eyes:      Pupils: Pupils are equal, round, and reactive to light.   Neck:      Musculoskeletal: Normal range of motion.   Cardiovascular:      Rate and Rhythm: Normal rate.      Pulses: Normal pulses.   Pulmonary:      Effort: Pulmonary effort is normal.   Abdominal:      General: Abdomen is flat.   Skin:     General: Skin is warm and dry.   Neurological:      General: No focal deficit present.      Mental Status: He is alert and oriented to person, place, and time.      GCS: GCS eye subscore is 4. GCS verbal subscore is 5. GCS motor subscore is 6.      Coordination: " Coordination is intact.      Gait: Gait is intact.      Deep Tendon Reflexes:      Reflex Scores:       Patellar reflexes are 0 on the right side and 1+ on the left side.       Achilles reflexes are 0 on the right side and 1+ on the left side.  Psychiatric:         Attention and Perception: Attention and perception normal.         Mood and Affect: Mood and affect normal.         Speech: Speech normal.         Behavior: Behavior normal. Behavior is cooperative.         Thought Content: Thought content normal.         Cognition and Memory: Cognition normal.         Judgment: Judgment normal.       Neurologic Exam     Mental Status   Oriented to person, place, and time.   Attention: normal. Concentration: normal.   Speech: speech is normal     Cranial Nerves     CN III, IV, VI   Pupils are equal, round, and reactive to light.    Motor Exam   Muscle bulk: normal  Overall muscle tone: normal  Right arm tone: normal  Left arm tone: normal  Right leg tone: normal  Left leg tone: normal    Strength   Right quadriceps: 5/5  Left quadriceps: 5/5  Right hamstrin/5  Left hamstrin/5  Right glutei: 5/5  Left glutei: 5/5  Right anterior tibial: 4/5  Left anterior tibial: 5/5  Right posterior tibial: 5/5  Left posterior tibial: 5/5  Right gastroc: 5/5  Left gastroc: 5/5    Sensory Exam   Left leg light touch: decreased from knee (States that he has numbness on the outside of his right calf down to his foot)    Gait, Coordination, and Reflexes     Gait  Gait: normal    Tremor   Resting tremor: absent  Intention tremor: absent  Action tremor: absent    Reflexes   Right patellar: 0  Left patellar: 1+  Right achilles: 0  Left achilles: 1+  Right plantar: normal  Left plantar: normal  Right ankle clonus: absent  Left ankle clonus: absent  Right pendular knee jerk: absent  Left pendular knee jerk: absentSLR -  Valdemar's -  Able to toe walk, not able to heel walk in the office, able to squat down on his left tiptoes and get back up,  and able to get up from squatting down and standing on his right toes.           Assessment/Plan   Independent Review of Radiographic Studies:      I personally reviewed the images from from his spine x-rays, which show some dense degenerative disc changes throughout the lumbar spine without subluxation, or compression deformity.  The SI joint appears normal, no scoliosis is noted, infra renal abdominal aortic aneurysm is also visualized.    Medical Decision Making:      Discussed an MRI to further look at his lumbar in relation to the foot and back pain, as the previous MRI that he has is only an abdominal imaging.  I will send for an MRI of the lumbar spine, to see if there is any further nerve compression, or other deformity that might be shown.  Have him to return to the office in 2 weeks to discuss treatment.  I have explained epidural injections, and possibly PT as the next step in treatment.  In addition, I have asked him to speak with his PCP about refilling his Neurontin, as this may help with the burning pain in his foot.    Diagnoses and all orders for this visit:    1. Chronic back pain, unspecified back location, unspecified back pain laterality (Primary)  -     MRI Lumbar Spine Without Contrast; Future    2. Numbness of right foot  -     MRI Lumbar Spine Without Contrast; Future    3. Sciatica of right side  -     MRI Lumbar Spine Without Contrast; Future      Return in about 2 weeks (around 3/1/2021) for with new MRI.

## 2021-02-15 ENCOUNTER — OFFICE VISIT (OUTPATIENT)
Dept: NEUROSURGERY | Facility: CLINIC | Age: 76
End: 2021-02-15

## 2021-02-15 VITALS
BODY MASS INDEX: 22.73 KG/M2 | WEIGHT: 150 LBS | SYSTOLIC BLOOD PRESSURE: 157 MMHG | HEIGHT: 68 IN | HEART RATE: 83 BPM | DIASTOLIC BLOOD PRESSURE: 84 MMHG | TEMPERATURE: 97.1 F

## 2021-02-15 DIAGNOSIS — M54.31 SCIATICA OF RIGHT SIDE: ICD-10-CM

## 2021-02-15 DIAGNOSIS — G89.29 CHRONIC BACK PAIN, UNSPECIFIED BACK LOCATION, UNSPECIFIED BACK PAIN LATERALITY: Primary | ICD-10-CM

## 2021-02-15 DIAGNOSIS — E78.5 HYPERLIPIDEMIA ASSOCIATED WITH TYPE 2 DIABETES MELLITUS (HCC): ICD-10-CM

## 2021-02-15 DIAGNOSIS — M54.9 CHRONIC BACK PAIN, UNSPECIFIED BACK LOCATION, UNSPECIFIED BACK PAIN LATERALITY: Primary | ICD-10-CM

## 2021-02-15 DIAGNOSIS — E78.1 HIGH TRIGLYCERIDES: ICD-10-CM

## 2021-02-15 DIAGNOSIS — R20.0 NUMBNESS OF RIGHT FOOT: ICD-10-CM

## 2021-02-15 DIAGNOSIS — E11.69 HYPERLIPIDEMIA ASSOCIATED WITH TYPE 2 DIABETES MELLITUS (HCC): ICD-10-CM

## 2021-02-15 PROCEDURE — 99204 OFFICE O/P NEW MOD 45 MIN: CPT | Performed by: NURSE PRACTITIONER

## 2021-02-16 ENCOUNTER — TELEPHONE (OUTPATIENT)
Dept: CARDIOLOGY | Facility: CLINIC | Age: 76
End: 2021-02-16

## 2021-02-16 DIAGNOSIS — G62.9 NEUROPATHY: ICD-10-CM

## 2021-02-16 RX ORDER — ATORVASTATIN CALCIUM 40 MG/1
TABLET, FILM COATED ORAL
Qty: 90 TABLET | Refills: 1 | Status: SHIPPED | OUTPATIENT
Start: 2021-02-16 | End: 2021-05-24 | Stop reason: SDUPTHER

## 2021-02-16 RX ORDER — GABAPENTIN 300 MG/1
300 CAPSULE ORAL 3 TIMES DAILY
Qty: 90 CAPSULE | Refills: 2 | Status: SHIPPED | OUTPATIENT
Start: 2021-02-16 | End: 2021-04-05

## 2021-02-16 NOTE — TELEPHONE ENCOUNTER
Pt called. He is needing surgery cardiac clearance for vascular surgery with Dr. Dharmesh Collazo not scheduled yet. 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    We saw him last on 9/3/20, but for vascular. And you referred him to Dr. Collazo. He was originally a Dr. Swenson pt for his heart and was last seen for that 1/10/19. So do you want to see him before clearing him since we saw him for vascular and not his heart?  Please advise.    Thanks,  Liya

## 2021-02-17 DIAGNOSIS — I25.119 CORONARY ARTERY DISEASE INVOLVING NATIVE CORONARY ARTERY OF NATIVE HEART WITH ANGINA PECTORIS (HCC): Primary | ICD-10-CM

## 2021-02-17 NOTE — TELEPHONE ENCOUNTER
Pt advised of recommendations and verbalized understanding.    Shantell,  Please call pt and get him scheduled for a Stress test.    Thanks,  MACO Brownlee

## 2021-02-18 ENCOUNTER — TRANSCRIBE ORDERS (OUTPATIENT)
Dept: SLEEP MEDICINE | Facility: HOSPITAL | Age: 76
End: 2021-02-18

## 2021-02-18 DIAGNOSIS — Z01.818 OTHER SPECIFIED PRE-OPERATIVE EXAMINATION: Primary | ICD-10-CM

## 2021-02-25 ENCOUNTER — TELEPHONE (OUTPATIENT)
Dept: NEUROSURGERY | Facility: CLINIC | Age: 76
End: 2021-02-25

## 2021-02-25 NOTE — TELEPHONE ENCOUNTER
Caller: PT    Relationship to patient: SELF    Best call back number: [phone]    Chief complaint:     Type of visit: FOLLOW UP EXTENDED    Requested date: ANY DATE AFTER 03/08/21     If rescheduling, when is the original appointment:      Additional notes:PT RECEIVED A CALL TO SCHEDULE AN APPT AFTER HIS MRI HOWEVER VAMSHI RED DOES NOT HAVE ANYTHING IN HER SCHEDULE FOR ME TO SCHEDULE IT.    PLEASE ADVISE  THANK YOU

## 2021-02-27 ENCOUNTER — LAB (OUTPATIENT)
Dept: LAB | Facility: HOSPITAL | Age: 76
End: 2021-02-27

## 2021-02-27 DIAGNOSIS — Z01.818 OTHER SPECIFIED PRE-OPERATIVE EXAMINATION: ICD-10-CM

## 2021-02-27 PROCEDURE — U0004 COV-19 TEST NON-CDC HGH THRU: HCPCS

## 2021-02-27 PROCEDURE — C9803 HOPD COVID-19 SPEC COLLECT: HCPCS

## 2021-03-01 LAB — SARS-COV-2 RNA RESP QL NAA+PROBE: NOT DETECTED

## 2021-03-01 NOTE — PROGRESS NOTES
"Subjective   History of Present Illness: Niraj Hassan is a 75 y.o. male is here today for follow-up. Patient was last seen in the office on 2/15/21 with AGATHA Laureano for back pain. Patient was referred to get an MRI.     Patient had MRI on 3/8/21.     Today, the patient reports not really having any pain. Patient states the only pain he has is in his right foot. Patient reports numbness and tingling and weakness in his right foot and feels this is sciatic nerve related.  In addition, he states that he can press on different spots of his legs and can feel this in his foot and states that he has ome tenderness to his shin and pressure to the back of his leg causes pain, occasionally burning in his little toe of his right foot.  States that he feels that he has equal strength and only has trouble walking up stairs r/t the numbness in right foot and sometimes causes his \"nerves to jump.\"  When he lays flat he feels numbness on the inside of his left foot, and when changes position he has numbness on the top of his left foot.  Has had numbness for years on the outside of his right thigh.     States that the he takes 300 mg of gabapentin 3 times daily for the burning in his right lower extremity, and this is not helping.  He has been taking this medication for several months.  He states that he has no back pain presently.    Blockage and graft done in October was blocked by January and is having a re-graft of blocked graft by Dr. Savage on Friday.      Patient is wearing a mask in office visit today.       The following portions of the patient's history were reviewed and updated as appropriate: allergies, current medications, past family history, past medical history, past social history, past surgical history and problem list.    Review of Systems   Constitutional: Positive for activity change.   Respiratory: Negative for chest tightness and shortness of breath.    Cardiovascular: Negative for chest pain. " "  Genitourinary: Negative for difficulty urinating.   Musculoskeletal: Positive for back pain, gait problem and myalgias.        Right foot pain   Neurological: Positive for weakness and numbness.        Tingling ;; all in his right foot   Psychiatric/Behavioral: Positive for sleep disturbance.   All other systems reviewed and are negative.          Objective     Vitals:    03/10/21 1349   BP: 122/80   BP Location: Left arm   Patient Position: Sitting   Pulse: 80   Temp: 98.3 °F (36.8 °C)   TempSrc: Temporal   Weight: 70.3 kg (155 lb)   Height: 172.7 cm (68\")     Body mass index is 23.57 kg/m².      Physical Exam  Vitals and nursing note reviewed.   Constitutional:       Appearance: Normal appearance. He is well-developed and well-groomed.   HENT:      Head: Normocephalic and atraumatic.      Mouth/Throat:      Mouth: Mucous membranes are moist.   Cardiovascular:      Rate and Rhythm: Normal rate.      Pulses: Normal pulses.   Pulmonary:      Effort: Pulmonary effort is normal.   Abdominal:      General: Abdomen is flat.   Musculoskeletal:      Cervical back: Normal range of motion.      Right hip: Normal.      Left hip: Normal.      Right upper leg: Normal.      Left upper leg: Normal.      Right lower leg: Normal.      Left lower leg: Normal.      Right foot: Normal.      Left foot: Normal.   Skin:     General: Skin is warm and dry.   Neurological:      Mental Status: He is alert and oriented to person, place, and time.      GCS: GCS eye subscore is 4. GCS verbal subscore is 5. GCS motor subscore is 6.      Motor: Motor function is intact.      Coordination: Coordination is intact.      Gait: Gait is intact. Tandem walk normal.      Deep Tendon Reflexes: Strength normal.   Psychiatric:         Attention and Perception: Attention and perception normal.         Mood and Affect: Mood normal.         Speech: Speech normal.         Behavior: Behavior normal. Behavior is cooperative.         Thought Content: Thought " content normal.         Cognition and Memory: Cognition normal.         Judgment: Judgment normal.       Neurologic Exam     Mental Status   Oriented to person, place, and time.   Attention: normal. Concentration: normal.   Speech: speech is normal     Cranial Nerves     CN II   Visual fields full to confrontation.     Motor Exam   Muscle bulk: normal  Right leg tone: normal  Left leg tone: normal    Strength   Strength 5/5 throughout.     Sensory Exam   Right leg light touch: decreased from toes    Gait, Coordination, and Reflexes     Gait  Gait: normal    Coordination   Tandem walking coordination: normal    Tremor   Resting tremor: absent  Action tremor: absentOutside of right thigh has chronic numbness           Assessment/Plan   Independent Review of Radiographic Studies:      I personally reviewed the images of the MRI lumbar spine and the report reveals there in a gentleman retrolisthesis of L1 on 2 by approximately 3 mm.  Mild central disc protrusion extends inferiorly from the level of L1-L2 else and a mild degree of narrowing of the left aspect of the lumbar spinal canal.  Mild to moderate bilateral foraminal narrowing secondary to bulging disc material loss of foraminal height.  There are degenerative changes at the level of L1-L2.  Mild to moderate degree of central canal stenosis at L3-4 and L4-5 secondary to disc bulging, ligamentum flavum thickening, and facet arthropathy.  Multilevel foraminal stenosis is most prominently identified within the left L3-4 and right L4-5 neural foraminal where there is a moderate degree of foraminal stenosis.       Medical Decision Making:      Will send for epidural injection r/t he is not wanting any PT, at this time, states that PT did not work in the past.  He will return to the office in 6 weeks, after NICOLE to see if he is feeling any better.  I explained that he can cancel this appointment, if he is feeling better.  He states if the epidural injections do not work  for him, he may want to discuss the possibility of surgery.  He is currently on Plavix and I have informed him that he will need to check with his cardiologist to see if it is ok to stop his Plavix for 7 days in order to receive the NICOLE, he understands and states that he has an appointment with Dr. Manriquez today.  Incidental finding of abdominal aortic aneurysm measuring 3.4 x 3.3 cm in greatest axial dimensions,he states that he is previously aware of this finding and cardiology and vascular are following.    Diagnoses and all orders for this visit:    1. Right leg numbness (Primary)  -     Epidural Block    2. Numbness of right foot  -     Epidural Block      Return in about 6 weeks (around 4/21/2021) for after injections.

## 2021-03-02 ENCOUNTER — HOSPITAL ENCOUNTER (OUTPATIENT)
Dept: CARDIOLOGY | Facility: HOSPITAL | Age: 76
Discharge: HOME | End: 2021-03-02
Admitting: INTERNAL MEDICINE

## 2021-03-02 VITALS — BODY MASS INDEX: 23.49 KG/M2 | WEIGHT: 155 LBS | HEIGHT: 68 IN

## 2021-03-02 DIAGNOSIS — Z23 IMMUNIZATION DUE: ICD-10-CM

## 2021-03-02 DIAGNOSIS — I25.119 CORONARY ARTERY DISEASE INVOLVING NATIVE CORONARY ARTERY OF NATIVE HEART WITH ANGINA PECTORIS (HCC): ICD-10-CM

## 2021-03-02 LAB
BH CV NUCLEAR PRIOR STUDY: 3
BH CV STRESS BP STAGE 1: NORMAL
BH CV STRESS COMMENTS STAGE 1: NORMAL
BH CV STRESS DOSE REGADENOSON STAGE 1: 0.4
BH CV STRESS DURATION MIN STAGE 1: 0
BH CV STRESS DURATION SEC STAGE 1: 10
BH CV STRESS HR STAGE 1: 98
BH CV STRESS PROTOCOL 1: NORMAL
BH CV STRESS RECOVERY BP: NORMAL MMHG
BH CV STRESS RECOVERY HR: 94 BPM
BH CV STRESS STAGE 1: 1
LV EF NUC BP: 40 %
MAXIMAL PREDICTED HEART RATE: 146 BPM
PERCENT MAX PREDICTED HR: 67.12 %
STRESS BASELINE BP: NORMAL MMHG
STRESS BASELINE HR: 78 BPM
STRESS PERCENT HR: 79 %
STRESS POST EXERCISE DUR SEC: 10 SEC
STRESS POST PEAK BP: NORMAL MMHG
STRESS POST PEAK HR: 98 BPM
STRESS TARGET HR: 124 BPM

## 2021-03-02 PROCEDURE — A9502 TC99M TETROFOSMIN: HCPCS | Performed by: INTERNAL MEDICINE

## 2021-03-02 PROCEDURE — 93016 CV STRESS TEST SUPVJ ONLY: CPT | Performed by: INTERNAL MEDICINE

## 2021-03-02 PROCEDURE — 93017 CV STRESS TEST TRACING ONLY: CPT

## 2021-03-02 PROCEDURE — 78452 HT MUSCLE IMAGE SPECT MULT: CPT | Performed by: INTERNAL MEDICINE

## 2021-03-02 PROCEDURE — 93018 CV STRESS TEST I&R ONLY: CPT | Performed by: INTERNAL MEDICINE

## 2021-03-02 PROCEDURE — 0 TECHNETIUM TETROFOSMIN KIT: Performed by: INTERNAL MEDICINE

## 2021-03-02 PROCEDURE — 78452 HT MUSCLE IMAGE SPECT MULT: CPT

## 2021-03-02 PROCEDURE — 25010000002 REGADENOSON 0.4 MG/5ML SOLUTION: Performed by: INTERNAL MEDICINE

## 2021-03-02 RX ADMIN — REGADENOSON 0.4 MG: 0.08 INJECTION, SOLUTION INTRAVENOUS at 10:00

## 2021-03-02 RX ADMIN — TETROFOSMIN 1 DOSE: 1.38 INJECTION, POWDER, LYOPHILIZED, FOR SOLUTION INTRAVENOUS at 09:05

## 2021-03-02 RX ADMIN — TETROFOSMIN 1 DOSE: 1.38 INJECTION, POWDER, LYOPHILIZED, FOR SOLUTION INTRAVENOUS at 10:00

## 2021-03-02 NOTE — PROGRESS NOTES
Dharmesh,   I think his cardiac risk is intermediate.  Stress test looks stable with lateral wall infarction and no ischemia.    Thanks

## 2021-03-08 ENCOUNTER — HOSPITAL ENCOUNTER (OUTPATIENT)
Dept: MRI IMAGING | Facility: HOSPITAL | Age: 76
Discharge: HOME | End: 2021-03-08
Admitting: NURSE PRACTITIONER

## 2021-03-08 DIAGNOSIS — M54.31 SCIATICA OF RIGHT SIDE: ICD-10-CM

## 2021-03-08 DIAGNOSIS — R20.0 NUMBNESS OF RIGHT FOOT: ICD-10-CM

## 2021-03-08 DIAGNOSIS — M54.9 CHRONIC BACK PAIN, UNSPECIFIED BACK LOCATION, UNSPECIFIED BACK PAIN LATERALITY: ICD-10-CM

## 2021-03-08 DIAGNOSIS — G89.29 CHRONIC BACK PAIN, UNSPECIFIED BACK LOCATION, UNSPECIFIED BACK PAIN LATERALITY: ICD-10-CM

## 2021-03-08 PROCEDURE — 72148 MRI LUMBAR SPINE W/O DYE: CPT

## 2021-03-10 ENCOUNTER — OFFICE VISIT (OUTPATIENT)
Dept: NEUROSURGERY | Facility: CLINIC | Age: 76
End: 2021-03-10

## 2021-03-10 ENCOUNTER — APPOINTMENT (OUTPATIENT)
Dept: PREADMISSION TESTING | Facility: HOSPITAL | Age: 76
End: 2021-03-10

## 2021-03-10 VITALS
HEIGHT: 68 IN | HEART RATE: 80 BPM | BODY MASS INDEX: 23.49 KG/M2 | SYSTOLIC BLOOD PRESSURE: 122 MMHG | WEIGHT: 155 LBS | DIASTOLIC BLOOD PRESSURE: 80 MMHG | TEMPERATURE: 98.3 F

## 2021-03-10 VITALS
OXYGEN SATURATION: 98 % | WEIGHT: 154 LBS | RESPIRATION RATE: 16 BRPM | BODY MASS INDEX: 23.34 KG/M2 | SYSTOLIC BLOOD PRESSURE: 164 MMHG | HEART RATE: 88 BPM | TEMPERATURE: 98.4 F | HEIGHT: 68 IN | DIASTOLIC BLOOD PRESSURE: 88 MMHG

## 2021-03-10 DIAGNOSIS — R20.0 RIGHT LEG NUMBNESS: Primary | ICD-10-CM

## 2021-03-10 DIAGNOSIS — R20.0 NUMBNESS OF RIGHT FOOT: ICD-10-CM

## 2021-03-10 LAB
ANION GAP SERPL CALCULATED.3IONS-SCNC: 11.5 MMOL/L (ref 5–15)
BUN SERPL-MCNC: 15 MG/DL (ref 8–23)
BUN/CREAT SERPL: 18.5 (ref 7–25)
CALCIUM SPEC-SCNC: 9.3 MG/DL (ref 8.6–10.5)
CHLORIDE SERPL-SCNC: 104 MMOL/L (ref 98–107)
CO2 SERPL-SCNC: 23.5 MMOL/L (ref 22–29)
CREAT SERPL-MCNC: 0.81 MG/DL (ref 0.76–1.27)
DEPRECATED RDW RBC AUTO: 44.1 FL (ref 37–54)
ERYTHROCYTE [DISTWIDTH] IN BLOOD BY AUTOMATED COUNT: 12.3 % (ref 12.3–15.4)
GFR SERPL CREATININE-BSD FRML MDRD: 93 ML/MIN/1.73
GLUCOSE SERPL-MCNC: 337 MG/DL (ref 65–99)
HCT VFR BLD AUTO: 40.4 % (ref 37.5–51)
HGB BLD-MCNC: 13.6 G/DL (ref 13–17.7)
MCH RBC QN AUTO: 32.7 PG (ref 26.6–33)
MCHC RBC AUTO-ENTMCNC: 33.7 G/DL (ref 31.5–35.7)
MCV RBC AUTO: 97.1 FL (ref 79–97)
PLATELET # BLD AUTO: 192 10*3/MM3 (ref 140–450)
PMV BLD AUTO: 11 FL (ref 6–12)
POTASSIUM SERPL-SCNC: 4.8 MMOL/L (ref 3.5–5.2)
QT INTERVAL: 403 MS
RBC # BLD AUTO: 4.16 10*6/MM3 (ref 4.14–5.8)
SODIUM SERPL-SCNC: 139 MMOL/L (ref 136–145)
WBC # BLD AUTO: 8.45 10*3/MM3 (ref 3.4–10.8)

## 2021-03-10 PROCEDURE — 99213 OFFICE O/P EST LOW 20 MIN: CPT | Performed by: NURSE PRACTITIONER

## 2021-03-10 PROCEDURE — 85027 COMPLETE CBC AUTOMATED: CPT

## 2021-03-10 PROCEDURE — 36415 COLL VENOUS BLD VENIPUNCTURE: CPT

## 2021-03-10 PROCEDURE — 80048 BASIC METABOLIC PNL TOTAL CA: CPT

## 2021-03-10 PROCEDURE — U0004 COV-19 TEST NON-CDC HGH THRU: HCPCS | Performed by: NURSE PRACTITIONER

## 2021-03-10 PROCEDURE — 93005 ELECTROCARDIOGRAM TRACING: CPT

## 2021-03-10 PROCEDURE — C9803 HOPD COVID-19 SPEC COLLECT: HCPCS | Performed by: NURSE PRACTITIONER

## 2021-03-10 PROCEDURE — 93010 ELECTROCARDIOGRAM REPORT: CPT | Performed by: INTERNAL MEDICINE

## 2021-03-10 NOTE — DISCHARGE INSTRUCTIONS
PLEASE ARRIVE AT 10:30AM ON 3/12/2021    Take the following medications the morning of surgery:  GABAPENTIN, PREVACID, METOPROLOL    If you are on prescription narcotic pain medication to control your pain you may also take that medication the morning of surgery.    General Instructions:  • Do not eat solid food after midnight the night before surgery.  • You may drink clear liquids day of surgery but must stop at least one hour before your hospital arrival time(9:30AM).  • It is beneficial for you to have a clear drink that contains carbohydrates the day of surgery.  We suggest a 12 to 20 ounce bottle of Gatorade or Powerade for non-diabetic patients or a 12 to 20 ounce bottle of G2 or Powerade Zero for diabetic patients. (Pediatric patients, are not advised to drink a 12 to 20 ounce carbohydrate drink)    Clear liquids are liquids you can see through.  Nothing red in color.     Plain water                               Sports drinks  Sodas                                   Gelatin (Jell-O)  Fruit juices without pulp such as white grape juice and apple juice  Popsicles that contain no fruit or yogurt  Tea or coffee (no cream or milk added)  Gatorade / Powerade  G2 / Powerade Zero    • Infants may have breast milk up to four hours before surgery.  • Infants drinking formula may drink formula up to six hours before surgery.   • Patients who avoid smoking, chewing tobacco and alcohol for 4 weeks prior to surgery have a reduced risk of post-operative complications.  Quit smoking as many days before surgery as you can.  • Do not smoke, use chewing tobacco or drink alcohol the day of surgery.   • If applicable bring your C-PAP/ BI-PAP machine.  • Bring any papers given to you in the doctor’s office.  • Wear clean comfortable clothes.  • Do not wear contact lenses, false eyelashes or make-up.  Bring a case for your glasses.   • Bring crutches or walker if applicable.  • Remove all piercings.  Leave jewelry and any other  valuables at home.  • Hair extensions with metal clips must be removed prior to surgery.  • The Pre-Admission Testing nurse will instruct you to bring medications if unable to obtain an accurate list in Pre-Admission Testing.        If you were given a blood bank ID arm band remember to bring it with you the day of surgery.    Preventing a Surgical Site Infection:  • For 2 to 3 days before surgery, avoid shaving with a razor because the razor can irritate skin and make it easier to develop an infection.    • Any areas of open skin can increase the risk of a post-operative wound infection by allowing bacteria to enter and travel throughout the body.  Notify your surgeon if you have any skin wounds / rashes even if it is not near the expected surgical site.  The area will need assessed to determine if surgery should be delayed until it is healed.  • The night prior to surgery shower using a fresh bar of anti-bacterial soap (such as Dial) and clean washcloth.  Sleep in a clean bed with clean clothing.  Do not allow pets to sleep with you.  • Shower on the morning of surgery using a fresh bar of anti-bacterial soap (such as Dial) and clean washcloth.  Dry with a clean towel and dress in clean clothing.  • Ask your surgeon if you will be receiving antibiotics prior to surgery.  • Make sure you, your family, and all healthcare providers clean their hands with soap and water or an alcohol based hand  before caring for you or your wound.    Day of surgery:  Your arrival time is approximately two hours before your scheduled surgery time.  Upon arrival, a Pre-op nurse and Anesthesiologist will review your health history, obtain vital signs, and answer questions you may have.  The only belongings needed at this time will be a list of your home medications and if applicable your C-PAP/BI-PAP machine.  A Pre-op nurse will start an IV and you may receive medication in preparation for surgery, including something to help  you relax.     Please be aware that surgery does come with discomfort.  We want to make every effort to control your discomfort so please discuss any uncontrolled symptoms with your nurse.   Your doctor will most likely have prescribed pain medications.      If you are going home after surgery you will receive individualized written care instructions before being discharged.  A responsible adult must drive you to and from the hospital on the day of your surgery and stay with you for 24 hours.  Discharge prescriptions can be filled by the hospital pharmacy during regular pharmacy hours.  If you are having surgery late in the day/evening your prescription may be e-prescribed to your pharmacy.  Please verify your pharmacy hours or chose a 24 hour pharmacy to avoid not having access to your prescription because your pharmacy has closed for the day.    If you are staying overnight following surgery, you will be transported to your hospital room following the recovery period.  [facility].    If you have any questions please call Pre-Admission Testing at [phone].  Deductibles and co-payments are collected on the day of service. Please be prepared to pay the required co-pay, deductible or deposit on the day of service as defined by your plan.    Patient Education for Self-Quarantine Process    Following your COVID testing, we strongly recommend that you do not leave your home after you have been tested for COVID except to get medical care. This includes not going to work, school or to public areas.  If this is not possible for you to do please limit your activities to only required outings.  Be sure to wear a mask when you are with other people, practice social distancing and wash your hands frequently.      The following items provide additional details to keep you safe.  • Wash your hands with soap and water frequently for at least 20 seconds.   • Avoid touching your eyes, nose  and mouth with unwashed hands.  • Do not share anything - utensils, towels, food from the same bowl.   • Have your own utensils, drinking glass, dishes, towels and bedding.   • Do not have visitors.   • Do use FaceTime to stay in touch with family and friends.  • You should stay in a specific room away from others if possible.   • Stay at least 6 feet away from others in the home if you cannot have a dedicated room to yourself.   • Do not snuggle with your pet. While the CDC says there is no evidence that pets can spread COVID-19 or be infected from humans, it is probably best to avoid “petting, snuggling, being kissed or licked and sharing food (during self-quarantine)”, according to the CDC.   • Sanitize household surfaces daily. Include all high touch areas (door handles, light switches, phones, countertops, etc.)  • Do not share a bathroom with others, if possible.   • Wear a mask around others in your home if you are unable to stay in a separate room or 6 feet apart. If  you are unable to wear a mask, have your family member wear a mask if they must be within 6 feet of you.   Call your surgeon immediately if you experience any of the following symptoms:  • Sore Throat  • Shortness of Breath or difficulty breathing  • Cough  • Chills  • Body soreness or muscle pain  • Headache  • Fever  • New loss of taste or smell  • Do not arrive for your surgery ill.  Your procedure will need to be rescheduled to another time.  You will need to call your physician before the day of surgery to avoid any unnecessary exposure to hospital staff as well as other patients.    CHLORHEXIDINE CLOTH INSTRUCTIONS  The morning of surgery follow these instructions using the Chlorhexidine cloths you've been given.  These steps reduce bacteria on the body.  Do not use the cloths near your eyes, ears mouth, genitalia or on open wounds.  Throw the cloths away after use but do not try to flush them down a toilet.      • Open and remove one  cloth at a time from the package.    • Leave the cloth unfolded and begin the bathing.  • Massage the skin with the cloths using gentle pressure to remove bacteria.  Do not scrub harshly.   • Follow the steps below with one 2% CHG cloth per area (6 total cloths).  • One cloth for neck, shoulders and chest.  • One cloth for both arms, hands, fingers and underarms (do underarms last).  • One cloth for the abdomen followed by groin.  • One cloth for right leg and foot including between the toes.  • One cloth for left leg and foot including between the toes.  • The last cloth is to be used for the back of the neck, back and buttocks.    Allow the CHG to air dry 3 minutes on the skin which will give it time to work and decrease the chance of irritation.  The skin may feel sticky until it is dry.  Do not rinse with water or any other liquid or you will lose the beneficial effects of the CHG.  If mild skin irritation occurs, do rinse the skin to remove the CHG.  Report this to the nurse at time of admission.  Do not apply lotions, creams, ointments, deodorants or perfumes after using the clothes. Dress in clean clothes before coming to the hospital.

## 2021-03-11 LAB — SARS-COV-2 RNA RESP QL NAA+PROBE: NOT DETECTED

## 2021-03-12 ENCOUNTER — ANESTHESIA EVENT (OUTPATIENT)
Dept: PERIOP | Facility: HOSPITAL | Age: 76
End: 2021-03-12

## 2021-03-12 ENCOUNTER — ANESTHESIA (OUTPATIENT)
Dept: PERIOP | Facility: HOSPITAL | Age: 76
End: 2021-03-12

## 2021-03-12 ENCOUNTER — HOSPITAL ENCOUNTER (INPATIENT)
Facility: HOSPITAL | Age: 76
LOS: 1 days | Discharge: HOME | End: 2021-03-13
Attending: SURGERY | Admitting: SURGERY

## 2021-03-12 DIAGNOSIS — I73.9 PVD (PERIPHERAL VASCULAR DISEASE) (HCC): Primary | ICD-10-CM

## 2021-03-12 LAB
GLUCOSE BLDC GLUCOMTR-MCNC: 176 MG/DL (ref 70–130)
GLUCOSE BLDC GLUCOMTR-MCNC: 181 MG/DL (ref 70–130)
GLUCOSE BLDC GLUCOMTR-MCNC: 326 MG/DL (ref 70–130)

## 2021-03-12 PROCEDURE — 25010000002 PROTAMINE SULFATE PER 10 MG: Performed by: ANESTHESIOLOGY

## 2021-03-12 PROCEDURE — 25010000002 PROPOFOL 10 MG/ML EMULSION: Performed by: NURSE ANESTHETIST, CERTIFIED REGISTERED

## 2021-03-12 PROCEDURE — 25010000003 LIDOCAINE 1 % SOLUTION 20 ML VIAL: Performed by: SURGERY

## 2021-03-12 PROCEDURE — 25010000002 HEPARIN (PORCINE) PER 1000 UNITS: Performed by: SURGERY

## 2021-03-12 PROCEDURE — 04UM07Z SUPPLEMENT RIGHT POPLITEAL ARTERY WITH AUTOLOGOUS TISSUE SUBSTITUTE, OPEN APPROACH: ICD-10-PCS | Performed by: SURGERY

## 2021-03-12 PROCEDURE — 25010000002 FENTANYL CITRATE (PF) 100 MCG/2ML SOLUTION: Performed by: NURSE ANESTHETIST, CERTIFIED REGISTERED

## 2021-03-12 PROCEDURE — 25010000002 HYDROMORPHONE PER 4 MG: Performed by: ANESTHESIOLOGY

## 2021-03-12 PROCEDURE — 04CM0ZZ EXTIRPATION OF MATTER FROM RIGHT POPLITEAL ARTERY, OPEN APPROACH: ICD-10-PCS | Performed by: SURGERY

## 2021-03-12 PROCEDURE — 25010000003 CEFAZOLIN IN DEXTROSE 2-4 GM/100ML-% SOLUTION: Performed by: SURGERY

## 2021-03-12 PROCEDURE — 25010000002 MIDAZOLAM PER 1 MG: Performed by: ANESTHESIOLOGY

## 2021-03-12 PROCEDURE — 06BP0ZZ EXCISION OF RIGHT SAPHENOUS VEIN, OPEN APPROACH: ICD-10-PCS | Performed by: SURGERY

## 2021-03-12 PROCEDURE — 25010000002 PHENYLEPHRINE PER 1 ML: Performed by: NURSE ANESTHETIST, CERTIFIED REGISTERED

## 2021-03-12 PROCEDURE — 25010000002 HYDRALAZINE PER 20 MG: Performed by: ANESTHESIOLOGY

## 2021-03-12 PROCEDURE — 85347 COAGULATION TIME ACTIVATED: CPT

## 2021-03-12 PROCEDURE — 25010000002 FENTANYL CITRATE (PF) 100 MCG/2ML SOLUTION: Performed by: ANESTHESIOLOGY

## 2021-03-12 PROCEDURE — 25010000002 DEXAMETHASONE PER 1 MG: Performed by: NURSE ANESTHETIST, CERTIFIED REGISTERED

## 2021-03-12 PROCEDURE — 85014 HEMATOCRIT: CPT

## 2021-03-12 PROCEDURE — 25010000002 HEPARIN (PORCINE) PER 1000 UNITS: Performed by: NURSE ANESTHETIST, CERTIFIED REGISTERED

## 2021-03-12 PROCEDURE — 25010000003 CEFAZOLIN PER 500 MG: Performed by: SURGERY

## 2021-03-12 PROCEDURE — 85018 HEMOGLOBIN: CPT

## 2021-03-12 PROCEDURE — 25010000002 ONDANSETRON PER 1 MG: Performed by: NURSE ANESTHETIST, CERTIFIED REGISTERED

## 2021-03-12 PROCEDURE — 63710000001 INSULIN LISPRO (HUMAN) PER 5 UNITS: Performed by: SURGERY

## 2021-03-12 PROCEDURE — 82803 BLOOD GASES ANY COMBINATION: CPT

## 2021-03-12 PROCEDURE — 82962 GLUCOSE BLOOD TEST: CPT

## 2021-03-12 PROCEDURE — 82947 ASSAY GLUCOSE BLOOD QUANT: CPT

## 2021-03-12 DEVICE — FLOSEAL HEMOSTATIC MATRIX, 5ML
Type: IMPLANTABLE DEVICE | Site: LEG | Status: FUNCTIONAL
Brand: FLOSEAL HEMOSTATIC MATRIX

## 2021-03-12 RX ORDER — MIDAZOLAM HYDROCHLORIDE 1 MG/ML
0.5 INJECTION INTRAMUSCULAR; INTRAVENOUS
Status: DISCONTINUED | OUTPATIENT
Start: 2021-03-12 | End: 2021-03-12 | Stop reason: HOSPADM

## 2021-03-12 RX ORDER — GLIPIZIDE 5 MG/1
5 TABLET ORAL
Status: DISCONTINUED | OUTPATIENT
Start: 2021-03-12 | End: 2021-03-13 | Stop reason: HOSPADM

## 2021-03-12 RX ORDER — DIPHENHYDRAMINE HCL 25 MG
25 CAPSULE ORAL
Status: DISCONTINUED | OUTPATIENT
Start: 2021-03-12 | End: 2021-03-12 | Stop reason: HOSPADM

## 2021-03-12 RX ORDER — HYDROMORPHONE HCL 110MG/55ML
PATIENT CONTROLLED ANALGESIA SYRINGE INTRAVENOUS AS NEEDED
Status: DISCONTINUED | OUTPATIENT
Start: 2021-03-12 | End: 2021-03-12 | Stop reason: SURG

## 2021-03-12 RX ORDER — NICOTINE 21 MG/24HR
1 PATCH, TRANSDERMAL 24 HOURS TRANSDERMAL EVERY 24 HOURS
Status: DISCONTINUED | OUTPATIENT
Start: 2021-03-12 | End: 2021-03-13 | Stop reason: HOSPADM

## 2021-03-12 RX ORDER — HYDRALAZINE HYDROCHLORIDE 20 MG/ML
INJECTION INTRAMUSCULAR; INTRAVENOUS AS NEEDED
Status: DISCONTINUED | OUTPATIENT
Start: 2021-03-12 | End: 2021-03-12 | Stop reason: SURG

## 2021-03-12 RX ORDER — FLUMAZENIL 0.1 MG/ML
0.2 INJECTION INTRAVENOUS AS NEEDED
Status: DISCONTINUED | OUTPATIENT
Start: 2021-03-12 | End: 2021-03-12 | Stop reason: HOSPADM

## 2021-03-12 RX ORDER — ATORVASTATIN CALCIUM 20 MG/1
40 TABLET, FILM COATED ORAL DAILY
Status: DISCONTINUED | OUTPATIENT
Start: 2021-03-12 | End: 2021-03-13 | Stop reason: HOSPADM

## 2021-03-12 RX ORDER — PROTAMINE SULFATE 10 MG/ML
INJECTION, SOLUTION INTRAVENOUS AS NEEDED
Status: DISCONTINUED | OUTPATIENT
Start: 2021-03-12 | End: 2021-03-12 | Stop reason: SURG

## 2021-03-12 RX ORDER — ROCURONIUM BROMIDE 10 MG/ML
INJECTION, SOLUTION INTRAVENOUS AS NEEDED
Status: DISCONTINUED | OUTPATIENT
Start: 2021-03-12 | End: 2021-03-12 | Stop reason: SURG

## 2021-03-12 RX ORDER — SODIUM CHLORIDE 0.9 % (FLUSH) 0.9 %
3 SYRINGE (ML) INJECTION EVERY 12 HOURS SCHEDULED
Status: DISCONTINUED | OUTPATIENT
Start: 2021-03-12 | End: 2021-03-12 | Stop reason: HOSPADM

## 2021-03-12 RX ORDER — EPHEDRINE SULFATE 50 MG/ML
5 INJECTION, SOLUTION INTRAVENOUS ONCE AS NEEDED
Status: DISCONTINUED | OUTPATIENT
Start: 2021-03-12 | End: 2021-03-12 | Stop reason: HOSPADM

## 2021-03-12 RX ORDER — OXYCODONE AND ACETAMINOPHEN 7.5; 325 MG/1; MG/1
1 TABLET ORAL ONCE AS NEEDED
Status: DISCONTINUED | OUTPATIENT
Start: 2021-03-12 | End: 2021-03-12 | Stop reason: HOSPADM

## 2021-03-12 RX ORDER — NICOTINE POLACRILEX 4 MG
15 LOZENGE BUCCAL
Status: DISCONTINUED | OUTPATIENT
Start: 2021-03-12 | End: 2021-03-13 | Stop reason: HOSPADM

## 2021-03-12 RX ORDER — LIDOCAINE HYDROCHLORIDE 20 MG/ML
INJECTION, SOLUTION INFILTRATION; PERINEURAL AS NEEDED
Status: DISCONTINUED | OUTPATIENT
Start: 2021-03-12 | End: 2021-03-12 | Stop reason: SURG

## 2021-03-12 RX ORDER — NALOXONE HCL 0.4 MG/ML
0.4 VIAL (ML) INJECTION
Status: DISCONTINUED | OUTPATIENT
Start: 2021-03-12 | End: 2021-03-13 | Stop reason: HOSPADM

## 2021-03-12 RX ORDER — SODIUM CHLORIDE 0.9 % (FLUSH) 0.9 %
3-10 SYRINGE (ML) INJECTION AS NEEDED
Status: DISCONTINUED | OUTPATIENT
Start: 2021-03-12 | End: 2021-03-12 | Stop reason: HOSPADM

## 2021-03-12 RX ORDER — KETAMINE HYDROCHLORIDE 10 MG/ML
INJECTION INTRAMUSCULAR; INTRAVENOUS AS NEEDED
Status: DISCONTINUED | OUTPATIENT
Start: 2021-03-12 | End: 2021-03-12 | Stop reason: SURG

## 2021-03-12 RX ORDER — MIDAZOLAM HYDROCHLORIDE 1 MG/ML
1 INJECTION INTRAMUSCULAR; INTRAVENOUS
Status: DISCONTINUED | OUTPATIENT
Start: 2021-03-12 | End: 2021-03-12 | Stop reason: HOSPADM

## 2021-03-12 RX ORDER — FAMOTIDINE 10 MG/ML
20 INJECTION, SOLUTION INTRAVENOUS ONCE
Status: DISCONTINUED | OUTPATIENT
Start: 2021-03-12 | End: 2021-03-12 | Stop reason: HOSPADM

## 2021-03-12 RX ORDER — HYDRALAZINE HYDROCHLORIDE 20 MG/ML
5 INJECTION INTRAMUSCULAR; INTRAVENOUS
Status: DISCONTINUED | OUTPATIENT
Start: 2021-03-12 | End: 2021-03-12 | Stop reason: HOSPADM

## 2021-03-12 RX ORDER — LABETALOL HYDROCHLORIDE 5 MG/ML
5 INJECTION, SOLUTION INTRAVENOUS
Status: DISCONTINUED | OUTPATIENT
Start: 2021-03-12 | End: 2021-03-12 | Stop reason: HOSPADM

## 2021-03-12 RX ORDER — PROMETHAZINE HYDROCHLORIDE 25 MG/1
25 SUPPOSITORY RECTAL ONCE AS NEEDED
Status: DISCONTINUED | OUTPATIENT
Start: 2021-03-12 | End: 2021-03-12 | Stop reason: HOSPADM

## 2021-03-12 RX ORDER — MAGNESIUM HYDROXIDE 1200 MG/15ML
LIQUID ORAL AS NEEDED
Status: DISCONTINUED | OUTPATIENT
Start: 2021-03-12 | End: 2021-03-12 | Stop reason: HOSPADM

## 2021-03-12 RX ORDER — MORPHINE SULFATE 2 MG/ML
2 INJECTION, SOLUTION INTRAMUSCULAR; INTRAVENOUS EVERY 4 HOURS PRN
Status: DISCONTINUED | OUTPATIENT
Start: 2021-03-12 | End: 2021-03-13 | Stop reason: HOSPADM

## 2021-03-12 RX ORDER — CEFAZOLIN SODIUM 2 G/100ML
2 INJECTION, SOLUTION INTRAVENOUS EVERY 8 HOURS
Status: COMPLETED | OUTPATIENT
Start: 2021-03-12 | End: 2021-03-13

## 2021-03-12 RX ORDER — EPHEDRINE SULFATE 50 MG/ML
INJECTION, SOLUTION INTRAVENOUS AS NEEDED
Status: DISCONTINUED | OUTPATIENT
Start: 2021-03-12 | End: 2021-03-12 | Stop reason: SURG

## 2021-03-12 RX ORDER — CEFAZOLIN SODIUM 2 G/100ML
2 INJECTION, SOLUTION INTRAVENOUS ONCE
Status: COMPLETED | OUTPATIENT
Start: 2021-03-12 | End: 2021-03-12

## 2021-03-12 RX ORDER — CLOPIDOGREL BISULFATE 75 MG/1
75 TABLET ORAL DAILY
Qty: 90 TABLET | Refills: 0 | Status: SHIPPED | OUTPATIENT
Start: 2021-03-12 | End: 2021-05-24 | Stop reason: SDUPTHER

## 2021-03-12 RX ORDER — HEPARIN SODIUM 1000 [USP'U]/ML
INJECTION, SOLUTION INTRAVENOUS; SUBCUTANEOUS AS NEEDED
Status: DISCONTINUED | OUTPATIENT
Start: 2021-03-12 | End: 2021-03-12 | Stop reason: SURG

## 2021-03-12 RX ORDER — FENTANYL CITRATE 50 UG/ML
50 INJECTION, SOLUTION INTRAMUSCULAR; INTRAVENOUS
Status: DISCONTINUED | OUTPATIENT
Start: 2021-03-12 | End: 2021-03-12 | Stop reason: HOSPADM

## 2021-03-12 RX ORDER — LIDOCAINE HYDROCHLORIDE 10 MG/ML
0.5 INJECTION, SOLUTION EPIDURAL; INFILTRATION; INTRACAUDAL; PERINEURAL ONCE AS NEEDED
Status: DISCONTINUED | OUTPATIENT
Start: 2021-03-12 | End: 2021-03-12 | Stop reason: HOSPADM

## 2021-03-12 RX ORDER — NITROGLYCERIN 0.4 MG/1
0.4 TABLET SUBLINGUAL
Status: DISCONTINUED | OUTPATIENT
Start: 2021-03-12 | End: 2021-03-13 | Stop reason: HOSPADM

## 2021-03-12 RX ORDER — DIPHENHYDRAMINE HYDROCHLORIDE 50 MG/ML
12.5 INJECTION INTRAMUSCULAR; INTRAVENOUS
Status: DISCONTINUED | OUTPATIENT
Start: 2021-03-12 | End: 2021-03-12 | Stop reason: HOSPADM

## 2021-03-12 RX ORDER — ONDANSETRON 4 MG/1
4 TABLET, FILM COATED ORAL EVERY 6 HOURS PRN
Status: DISCONTINUED | OUTPATIENT
Start: 2021-03-12 | End: 2021-03-13 | Stop reason: HOSPADM

## 2021-03-12 RX ORDER — FENTANYL CITRATE 50 UG/ML
INJECTION, SOLUTION INTRAMUSCULAR; INTRAVENOUS AS NEEDED
Status: DISCONTINUED | OUTPATIENT
Start: 2021-03-12 | End: 2021-03-12 | Stop reason: SURG

## 2021-03-12 RX ORDER — ONDANSETRON 2 MG/ML
4 INJECTION INTRAMUSCULAR; INTRAVENOUS EVERY 6 HOURS PRN
Status: DISCONTINUED | OUTPATIENT
Start: 2021-03-12 | End: 2021-03-13 | Stop reason: HOSPADM

## 2021-03-12 RX ORDER — PROPOFOL 10 MG/ML
VIAL (ML) INTRAVENOUS AS NEEDED
Status: DISCONTINUED | OUTPATIENT
Start: 2021-03-12 | End: 2021-03-12 | Stop reason: SURG

## 2021-03-12 RX ORDER — METOPROLOL SUCCINATE 50 MG/1
50 TABLET, EXTENDED RELEASE ORAL DAILY
Status: DISCONTINUED | OUTPATIENT
Start: 2021-03-13 | End: 2021-03-13 | Stop reason: HOSPADM

## 2021-03-12 RX ORDER — SODIUM CHLORIDE, SODIUM LACTATE, POTASSIUM CHLORIDE, CALCIUM CHLORIDE 600; 310; 30; 20 MG/100ML; MG/100ML; MG/100ML; MG/100ML
9 INJECTION, SOLUTION INTRAVENOUS CONTINUOUS
Status: DISCONTINUED | OUTPATIENT
Start: 2021-03-12 | End: 2021-03-12

## 2021-03-12 RX ORDER — DEXAMETHASONE SODIUM PHOSPHATE 10 MG/ML
INJECTION INTRAMUSCULAR; INTRAVENOUS AS NEEDED
Status: DISCONTINUED | OUTPATIENT
Start: 2021-03-12 | End: 2021-03-12 | Stop reason: SURG

## 2021-03-12 RX ORDER — PROMETHAZINE HYDROCHLORIDE 25 MG/1
25 TABLET ORAL ONCE AS NEEDED
Status: DISCONTINUED | OUTPATIENT
Start: 2021-03-12 | End: 2021-03-12 | Stop reason: HOSPADM

## 2021-03-12 RX ORDER — CLOPIDOGREL BISULFATE 75 MG/1
75 TABLET ORAL DAILY
Status: DISCONTINUED | OUTPATIENT
Start: 2021-03-13 | End: 2021-03-13 | Stop reason: HOSPADM

## 2021-03-12 RX ORDER — HYDROCODONE BITARTRATE AND ACETAMINOPHEN 7.5; 325 MG/1; MG/1
1 TABLET ORAL ONCE AS NEEDED
Status: DISCONTINUED | OUTPATIENT
Start: 2021-03-12 | End: 2021-03-12 | Stop reason: HOSPADM

## 2021-03-12 RX ORDER — ONDANSETRON 2 MG/ML
4 INJECTION INTRAMUSCULAR; INTRAVENOUS ONCE AS NEEDED
Status: DISCONTINUED | OUTPATIENT
Start: 2021-03-12 | End: 2021-03-12 | Stop reason: HOSPADM

## 2021-03-12 RX ORDER — HYDROCODONE BITARTRATE AND ACETAMINOPHEN 5; 325 MG/1; MG/1
1 TABLET ORAL EVERY 4 HOURS PRN
Status: DISCONTINUED | OUTPATIENT
Start: 2021-03-12 | End: 2021-03-13 | Stop reason: HOSPADM

## 2021-03-12 RX ORDER — ONDANSETRON 2 MG/ML
INJECTION INTRAMUSCULAR; INTRAVENOUS AS NEEDED
Status: DISCONTINUED | OUTPATIENT
Start: 2021-03-12 | End: 2021-03-12 | Stop reason: SURG

## 2021-03-12 RX ORDER — INSULIN LISPRO 100 [IU]/ML
0-7 INJECTION, SOLUTION INTRAVENOUS; SUBCUTANEOUS
Status: DISCONTINUED | OUTPATIENT
Start: 2021-03-12 | End: 2021-03-13 | Stop reason: HOSPADM

## 2021-03-12 RX ORDER — HYDROMORPHONE HYDROCHLORIDE 1 MG/ML
0.5 INJECTION, SOLUTION INTRAMUSCULAR; INTRAVENOUS; SUBCUTANEOUS
Status: DISCONTINUED | OUTPATIENT
Start: 2021-03-12 | End: 2021-03-12 | Stop reason: HOSPADM

## 2021-03-12 RX ORDER — PANTOPRAZOLE SODIUM 40 MG/1
40 TABLET, DELAYED RELEASE ORAL EVERY MORNING
Status: DISCONTINUED | OUTPATIENT
Start: 2021-03-13 | End: 2021-03-13 | Stop reason: HOSPADM

## 2021-03-12 RX ORDER — DEXTROSE MONOHYDRATE 25 G/50ML
25 INJECTION, SOLUTION INTRAVENOUS
Status: DISCONTINUED | OUTPATIENT
Start: 2021-03-12 | End: 2021-03-13 | Stop reason: HOSPADM

## 2021-03-12 RX ORDER — NALOXONE HCL 0.4 MG/ML
0.2 VIAL (ML) INJECTION AS NEEDED
Status: DISCONTINUED | OUTPATIENT
Start: 2021-03-12 | End: 2021-03-12 | Stop reason: HOSPADM

## 2021-03-12 RX ORDER — GABAPENTIN 300 MG/1
300 CAPSULE ORAL 3 TIMES DAILY
Status: DISCONTINUED | OUTPATIENT
Start: 2021-03-12 | End: 2021-03-13 | Stop reason: HOSPADM

## 2021-03-12 RX ADMIN — PROTAMINE SULFATE 40 MG: 10 INJECTION, SOLUTION INTRAVENOUS at 15:08

## 2021-03-12 RX ADMIN — FENTANYL CITRATE 50 MCG: 50 INJECTION INTRAMUSCULAR; INTRAVENOUS at 14:05

## 2021-03-12 RX ADMIN — HYDRALAZINE HYDROCHLORIDE 10 MG: 20 INJECTION, SOLUTION INTRAMUSCULAR; INTRAVENOUS at 15:22

## 2021-03-12 RX ADMIN — GABAPENTIN 300 MG: 300 CAPSULE ORAL at 20:13

## 2021-03-12 RX ADMIN — KETAMINE HYDROCHLORIDE 30 MG: 10 INJECTION INTRAMUSCULAR; INTRAVENOUS at 15:05

## 2021-03-12 RX ADMIN — FENTANYL CITRATE 50 MCG: 50 INJECTION INTRAMUSCULAR; INTRAVENOUS at 13:34

## 2021-03-12 RX ADMIN — EPHEDRINE SULFATE 10 MG: 50 INJECTION INTRAVENOUS at 14:29

## 2021-03-12 RX ADMIN — LIDOCAINE HYDROCHLORIDE 100 MG: 20 INJECTION, SOLUTION INFILTRATION; PERINEURAL at 13:34

## 2021-03-12 RX ADMIN — SODIUM CHLORIDE, POTASSIUM CHLORIDE, SODIUM LACTATE AND CALCIUM CHLORIDE: 600; 310; 30; 20 INJECTION, SOLUTION INTRAVENOUS at 13:25

## 2021-03-12 RX ADMIN — PHENYLEPHRINE HYDROCHLORIDE 100 MCG: 10 INJECTION INTRAVENOUS at 13:34

## 2021-03-12 RX ADMIN — CEFAZOLIN SODIUM 2 G: 2 INJECTION, SOLUTION INTRAVENOUS at 13:37

## 2021-03-12 RX ADMIN — GLIPIZIDE 5 MG: 5 TABLET ORAL at 20:13

## 2021-03-12 RX ADMIN — MIDAZOLAM 0.5 MG: 1 INJECTION INTRAMUSCULAR; INTRAVENOUS at 13:05

## 2021-03-12 RX ADMIN — ONDANSETRON 4 MG: 2 INJECTION INTRAMUSCULAR; INTRAVENOUS at 13:34

## 2021-03-12 RX ADMIN — LISINOPRIL: 10 TABLET ORAL at 20:13

## 2021-03-12 RX ADMIN — PROPOFOL 200 MG: 10 INJECTION, EMULSION INTRAVENOUS at 13:34

## 2021-03-12 RX ADMIN — HEPARIN SODIUM 7500 UNITS: 1000 INJECTION INTRAVENOUS; SUBCUTANEOUS at 14:29

## 2021-03-12 RX ADMIN — ROCURONIUM BROMIDE 50 MG: 50 INJECTION INTRAVENOUS at 13:34

## 2021-03-12 RX ADMIN — HYDROMORPHONE HYDROCHLORIDE 0.5 MG: 2 INJECTION, SOLUTION INTRAMUSCULAR; INTRAVENOUS; SUBCUTANEOUS at 15:10

## 2021-03-12 RX ADMIN — METFORMIN HYDROCHLORIDE 1000 MG: 1000 TABLET ORAL at 20:13

## 2021-03-12 RX ADMIN — ATORVASTATIN CALCIUM 40 MG: 20 TABLET, FILM COATED ORAL at 20:13

## 2021-03-12 RX ADMIN — INSULIN LISPRO 5 UNITS: 100 INJECTION, SOLUTION INTRAVENOUS; SUBCUTANEOUS at 21:40

## 2021-03-12 RX ADMIN — PHENYLEPHRINE HYDROCHLORIDE 100 MCG: 10 INJECTION INTRAVENOUS at 14:22

## 2021-03-12 RX ADMIN — PHENYLEPHRINE HYDROCHLORIDE 100 MCG: 10 INJECTION INTRAVENOUS at 14:01

## 2021-03-12 RX ADMIN — CEFAZOLIN SODIUM 2 G: 2 INJECTION, SOLUTION INTRAVENOUS at 20:56

## 2021-03-12 RX ADMIN — EPHEDRINE SULFATE 10 MG: 50 INJECTION INTRAVENOUS at 14:45

## 2021-03-12 RX ADMIN — DEXAMETHASONE SODIUM PHOSPHATE 4 MG: 10 INJECTION INTRAMUSCULAR; INTRAVENOUS at 13:34

## 2021-03-12 RX ADMIN — ONDANSETRON 4 MG: 2 INJECTION INTRAMUSCULAR; INTRAVENOUS at 15:16

## 2021-03-12 RX ADMIN — PHENYLEPHRINE HYDROCHLORIDE 100 MCG: 10 INJECTION INTRAVENOUS at 14:29

## 2021-03-12 RX ADMIN — FENTANYL CITRATE 25 MCG: 50 INJECTION, SOLUTION INTRAMUSCULAR; INTRAVENOUS at 13:05

## 2021-03-12 NOTE — ANESTHESIA PREPROCEDURE EVALUATION
" Anesthesia Evaluation     Patient summary reviewed and Nursing notes reviewed   no history of anesthetic complications:  NPO Solid Status: > 8 hours  NPO Liquid Status: > 2 hours           Airway   Mallampati: II  TM distance: >3 FB  Neck ROM: full  No difficulty expected  Dental    (+) upper dentures        Pulmonary - normal exam   (+) a smoker Former,   Cardiovascular - normal exam    ECG reviewed  PT is on anticoagulation therapy  Patient on routine beta blocker and Beta blocker given within 24 hours of surgery    (+) hypertension, past MI (x2, 1996 and somewhat recently, more than a year ago)  >12 months, CAD, PVD (claudication), hyperlipidemia,       Neuro/Psych  (+) numbness (right foot numbness /sciatica),     GI/Hepatic/Renal/Endo    (+)  GERD well controlled,  diabetes mellitus type 2,     Musculoskeletal     (+) back pain,   Abdominal  - normal exam   Substance History      OB/GYN          Other        ROS/Med Hx Other: Stress test 3/21    · Myocardial perfusion imaging indicates a medium-sized infarct located in the lateral wall with no significant ischemia noted.  · Left ventricular ejection fraction is mildly reduced. (Calculated EF = 40%). Abnormal LV wall motion consistent with akinesis.    INTERMEDIATE cardiac risk per cardiology, cleared for surgery      Phys Exam Other: Periodontal disease                Anesthesia Plan    ASA 3     general   (Arterial line  I have reviewed the patient's history with the patient and the chart, including all pertinent laboratory results and imaging. I have explained the risks of anesthesia including but not limited to dental damage, corneal abrasion, nerve injury, MI, stroke, and death.    /81 (BP Location: Left arm, Patient Position: Lying)   Pulse 76   Temp 36.8 °C (98.3 °F) (Oral)   Resp 8   Ht 172.7 cm (68\")   Wt 69.8 kg (153 lb 12.8 oz)   SpO2 99%   BMI 23.39 kg/m²   )  intravenous induction     Anesthetic plan, all risks, benefits, and " alternatives have been provided, discussed and informed consent has been obtained with: patient.    Plan discussed with CRNA.

## 2021-03-12 NOTE — H&P
Name: Niraj Hassan ADMIT: 3/12/2021   : 1945  PCP: Joanna Salvador APRN    MRN: [id number] LOS: 0 days   AGE/SEX: 75 y.o. male  ROOM: [facility]      Patient Care Team:  Joanna Salvador APRN as PCP - General (Nurse Practitioner)  Khadra Chester APRN as Nurse Practitioner (Gastroenterology)  Dharmesh Collazo MD as Surgeon (Vascular Surgery)  Shamir Manriquez MD as Consulting Physician (Cardiology)  No chief complaint on file.    CC: Preop graft revision.    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    Review of Systems   All other systems reviewed and are negative.      Past Medical History:   Diagnosis Date   • CAD (coronary artery disease)    • Diabetes mellitus (CMS/[facility])    • GERD (gastroesophageal reflux disease)    • Hyperlipidemia    • Hypertension    • Myocardial infarction (CMS/[facility]) ,    • PVD (peripheral vascular disease) (CMS/[facility])    • Sciatic nerve pain      Past Surgical History:   Procedure Laterality Date   • ARTERIAL BYPASS SURGERY      RIGHT LEG   • CARDIAC CATHETERIZATION      showed total occlusion of the RCA, mild left main disease, and moderate disease of the circumflex.   • COLONOSCOPY     • CORONARY ANGIOPLASTY WITH STENT PLACEMENT      angioplasty and stent placement to the circumflex   • ENDOSCOPY       Family History   Problem Relation Age of Onset   • Heart attack Mother    • Cancer Mother    • Heart disease Mother         PACEMAKER   • Heart attack Father    • Colon polyps Neg Hx    • Colon cancer Neg Hx    • Malig Hyperthermia Neg Hx        Social History     Tobacco Use   • Smoking status: Former Smoker     Packs/day: 1.00     Years: 59.00     Pack years: 59.00     Types: Cigarettes     Quit date: 2021     Years since quittin.1   • Smokeless tobacco: Never Used   • Tobacco comment: caffeine use   Substance Use Topics   • Alcohol use: Yes     Comment: / socially          • Drug use:  No     Medications Prior to Admission   Medication Sig Dispense Refill Last Dose   • atorvastatin (LIPITOR) 40 MG tablet TAKE ONE TABLET BY MOUTH DAILY (Patient taking differently: Take 40 mg by mouth Every Night.) 90 tablet 1 3/11/2021 at 2230   • cetirizine (ZyrTEC) 10 MG tablet Take 10 mg by mouth Daily.   3/11/2021 at 2230   • Chlorhexidine Gluconate 2 % pads Apply  topically. AS DIRECTED FOR PRE OP   3/12/2021 at 1000   • clopidogrel (PLAVIX) 75 MG tablet TAKE ONE TABLET BY MOUTH DAILY (Patient taking differently: Take 75 mg by mouth Daily. STAY ON PER DR. CHA) 90 tablet 1 3/12/2021 at 0800   • Digestive Enzymes (DIGESTIVE ENZYME PO) Take 1 tablet by mouth Daily.   3/12/2021 at 0800   • gabapentin (NEURONTIN) 300 MG capsule Take 1 capsule by mouth 3 (Three) Times a Day. 90 capsule 2 3/11/2021 at 2230   • glipizide (Glucotrol) 5 MG tablet Take 1 tablet by mouth Daily Before Supper. 30 tablet 1 3/11/2021 at 1800   • lansoprazole (PREVACID) 15 MG capsule Take 1 capsule by mouth Daily. (Patient taking differently: Take 15 mg by mouth Every Morning.) 90 capsule 1 3/12/2021 at 0800   • lisinopril-hydrochlorothiazide (PRINZIDE,ZESTORETIC) 20-12.5 MG per tablet TAKE ONE TABLET BY MOUTH DAILY (Patient taking differently: Take 1 tablet by mouth Daily.) 90 tablet 0 3/12/2021 at 0800   • Melatonin 10 MG capsule Take 10 mg by mouth Every Night.   3/11/2021 at 2230   • metFORMIN (GLUCOPHAGE) 500 MG tablet Take 2 tablets by mouth 2 (Two) Times a Day With Meals. 360 tablet 0 3/11/2021 at 2230   • metoprolol succinate XL (TOPROL-XL) 50 MG 24 hr tablet TAKE ONE TABLET BY MOUTH DAILY (Patient taking differently: Take 50 mg by mouth Every Morning.) 90 tablet 1 3/12/2021 at 0800   • Misc Natural Products (TART CHERRY ADVANCED) capsule Take 1 tablet by mouth Daily.   3/9/2021   • Multiple Vitamin (MULTIVITAMIN) capsule Take 1 capsule by mouth Daily.   3/9/2021   • nicotine (NICODERM CQ) 21 MG/24HR patch Place 1 patch on the  "skin as directed by provider Daily. 28 patch 1 3/9/2021   • Omega-3 Fatty Acids (FISH OIL) 1200 MG capsule capsule Take 1,200 mg by mouth Daily With Breakfast.   3/9/2021   • Triamcinolone Acetonide (NASACORT) 55 MCG/ACT nasal inhaler 2 sprays into each nostril daily.   3/9/2021     ceFAZolin, 2 g, Intravenous, Once  famotidine, 20 mg, Intravenous, Once  sodium chloride, 3 mL, Intravenous, Q12H      lactated ringers, 9 mL/hr      fentanyl  •  lidocaine PF 1%  •  midazolam **OR** midazolam  •  sodium chloride  Patient has no known allergies.    Objective     Physical Exam:  Physical Exam  Constitutional:       Appearance: He is well-developed.   Pulmonary:      Effort: Pulmonary effort is normal. No respiratory distress.   Abdominal:      General: There is no distension.      Palpations: Abdomen is soft.   Neurological:      Mental Status: He is alert and oriented to person, place, and time.          Vital Signs and Labs:  Vital Signs   Patient Vitals for the past 24 hrs:   BP Temp Temp src Pulse Resp SpO2 Height Weight   03/12/21 1310 159/57 -- -- 65 19 100 % -- --   03/12/21 1309 -- -- -- 77 -- -- -- --   03/12/21 1307 154/59 -- -- 68 19 99 % -- --   03/12/21 1306 -- -- -- 70 -- -- -- --   03/12/21 1303 -- -- -- 82 -- -- -- --   03/12/21 1300 -- -- -- 66 -- -- -- --   03/12/21 1257 -- -- -- 84 -- -- -- --   03/12/21 1254 -- -- -- 77 -- -- -- --   03/12/21 1251 -- -- -- 74 -- -- -- --   03/12/21 1248 -- -- -- 68 -- -- -- --   03/12/21 1245 -- -- -- 74 -- -- -- --   03/12/21 1242 -- -- -- 75 -- -- -- --   03/12/21 1240 -- -- -- 75 -- -- -- --   03/12/21 1236 -- -- -- 72 -- -- -- --   03/12/21 1235 152/81 -- -- 76 8 99 % -- --   03/12/21 1155 167/97 98.3 °F (36.8 °C) Oral 76 20 100 % 172.7 cm (68\") 69.8 kg (153 lb 12.8 oz)     I/O:  No intake/output data recorded.    CBC    Results from last 7 days   Lab Units 03/10/21  1605   WBC 10*3/mm3 8.45   HEMOGLOBIN g/dL 13.6   PLATELETS 10*3/mm3 192     BMP   Results from " last 7 days   Lab Units 03/10/21  1605   SODIUM mmol/L 139   POTASSIUM mmol/L 4.8   CHLORIDE mmol/L 104   CO2 mmol/L 23.5   BUN mg/dL 15   CREATININE mg/dL 0.81   GLUCOSE mg/dL 337*     Cr Clearance Estimated Creatinine Clearance: 79 mL/min (by C-G formula based on SCr of 0.81 mg/dL).  Coag     HbA1C   Lab Results   Component Value Date    HGBA1C 8.22 (H) 01/22/2021    HGBA1C 7.80 (H) 09/30/2020    HGBA1C 8.30 (H) 06/30/2020     Blood Glucose   Glucose   Date/Time Value Ref Range Status   03/12/2021 1139 176 (H) 70 - 130 mg/dL Final       Active Hospital Problems    Diagnosis  POA   • **Claudication (CMS/[facility]) [I73.9]  Yes      Resolved Hospital Problems   No resolved problems to display.     Assessment/Plan       Claudication (CMS/[facility])    75 y.o. male patient with a right femoral to above-knee popliteal artery saphenous vein bypass graft that was placed at outside facility in the remote past.  He has had a distal graft severe stenosis that I treated endovascularly back in October.  It is probably returned.  He has some residual saphenous vein in the calf which I am worried to perform a vein patch on.  This is all discussed with patient.  Informed them was obtained.    Of asked vascular lab to map the bypass graft and marked the area of severe stenosis which they have graciously performed.    I discussed the patients findings and my recommendations with patient and nursing staff.    Dharmesh Collazo MD  03/12/21  13:25 EST    Please call my office with any question: [phone]

## 2021-03-12 NOTE — OP NOTE
Date of Admission:  3/12/2021  Today's Date:  03/12/21  Dharmesh Collazo MD  [facility]    Preoperative Diagnosis:   Severe recurrent distal bypass graft stenosis of the right lower extremity with rest pain.    Postoperative Diagnosis:   Same    Procedure Performed:   Revision of right femoral to popliteal above-knee artery bypass with bypass graft endarterectomy and vein patch angioplasty.    Surgeon:   Dharmesh Collazo MD    Assistant:    [facility]istan [facility] , Provided critical assistance in exposure, retraction, and suction that overall decrease blood loss and operative time.    Anesthesia:   General    Estimated Blood Loss:    cc    Findings:    Severe atherosclerotic stenosis of the bypass graft that was quite focal.  My suspicions are this was related to a previous valve site that became severely atherosclerotic.  This was endarterectomized and then patched with some residual saphenous vein from the medial distal calf.    Strong posterior tibial artery signal.  Weak anterior tibial artery signal.    Implants:    Implant Name Type Inv. Item Serial No.  Lot No. LRB No. Used Action   KT SEAL HEMOS ABS FLOSEAL MATRX FAST/PREP 5ML - [id number] Implant KT SEAL HEMOS ABS FLOSEAL MATRX FAST/PREP 5ML  [facility] [id number] Right 1 Implanted       Staff:   Circulator: Sarah Camilo RN; Estrada Sal RN  Scrub Person: Jayden Calvin  Assistant: Bri Tristan CSA    Specimen:   none    Complications:   none    Dispo:   to PACU    Indication for procedure:   75 y.o. male with severe recurrent right femoral to above-knee popliteal artery bypass graft stenosis.  I performed laser arthrectomy and angioplasty of this with resolution back in October.  This is promptly recurred.  I discussed with him the risk benefits alternatives undergoing open revision.  Informed consent was obtained.    Description of procedure:   The patient was taken to the operating room, anesthesia was  "induced without difficulty. Surgical sites were prepped and draped in usual sterile manner.  A full surgical timeout was performed. Ultrasound was used to map the greater saphenous vein bypass graft. Incision was made, Bovie electrocautery was used to dissect down. The bypass graft was somewhat difficult to identify as it was deep to the fascia. Initially I  thought this was an in situ bypass but really it was a transposing tunneled bypass graft deep to the fascia. Once I discovered it was dissected from the very pulsatile normal proximal portion down to the stenotic portion to a soft clampable distal portion. The incision was made about 4\" over the medial distal calf over the greater saphenous vein. This was circumferentially controlled. Heparin was administered. After adequate timing the clamps were applied to bypass and it was opened up vertically and extended with Hernández scissors. Here the atherosclerotic severe stenosis that was near occlusive was identified. I was able to, with the freer elevator, perform an endarterectomy of some of the intraluminal content. Then the saphenous vein was transected and the calf opened up vertically and a patch angioplasty was performed. Good hemostasis  obtained. This promptly gave dramatically improved signal in the foot. There was also a pulse well into the mid to below knee popliteal artery which I was able to palpate.  At this point Protamine was administered. Deep tissue was closed with 2-0 Vicryl, superficial with 3-0 Vicryl. The skin was stapled closed. Overall patient tolerated the procedure well.     At case completion all instrument count, needle count, and sponge counts were correct x2.    Dharmesh Collazo MD  03/12/21     Active Hospital Problems    Diagnosis  POA   • **Claudication (CMS/[facility]) [I73.9]  Yes      Resolved Hospital Problems   No resolved problems to display.           "

## 2021-03-12 NOTE — ANESTHESIA PROCEDURE NOTES
Airway  Urgency: elective    Date/Time: 3/12/2021 1:36 PM  Airway not difficult    General Information and Staff    Patient location during procedure: OR  CRNA: Sameera Yen CRNA    Indications and Patient Condition  Indications for airway management: airway protection    Preoxygenated: yes  Mask difficulty assessment: 2 - vent by mask + OA or adjuvant +/- NMBA    Final Airway Details  Final airway type: endotracheal airway      Successful airway: ETT  Cuffed: yes   Successful intubation technique: direct laryngoscopy  Facilitating devices/methods: intubating stylet  Endotracheal tube insertion site: oral  Blade: Chris  Blade size: 4  ETT size (mm): 7.5  Cormack-Lehane Classification: grade I - full view of glottis  Placement verified by: chest auscultation and capnometry   Cuff volume (mL): 10  Measured from: lips  ETT/EBT  to lips (cm): 21  Number of attempts at approach: 1    Additional Comments  Atraumatic placement of ETT, dentition unchanged from preop.

## 2021-03-12 NOTE — ANESTHESIA PROCEDURE NOTES
Arterial Line      Patient reassessed immediately prior to procedure    Start time: 3/12/2021 1:05 PM  Stop Time:3/12/2021 1:10 PM       Line placed for ABGs/Labs/MD VIDYA/Surgeon request and hemodynamic monitoring.  Performed By   Anesthesiologist: Kristen Mascorro MD  Preanesthetic Checklist  Completed: patient identified, IV checked, site marked, risks and benefits discussed, surgical consent, monitors and equipment checked, pre-op evaluation and timeout performed  Arterial Line Prep   Sterile Tech: cap, gloves and sterile barriers  Prep: ChloraPrep  Patient monitoring: EKG, continuous pulse oximetry and blood pressure monitoring  Arterial Line Procedure   Laterality:left  Location:  radial artery  Catheter size: 20 G   Guidance: ultrasound guided  PROCEDURE NOTE/ULTRASOUND INTERPRETATION.  Using ultrasound guidance the potential vascular sites for insertion of the catheter were visualized to determine the patency of the vessel to be used for vascular access.  After selecting the appropriate site for insertion, the needle was visualized under ultrasound being inserted into the radial artery, followed by ultrasound confirmation of wire and catheter placement. There were no abnormalities seen on ultrasound; an image was taken; and the patient tolerated the procedure with no complications.   Number of attempts: 1  Successful placement: yes  Post Assessment   Dressing Type: wrist guard applied, secured with tape and occlusive dressing applied.   Complications no  Circ/Move/Sens Assessment: normal and unchanged.   Patient Tolerance: patient tolerated the procedure well with no apparent complications

## 2021-03-13 ENCOUNTER — READMISSION MANAGEMENT (OUTPATIENT)
Dept: CALL CENTER | Facility: HOSPITAL | Age: 76
End: 2021-03-13

## 2021-03-13 VITALS
RESPIRATION RATE: 16 BRPM | BODY MASS INDEX: 23.31 KG/M2 | OXYGEN SATURATION: 97 % | SYSTOLIC BLOOD PRESSURE: 121 MMHG | DIASTOLIC BLOOD PRESSURE: 80 MMHG | HEART RATE: 82 BPM | TEMPERATURE: 97.5 F | WEIGHT: 153.8 LBS | HEIGHT: 68 IN

## 2021-03-13 LAB
GLUCOSE BLDC GLUCOMTR-MCNC: 157 MG/DL (ref 70–130)
GLUCOSE BLDC GLUCOMTR-MCNC: 177 MG/DL (ref 70–130)

## 2021-03-13 PROCEDURE — 63710000001 INSULIN LISPRO (HUMAN) PER 5 UNITS: Performed by: SURGERY

## 2021-03-13 PROCEDURE — 25010000003 CEFAZOLIN IN DEXTROSE 2-4 GM/100ML-% SOLUTION: Performed by: SURGERY

## 2021-03-13 PROCEDURE — 82962 GLUCOSE BLOOD TEST: CPT

## 2021-03-13 PROCEDURE — 25010000002 ENOXAPARIN PER 10 MG: Performed by: SURGERY

## 2021-03-13 RX ORDER — HYDROCODONE BITARTRATE AND ACETAMINOPHEN 5; 325 MG/1; MG/1
2 TABLET ORAL EVERY 6 HOURS PRN
Qty: 24 TABLET | Refills: 0 | Status: SHIPPED | OUTPATIENT
Start: 2021-03-13 | End: 2021-04-05

## 2021-03-13 RX ADMIN — CEFAZOLIN SODIUM 2 G: 2 INJECTION, SOLUTION INTRAVENOUS at 06:07

## 2021-03-13 RX ADMIN — METOPROLOL SUCCINATE 50 MG: 50 TABLET, EXTENDED RELEASE ORAL at 09:06

## 2021-03-13 RX ADMIN — INSULIN LISPRO 2 UNITS: 100 INJECTION, SOLUTION INTRAVENOUS; SUBCUTANEOUS at 11:56

## 2021-03-13 RX ADMIN — ATORVASTATIN CALCIUM 40 MG: 20 TABLET, FILM COATED ORAL at 09:06

## 2021-03-13 RX ADMIN — LISINOPRIL: 10 TABLET ORAL at 09:06

## 2021-03-13 RX ADMIN — PANTOPRAZOLE SODIUM 40 MG: 40 TABLET, DELAYED RELEASE ORAL at 06:07

## 2021-03-13 RX ADMIN — INSULIN LISPRO 2 UNITS: 100 INJECTION, SOLUTION INTRAVENOUS; SUBCUTANEOUS at 09:06

## 2021-03-13 RX ADMIN — ENOXAPARIN SODIUM 40 MG: 40 INJECTION SUBCUTANEOUS at 09:11

## 2021-03-13 RX ADMIN — CLOPIDOGREL 75 MG: 75 TABLET, FILM COATED ORAL at 09:06

## 2021-03-13 RX ADMIN — GABAPENTIN 300 MG: 300 CAPSULE ORAL at 09:06

## 2021-03-13 NOTE — ANESTHESIA POSTPROCEDURE EVALUATION
"Patient: Niraj Hassan    Procedure Summary     Date: 03/12/21 Room / Location: [facility] OR  / [facility] MAIN OR    Anesthesia Start: 1325 Anesthesia Stop: 1557    Procedure: RIGHT LEG BYPASS GRAFT REVISON (Right Thigh) Diagnosis:     Surgeons: Dharmesh Collazo MD Provider: Kristen Mascorro MD    Anesthesia Type: general ASA Status: 3          Anesthesia Type: general    Vitals  Vitals Value Taken Time   /66 03/12/21 1815   Temp 36.4 °C (97.5 °F) 03/12/21 1815   Pulse 92 03/12/21 1819   Resp 18 03/12/21 1800   SpO2 98 % 03/12/21 1821   Vitals shown include unvalidated device data.        Post Anesthesia Care and Evaluation    Patient location during evaluation: PACU  Patient participation: complete - patient cannot participate  Pain management: adequate  Airway patency: patent  Anesthetic complications: No anesthetic complications    Cardiovascular status: acceptable  Respiratory status: acceptable  Hydration status: acceptable    Comments: /67   Pulse 89   Temp 36.4 °C (97.5 °F) (Oral)   Resp 18   Ht 172.7 cm (68\")   Wt 69.8 kg (153 lb 12.8 oz)   SpO2 98%   BMI 23.39 kg/m²     Patient discharged before being seen by an Anesthesiologist.  No anesthetic complications noted per record.      "

## 2021-03-13 NOTE — PLAN OF CARE
Problem: Adult Inpatient Plan of Care  Goal: Plan of Care Review  Outcome: Ongoing, Progressing  Flowsheets (Taken 3/12/2021 1920)  Progress: improving  Plan of Care Reviewed With: patient  Outcome Summary: VSS .Patient denies pain,drsg to right ext dry and intact, doppler pulses. No S/S of discomfort or distress  Goal: Patient-Specific Goal (Individualized)  Outcome: Ongoing, Progressing  Goal: Absence of Hospital-Acquired Illness or Injury  Outcome: Ongoing, Progressing  Intervention: Identify and Manage Fall Risk  Recent Flowsheet Documentation  Taken 3/12/2021 1832 by Vivi Strickland RN  Safety Promotion/Fall Prevention:   activity supervised   clutter free environment maintained   assistive device/personal items within reach   fall prevention program maintained   safety round/check completed  Goal: Optimal Comfort and Wellbeing  Outcome: Ongoing, Progressing  Intervention: Provide Person-Centered Care  Recent Flowsheet Documentation  Taken 3/12/2021 1832 by Vivi Strickland RN  Trust Relationship/Rapport:   care explained   choices provided  Goal: Readiness for Transition of Care  Outcome: Ongoing, Progressing  Intervention: Mutually Develop Transition Plan  Recent Flowsheet Documentation  Taken 3/12/2021 1844 by Vivi Strickland RN  Transportation Anticipated: family or friend will provide  Transportation Concerns: car, none  Patient/Family Anticipated Services at Transition: none  Patient/Family Anticipates Transition to: home with family     Problem: Bleeding (Revascularization)  Goal: Absence of Bleeding  Outcome: Ongoing, Progressing     Problem: Bowel Elimination Impaired (Revascularization)  Goal: Effective Bowel Elimination  Outcome: Ongoing, Progressing     Problem: Infection (Revascularization)  Goal: Absence of Infection Signs and Symptoms  Outcome: Ongoing, Progressing     Problem: Ongoing Anesthesia Effects (Revascularization)  Goal: Anesthesia/Sedation Recovery  Outcome: Ongoing,  Progressing  Intervention: Optimize Anesthesia Recovery  Recent Flowsheet Documentation  Taken 3/12/2021 1832 by Vivi Strickland, RN  Safety Promotion/Fall Prevention:   activity supervised   clutter free environment maintained   assistive device/personal items within reach   fall prevention program maintained   safety round/check completed     Problem: Pain (Revascularization)  Goal: Acceptable Pain Control  Outcome: Ongoing, Progressing     Problem: Postoperative Nausea and Vomiting (Revascularization)  Goal: Nausea and Vomiting Relief  Outcome: Ongoing, Progressing     Problem: Postoperative Urinary Retention (Revascularization)  Goal: Effective Urinary Elimination  Outcome: Ongoing, Progressing     Problem: Tissue Perfusion Altered (Revascularization)  Goal: Effective Tissue Perfusion  Outcome: Ongoing, Progressing   Goal Outcome Evaluation:  Plan of Care Reviewed With: patient  Progress: improving  Outcome Summary: VSS .Patient denies pain,drsg to right ext dry and intact, doppler pulses. No S/S of discomfort or distress

## 2021-03-13 NOTE — PROGRESS NOTES
Name: Niraj Hassan ADMIT: 3/12/2021   : 1945  PCP: Joanna Salvador APRN    MRN: [id number] LOS: 1 days   AGE/SEX: 75 y.o. male  ROOM:  [place] [address]     CC: Postop day #1 status post distal bypass revision with endarterectomy and patch angioplasty  Interval History: Had some pain last night but much better today.  Subjective   Subjective     Review of Systems  Objective   Objective     Vitals:   Temp:  [97.5 °F (36.4 °C)-98.3 °F (36.8 °C)] 97.8 °F (36.6 °C)  Heart Rate:  [65-99] 82  Resp:  [8-20] 17  BP: (111-167)/(55-97) 134/55  Arterial Line BP: ()/(49-70) 123/53    No intake/output data recorded.    Physical Exam  Vascular: Surgical sites all look good.  Excellent posterior tibial, anterior tibial, and dorsalis pedis signals    Data Reviewed:  CBC    Results from last 7 days   Lab Units 03/10/21  1605   WBC 10*3/mm3 8.45   HEMOGLOBIN g/dL 13.6   PLATELETS 10*3/mm3 192     BMP   Results from last 7 days   Lab Units 03/10/21  1605   SODIUM mmol/L 139   POTASSIUM mmol/L 4.8   CHLORIDE mmol/L 104   CO2 mmol/L 23.5   BUN mg/dL 15   CREATININE mg/dL 0.81   GLUCOSE mg/dL 337*       Most notable findings include: No new labs today.    Active Hospital Problems:   Active Hospital Problems    Diagnosis  POA   • **Claudication (CMS/[facility]) [I73.9]  Yes   • PVD (peripheral vascular disease) (CMS/[facility]) [I73.9]  Yes      Resolved Hospital Problems   No resolved problems to display.      Assessment/Plan   Billin, Post Op Global  Assessment / Plan     Postop day #1 status post revision of right femoral to popliteal above-knee artery bypass with bypass graft endarterectomy and vein patch angioplasty.    Overall, surgical sites look good.  Excellent signals in the foot.  Pain has improved.  We will plan short-term follow-up in the office with Dr. Dharmesh Collazo.    Marciano Ramos MD  21  09:40 EST  Office Number [phone]               (2) cough or sneeze

## 2021-03-13 NOTE — DISCHARGE SUMMARY
"  Name: Niraj Hassan ADMIT: 3/12/2021   : 1945  PCP: Joanna Salvador APRN    MRN: [id number] LOS: 1 days   AGE/SEX: 75 y.o. male  Location: [facility]     Date of Admission: 3/12/2021  Date of Discharge:  3/13/2021    PCP: Joanna Salvador APRN      DISCHARGE DIAGNOSIS  Active Hospital Problems    Diagnosis  POA   • **Claudication (CMS/[facility]) [I73.9]  Yes   • PVD (peripheral vascular disease) (CMS/[facility]) [I73.9]  Yes      Resolved Hospital Problems   No resolved problems to display.       SECONDARY DIAGNOSES  Past Medical History:   Diagnosis Date   • CAD (coronary artery disease)    • Diabetes mellitus (CMS/[facility])    • GERD (gastroesophageal reflux disease)    • Hyperlipidemia    • Hypertension    • Myocardial infarction (CMS/[facility]) ,    • PVD (peripheral vascular disease) (CMS/[facility])    • Sciatic nerve pain        CONSULTS   Consults     No orders found for last 30 day(s).          PROCEDURES PERFORMED    Date: 3/12/2021, distal greater saphenous vein harvest for venous patch angioplasty and endarterectomy of distal bypass anastomosis    HOSPITAL COURSE  Patient is a 75 y.o. male presented to [facility] admitted for revision of his bypass.  Please see the admitting history and physical for further details.  Patient did well postoperatively.  He had some pain on the night of surgery but it was improved on the day of and he had excellent signals.  If he ambulates well today and tolerates diet and we will plan to discharge.      VITAL SIGNS  /55 (BP Location: Left arm, Patient Position: Sitting)   Pulse 82   Temp 97.8 °F (36.6 °C) (Oral)   Resp 17   Ht 172.7 cm (68\")   Wt 69.8 kg (153 lb 12.8 oz)   SpO2 97%   BMI 23.39 kg/m²   Objective  CONDITION ON DISCHARGE  Stable.      DISCHARGE DISPOSITION   Home or Self Care      DISCHARGE MEDICATIONS     Discharge Medications      New Medications      Instructions Start Date   HYDROcodone-acetaminophen 5-325 MG per tablet  Commonly known as: " NORCO   2 tablets, Oral, Every 6 Hours PRN         Changes to Medications      Instructions Start Date   atorvastatin 40 MG tablet  Commonly known as: LIPITOR  What changed: when to take this   TAKE ONE TABLET BY MOUTH DAILY      clopidogrel 75 MG tablet  Commonly known as: PLAVIX  What changed: additional instructions   75 mg, Oral, Daily      lansoprazole 15 MG capsule  Commonly known as: PREVACID  What changed: when to take this   15 mg, Oral, Daily      metoprolol succinate XL 50 MG 24 hr tablet  Commonly known as: TOPROL-XL  What changed: when to take this   TAKE ONE TABLET BY MOUTH DAILY         Continue These Medications      Instructions Start Date   cetirizine 10 MG tablet  Commonly known as: zyrTEC   10 mg, Oral, Daily      fish oil 1200 MG capsule capsule   1,200 mg, Oral, Daily With Breakfast      gabapentin 300 MG capsule  Commonly known as: NEURONTIN   300 mg, Oral, 3 Times Daily      glipizide 5 MG tablet  Commonly known as: Glucotrol   5 mg, Oral, Daily Before Supper      lisinopril-hydrochlorothiazide 20-12.5 MG per tablet  Commonly known as: PRINZIDE,ZESTORETIC   TAKE ONE TABLET BY MOUTH DAILY      Melatonin 10 MG capsule   10 mg, Oral, Nightly      metFORMIN 500 MG tablet  Commonly known as: GLUCOPHAGE   1,000 mg, Oral, 2 Times Daily With Meals      multivitamin capsule   1 capsule, Oral, Daily      nicotine 21 MG/24HR patch  Commonly known as: NICODERM CQ   1 patch, Transdermal, Every 24 Hours      Tart Swanson Advanced capsule   1 tablet, Oral, Daily      Triamcinolone Acetonide 55 MCG/ACT nasal inhaler  Commonly known as: NASACORT   2 sprays, Nasal, Daily              Future Appointments   Date Time Provider Department Center   4/5/2021  3:20 PM Shamir Manriquez MD [facility] None     Follow-up Information     Joanna Salvador, APRN .    Specialty: Nurse Practitioner  Contact information:  [address]  [place] [address] [place] [address] [phone]             Dharmesh Collazo MD  Follow up in 2 week(s).    Specialty: Vascular Surgery  Contact information:  [address] [place] [address]  [phone]                   TEST  RESULTS PENDING AT DISCHARGE      Billin, Post Op Global    Marciano Ramos MD  Office Number [phone]    21  10:47 EST

## 2021-03-13 NOTE — DISCHARGE INSTRUCTIONS
[facility]  [address] [place], [place], [place], [place]  [address], [address]  [phone]    Lower Extremity Bypass Surgery    Please refer to the following instructions for your post-procedure care.  Your surgeon and/or nurse will discuss any changes with you.    Activity  Avoid lifting more than five to 10 pounds (one or two gallons of milk) until after your first post-operative visit. You are encouraged to walk as much as you can. You can slowly return to normal activities during the month after your surgery. Avoid strenuous activity and heavy lifting until your doctor tells you it's OK. Avoid activities such as vacuuming, shoveling snow or swinging a golf club.    Do not drive until your doctor gives the OK and you are no longer taking prescription pain medications. It is also normal to have difficulty with sleep habits, eating, and bowel movements after surgery. These will go away with time.    Bathing/Showering  You may shower after you go home. Do not remove the bandages unless they begin to peel off. Do not soak in a bathtub, hot tub, or swim until the incision heals completely and the staples are removed.    Incision Care  **!** Please remove the white dressings from both sites on Sunday **!**    Clean your incision with mild soap and water. Pat the area dry with a clean towel. You do not need a bandage unless otherwise instructed. Do not apply any ointments  or creams to your incision. If you have open wounds you will be instructed on how  to care for them or a visiting nurse will be prescribed for you. If you have staples or sutures along your incision they will be removed at your post-op appointment.    Diet  Resume your normal diet. There are no special food restrictions following this procedure. A low fat/low cholesterol diet is recommended for all patients with  vascular disease.    Medications  Resume taking all of your medications unless your doctor or nurse  practitioner tells you not to. If your incision is causing pain, you may take over-the-counter pain relievers such as acetaminophen (Tylenol®).    If you were prescribed a stronger pain medication, please be aware these medications can cause nausea and constipation. Prevent nausea by taking the medication  with a snack or meal. Avoid constipation by drinking plenty of fluids and eating foods with a high amount of fiber, such as fruits, vegetables, and grains. Do not take  Tylenol® if you are taking prescription pain medications.    It is important that you understand the medications that have been prescribed to keep your bypass open. These  medications may include aspirin, clopidogrel (PLAVIX®), or warfarin (COUMADIN®). If you are unsure of which medication (s) have been prescribed, contact the office.    Please call us immediately for any of the following conditions  • Severe or worsening pain in your legs or feet while at rest or while walking  • Increased pain, redness, warmth, or drainage (pus) from your incision site(s)  • Fever of 101 degrees or higher  • The swelling in your leg with the bypass suddenly worsens and becomes more painful than when you were in the hospital  • If you have been instructed to feel your graft pulse then you should do so every day. If you can no longer feel this pulse, call the office immediately. Not all patients are given this instruction.    Leg swelling is common after leg bypass surgery. The swelling should improve over a few months following surgery. To improve the swelling, you may elevate your legs above the level of your heart while you are sitting or resting. Your surgeon or nurse practitioner may ask you to apply an ACE wrap or wear compression (LINDA) stockings to help to reduce swelling.    Reduce your risk of vascular disease  · Stop smoking if you smoke!  · Manage your cholesterol  · Maintain a desired weight  · Control your diabetes  · Keep your blood pressure down

## 2021-03-13 NOTE — PLAN OF CARE
Problem: Adult Inpatient Plan of Care  Goal: Plan of Care Review  Outcome: Ongoing, Progressing  Goal: Patient-Specific Goal (Individualized)  Outcome: Ongoing, Progressing  Goal: Absence of Hospital-Acquired Illness or Injury  Outcome: Ongoing, Progressing  Intervention: Identify and Manage Fall Risk  Recent Flowsheet Documentation  Taken 3/13/2021 0200 by Annalisa Mckeon RN  Safety Promotion/Fall Prevention:   activity supervised   assistive device/personal items within reach   clutter free environment maintained   fall prevention program maintained   nonskid shoes/slippers when out of bed   room organization consistent   safety round/check completed  Taken 3/12/2021 2200 by Annalisa Mckeon RN  Safety Promotion/Fall Prevention:   activity supervised   assistive device/personal items within reach   clutter free environment maintained   fall prevention program maintained   nonskid shoes/slippers when out of bed   room organization consistent   safety round/check completed  Taken 3/12/2021 2000 by Annalisa Mckeon RN  Safety Promotion/Fall Prevention:   activity supervised   assistive device/personal items within reach   clutter free environment maintained   fall prevention program maintained   nonskid shoes/slippers when out of bed   room organization consistent   safety round/check completed  Intervention: Prevent Skin Injury  Recent Flowsheet Documentation  Taken 3/13/2021 0200 by Annalisa Mckeon RN  Body Position: position changed independently  Taken 3/12/2021 2200 by Annalisa Mckeon RN  Body Position: position changed independently  Taken 3/12/2021 2000 by Annalisa Mckeon RN  Body Position: position changed independently  Intervention: Prevent and Manage VTE (venous thromboembolism) Risk  Recent Flowsheet Documentation  Taken 3/12/2021 2000 by Annalisa Mckeon RN  VTE Prevention/Management:   bilateral   dorsiflexion/plantar flexion performed  Intervention: Prevent Infection  Recent Flowsheet  Documentation  Taken 3/13/2021 0200 by Annalisa Mckeon RN  Infection Prevention:   single patient room provided   rest/sleep promoted  Taken 3/12/2021 2200 by Annalisa Mckeon RN  Infection Prevention:   visitors restricted/screened   single patient room provided   rest/sleep promoted  Taken 3/12/2021 2000 by Annalisa Mckeon RN  Infection Prevention:   rest/sleep promoted   single patient room provided  Goal: Optimal Comfort and Wellbeing  Outcome: Ongoing, Progressing  Intervention: Provide Person-Centered Care  Recent Flowsheet Documentation  Taken 3/12/2021 2000 by Annalisa Mckeon RN  Trust Relationship/Rapport:   care explained   choices provided   questions answered  Goal: Readiness for Transition of Care  Outcome: Ongoing, Progressing     Problem: Bleeding (Revascularization)  Goal: Absence of Bleeding  Outcome: Ongoing, Progressing     Problem: Bowel Elimination Impaired (Revascularization)  Goal: Effective Bowel Elimination  Outcome: Ongoing, Progressing     Problem: Infection (Revascularization)  Goal: Absence of Infection Signs and Symptoms  Outcome: Ongoing, Progressing     Problem: Ongoing Anesthesia Effects (Revascularization)  Goal: Anesthesia/Sedation Recovery  Outcome: Ongoing, Progressing  Intervention: Optimize Anesthesia Recovery  Recent Flowsheet Documentation  Taken 3/13/2021 0200 by Annalisa Mckeon RN  Safety Promotion/Fall Prevention:   activity supervised   assistive device/personal items within reach   clutter free environment maintained   fall prevention program maintained   nonskid shoes/slippers when out of bed   room organization consistent   safety round/check completed  Taken 3/12/2021 2200 by Annalisa Mckeon RN  Safety Promotion/Fall Prevention:   activity supervised   assistive device/personal items within reach   clutter free environment maintained   fall prevention program maintained   nonskid shoes/slippers when out of bed   room organization consistent   safety round/check  completed  Taken 3/12/2021 2000 by Annalisa Mckeon, RN  Safety Promotion/Fall Prevention:   activity supervised   assistive device/personal items within reach   clutter free environment maintained   fall prevention program maintained   nonskid shoes/slippers when out of bed   room organization consistent   safety round/check completed     Problem: Pain (Revascularization)  Goal: Acceptable Pain Control  Outcome: Ongoing, Progressing     Problem: Postoperative Nausea and Vomiting (Revascularization)  Goal: Nausea and Vomiting Relief  Outcome: Ongoing, Progressing     Problem: Postoperative Urinary Retention (Revascularization)  Goal: Effective Urinary Elimination  Outcome: Ongoing, Progressing     Problem: Tissue Perfusion Altered (Revascularization)  Goal: Effective Tissue Perfusion  Outcome: Ongoing, Progressing  Intervention: Optimize Tissue Perfusion  Recent Flowsheet Documentation  Taken 3/13/2021 0200 by Annalisa Mckeon, RN  Body Position: position changed independently  Taken 3/12/2021 2200 by Annalisa Mckeon RN  Body Position: position changed independently  Taken 3/12/2021 2000 by Annalisa Mckeon, RN  Body Position: position changed independently   Goal Outcome Evaluation:   VSS, A&Ox4. Dressing to RLE CDI. Doppler pulses. No c/o pain or discomfort. Declined nicotine patch. Kline catheter intact draining clear, yellow urine. Will continue to monitor.

## 2021-03-14 NOTE — OUTREACH NOTE
Prep Survey      Responses   [facility] facility patient discharged from?  [facility]   Is LACE score < 7 ?  No   Emergency Room discharge w/ pulse ox?  No   Eligibility  [facility]   Date of Admission  03/12/21   Date of Discharge  03/13/21   Discharge diagnosis  Claudication/PVD-endarterectomy this visit   Does the patient have one of the following disease processes/diagnoses(primary or secondary)?  General Surgery   Does the patient have Home health ordered?  No   Is there a DME ordered?  No   Prep survey completed?  Yes          Tahmina Lu RN

## 2021-03-15 ENCOUNTER — TRANSITIONAL CARE MANAGEMENT TELEPHONE ENCOUNTER (OUTPATIENT)
Dept: CALL CENTER | Facility: HOSPITAL | Age: 76
End: 2021-03-15

## 2021-03-15 LAB
ACT BLD: 125 SECONDS (ref 82–152)
ACT BLD: 131 SECONDS (ref 82–152)
ACT BLD: 252 SECONDS (ref 82–152)
BASE EXCESS BLDA CALC-SCNC: -2 MMOL/L (ref -5–5)
CA-I BLDA-SCNC: ABNORMAL MMOL/L
CO2 BLDA-SCNC: 24 MMOL/L (ref 24–29)
GLUCOSE BLDC GLUCOMTR-MCNC: 190 MG/DL (ref 70–130)
HCO3 BLDA-SCNC: 22.9 MMOL/L (ref 22–26)
HCT VFR BLDA CALC: 35 % (ref 38–51)
HGB BLDA-MCNC: 11.9 G/DL (ref 12–17)
PCO2 BLDA: 37.5 MM HG (ref 35–45)
PH BLDA: 7.39 PH UNITS (ref 7.35–7.6)
PO2 BLDA: 277 MMHG (ref 80–105)
POTASSIUM BLDA-SCNC: 4.3 MMOL/L (ref 3.5–4.9)
SAO2 % BLDA: 100 % (ref 95–98)

## 2021-03-15 NOTE — OUTREACH NOTE
Call Center TCM Note      Responses   [facility] patient discharged from?  [place]   Does the patient have one of the following disease processes/diagnoses(primary or secondary)?  General Surgery   TCM attempt successful?  No   Unsuccessful attempts  Attempt 2          Therese Dennis MA    3/15/2021, 17:43 EDT

## 2021-03-15 NOTE — PROGRESS NOTES
Case Management Discharge Note      Final Note: Pt discharged home ,no known needs.  PRECIOUS P[place] RN         Selected Continued Care - Discharged on 3/13/2021 Admission date: 3/12/2021 - Discharge disposition: Home or Self Care    Destination    No services have been selected for the patient.              Durable Medical Equipment    No services have been selected for the patient.              Dialysis/Infusion    No services have been selected for the patient.              Home Medical Care    No services have been selected for the patient.              Therapy    No services have been selected for the patient.              Community Resources    No services have been selected for the patient.                  Transportation Services  Private: Car    Final Discharge Disposition Code: 01 - home or self-care

## 2021-03-15 NOTE — OUTREACH NOTE
Call Center TCM Note      Responses   [facility] patient discharged from?  [place]   Does the patient have one of the following disease processes/diagnoses(primary or secondary)?  General Surgery   TCM attempt successful?  No   Unsuccessful attempts  Attempt 1          Therese Dennis MA    3/15/2021, 14:03 EDT

## 2021-03-16 ENCOUNTER — TRANSITIONAL CARE MANAGEMENT TELEPHONE ENCOUNTER (OUTPATIENT)
Dept: CALL CENTER | Facility: HOSPITAL | Age: 76
End: 2021-03-16

## 2021-03-16 NOTE — OUTREACH NOTE
Call Center TCM Note      Responses   [facility] patient discharged from?  [place]   Does the patient have one of the following disease processes/diagnoses(primary or secondary)?  General Surgery   TCM attempt successful?  Yes   Discharge diagnosis  Claudication/PVD-endarterectomy this visit   Meds reviewed with patient/caregiver?  Yes   Is the patient having any side effects they believe may be caused by any medication additions or changes?  No   Does the patient have all medications related to this admission filled (includes all antibiotics, pain medications, etc.)  Yes   Is the patient taking all medications as directed (includes completed medication regime)?  Yes   Does the patient have a follow up appointment scheduled with their surgeon?  Yes   Has the patient kept scheduled appointments due by today?  Yes   Has home health visited the patient within 72 hours of discharge?  N/A   Psychosocial issues?  No   Did the patient receive a copy of their discharge instructions?  Yes   Nursing interventions  Reviewed instructions with patient   What is the patient's perception of their health status since discharge?  Improving   Is the patient /caregiver able to teach back basic post-op care?  Continue use of incentive spirometry at least 1 week post discharge, No tub bath, swimming, or hot tub until instructed by MD, Do not remove steri-strips, Drive as instructed by MD in discharge instructions, Practice 'cough and deep breath', Take showers only when approved by MD-sponge bathe until then, Keep incision areas clean,dry and protected, Lifting as instructed by MD in discharge instructions   Is the patient/caregiver able to teach back signs and symptoms of incisional infection?  Increased redness, swelling or pain at the incisonal site, Pus or odor from incision, Fever, Increased drainage or bleeding, Incisional warmth   Is the patient/caregiver able to teach back steps to recovery at home?  Set small,  achievable goals for return to baseline health, Practice good oral hygiene, Eat a well-balance diet, Rest and rebuild strength, gradually increase activity, Weigh daily, Make a list of questions for surgeon's appointment   If the patient is a current smoker, are they able to teach back resources for cessation?  Not a smoker   Is the patient/caregiver able to teach back the hierarchy of who to call/visit for symptoms/problems? PCP, Specialist, Home health nurse, Urgent Care, ED, 911  Yes   TCM call completed?  Yes   Wrap up additional comments  Pt doing well s/p RLE bypass graft. No questions or concerns. Pt has Vasreji GARVIN fwp scheduled. and TCM FWP with PCP Dr Montez is 03/25/2021          Therese Dennis MA    3/16/2021, 11:40 EDT

## 2021-03-17 ENCOUNTER — OFFICE VISIT (OUTPATIENT)
Dept: FAMILY MEDICINE CLINIC | Facility: CLINIC | Age: 76
End: 2021-03-17

## 2021-03-17 VITALS
DIASTOLIC BLOOD PRESSURE: 70 MMHG | WEIGHT: 154.4 LBS | BODY MASS INDEX: 23.4 KG/M2 | SYSTOLIC BLOOD PRESSURE: 108 MMHG | OXYGEN SATURATION: 99 % | HEART RATE: 84 BPM | HEIGHT: 68 IN | TEMPERATURE: 96.4 F

## 2021-03-17 DIAGNOSIS — F17.210 CIGARETTE NICOTINE DEPENDENCE WITHOUT COMPLICATION: ICD-10-CM

## 2021-03-17 DIAGNOSIS — I73.9 PVD (PERIPHERAL VASCULAR DISEASE) (HCC): ICD-10-CM

## 2021-03-17 DIAGNOSIS — Z09 HOSPITAL DISCHARGE FOLLOW-UP: Primary | ICD-10-CM

## 2021-03-17 PROCEDURE — 99496 TRANSJ CARE MGMT HIGH F2F 7D: CPT | Performed by: NURSE PRACTITIONER

## 2021-03-17 PROCEDURE — 1111F DSCHRG MED/CURRENT MED MERGE: CPT | Performed by: NURSE PRACTITIONER

## 2021-03-17 NOTE — PROGRESS NOTES
"Answers for HPI/ROS submitted by the patient on 3/14/2021  What is the primary reason for your visit?: Other  Please describe your symptoms.: Follow up after leg surgery on 3/12. Surgeon said to schedule with PCP.  Have you had these symptoms before?: No  How long have you been having these symptoms?: 1-4 days  Please describe any probable cause for these symptoms. : Surgery    Chief Complaint  Hospital Follow Up Visit (Had admission and leg sx 3/12)    Subjective          Niraj Hassan presents to [facility]  History of Present Illness   Patient is here for follow-up from hospitalization for right femoropopliteal revascularization with deep graft placed in right leg after previous graft was found to have a bigger decrease in blood flow than originally thought.    Patient reports that he is recovering well.  He is improving day by day.  He is only taking hydrocodone now at at bedtime.  He does not really have much pain during the day.    He has noticed an overall reduction in his foot and leg pain at night as well and thinks that some of the ongoing pain he has been having at rest might now be resolving.    Patient understands that much of this could be caused by tobacco use.  At last visit we had a long discussion about need to begin serious tobacco abstinence and he was given patches and we also discussed using lozenges or gum in addition as needed for breakthrough cravings.  He smokes his first cigarette in the morning with his first cup of coffee and this is the one that he finds hardest to give up.  However he is now down to 2 cigs per day with lozenges.  He finds these much more helpful than patches.    He denies any other health concerns currently.    Objective   Vital Signs:   /70   Pulse 84   Temp 96.4 °F (35.8 °C)   Ht 172.7 cm (68\")   Wt 70 kg (154 lb 6.4 oz)   SpO2 99%   BMI 23.48 kg/m²     Physical Exam  Vitals and nursing note reviewed.   Constitutional:     "   General: He is not in acute distress.     Appearance: He is well-developed. He is not ill-appearing or diaphoretic.   HENT:      Head: Normocephalic and atraumatic.   Eyes:      General:         Right eye: No discharge.         Left eye: No discharge.      Conjunctiva/sclera: Conjunctivae normal.   Cardiovascular:      Rate and Rhythm: Normal rate and regular rhythm.      Pulses: Normal pulses.   Pulmonary:      Effort: Pulmonary effort is normal.      Breath sounds: Normal breath sounds.   Abdominal:      General: Bowel sounds are normal.      Palpations: Abdomen is soft.      Tenderness: There is no abdominal tenderness.   Musculoskeletal:         General: No deformity.      Comments: Gait smooth and steady   Skin:     General: Skin is warm and dry.      Comments: Right medial leg with staples intact to upper thigh and right lower third of leg.  There are no signs and symptoms of complications.  The incision seems to be healing well and is well approximated   Neurological:      General: No focal deficit present.      Mental Status: He is alert and oriented to person, place, and time.   Psychiatric:         Mood and Affect: Mood normal.         Behavior: Behavior normal.        Result Review :                 Assessment and Plan {CC Problem List  Visit Diagnosis  ROS  Review (Popup)  [facility]  Quality  BestPractice  Medications  SmartSets  SnapShot Encounters  Media :23}   Diagnoses and all orders for this visit:    1. Hospital discharge follow-up (Primary)    2. PVD (peripheral vascular disease) (CMS/HCC)    3. Cigarette nicotine dependence without complication      We discussed hospitalization and patient's understanding of plan of care ongoing.  He has a follow-up with vascular for staple removal and evaluation.  He is feeling hopeful that his leg pain has improved with surgery and will continue to improve.    We discussed continued tobacco decrease and cessation.  He is down to 2  cigarettes/day and likely can stop altogether soon.  We discussed the importance of this and the health consequences of continued smoking.    He is weaning off of his pain medicine and not having any constipation related to the pain medication.  He has been able to move around well.    We discussed signs and symptoms of complications that would require follow-up evaluation.    We discussed continued medications and reconciled his med list.    We will need follow-up A1c soon as well as diabetic care.      Follow Up   No follow-ups on file.  Patient was given instructions and counseling regarding his condition or for health maintenance advice. Please see specific information pulled into the AVS if appropriate.

## 2021-03-22 ENCOUNTER — READMISSION MANAGEMENT (OUTPATIENT)
Dept: CALL CENTER | Facility: HOSPITAL | Age: 76
End: 2021-03-22

## 2021-03-22 NOTE — OUTREACH NOTE
General Surgery Week 2 Survey      Responses   [facility] patient discharged from?  [place]   Does the patient have one of the following disease processes/diagnoses(primary or secondary)?  General Surgery   Week 2 attempt successful?  No   Unsuccessful attempts  Attempt 1          Glenda Rinaldi RN

## 2021-03-23 ENCOUNTER — READMISSION MANAGEMENT (OUTPATIENT)
Dept: CALL CENTER | Facility: HOSPITAL | Age: 76
End: 2021-03-23

## 2021-03-23 RX ORDER — GLIPIZIDE 5 MG/1
TABLET ORAL
Qty: 30 TABLET | Refills: 3 | Status: SHIPPED | OUTPATIENT
Start: 2021-03-23 | End: 2021-05-24 | Stop reason: SDUPTHER

## 2021-03-23 NOTE — OUTREACH NOTE
General Surgery Week 2 Survey      Responses   [facility] [place] by [facility] patient discharged from?  [place]   Does the patient have one of the following disease processes/diagnoses(primary or secondary)?  General Surgery   Week 2 attempt successful?  Yes   Call start time  1102   Call end time  1106   Discharge diagnosis  Claudication/PVD-endarterectomy this visit   Is patient permission given to speak with other caregiver?  Yes   List who call center can speak with  sister   Meds reviewed with patient/caregiver?  Yes   Is the patient taking all medications as directed (includes completed medication regime)?  Yes   Does the patient have a follow up appointment scheduled with their surgeon?  Yes   Has the patient kept scheduled appointments due by today?  Yes   Psychosocial issues?  No   Comments  Pt is walking independently, says that the leg gets easier to walk on daily. No pain, he reports. Incision has staples in place, no s/sx of infection.Small amt of bleeding at bottom on incision at ankle but that resolved. Bruising is present on thigh. Sleeping well.    What is the patient's perception of their health status since discharge?  Improving   Nursing interventions  Nurse provided patient education   Is the patient /caregiver able to teach back basic post-op care?  Continue use of incentive spirometry at least 1 week post discharge, Practice 'cough and deep breath', Take showers only when approved by MD-sponge bathe until then, No tub bath, swimming, or hot tub until instructed by MD, Keep incision areas clean,dry and protected   Is the patient/caregiver able to teach back signs and symptoms of incisional infection?  Increased redness, swelling or pain at the incisonal site, Increased drainage or bleeding   Is the patient/caregiver able to teach back steps to recovery at home?  Rest and rebuild strength, gradually increase activity, Set small, achievable goals for return to baseline health   If the patient is a current  smoker, are they able to teach back resources for cessation?  Not a smoker   Is the patient/caregiver able to teach back the hierarchy of who to call/visit for symptoms/problems? PCP, Specialist, Home health nurse, Urgent Care, ED, 911  Yes   Week 2 call completed?  Yes          eBtte Sandoval, RN

## 2021-03-27 DIAGNOSIS — I10 ESSENTIAL HYPERTENSION: ICD-10-CM

## 2021-03-27 DIAGNOSIS — E78.2 MIXED HYPERLIPIDEMIA: Primary | ICD-10-CM

## 2021-03-30 RX ORDER — LISINOPRIL AND HYDROCHLOROTHIAZIDE 20; 12.5 MG/1; MG/1
1 TABLET ORAL DAILY
Qty: 90 TABLET | Refills: 1 | Status: SHIPPED | OUTPATIENT
Start: 2021-03-30 | End: 2021-08-18

## 2021-03-31 ENCOUNTER — READMISSION MANAGEMENT (OUTPATIENT)
Dept: CALL CENTER | Facility: HOSPITAL | Age: 76
End: 2021-03-31

## 2021-03-31 NOTE — OUTREACH NOTE
General Surgery Week 3 Survey      Responses   [facility] patient discharged from?  [place]   Does the patient have one of the following disease processes/diagnoses(primary or secondary)?  General Surgery   Week 3 attempt successful?  No   Unsuccessful attempts  Attempt 1          Elva Smith LPN

## 2021-04-05 ENCOUNTER — READMISSION MANAGEMENT (OUTPATIENT)
Dept: CALL CENTER | Facility: HOSPITAL | Age: 76
End: 2021-04-05

## 2021-04-05 ENCOUNTER — OFFICE VISIT (OUTPATIENT)
Dept: CARDIOLOGY | Facility: CLINIC | Age: 76
End: 2021-04-05

## 2021-04-05 VITALS
HEIGHT: 68 IN | BODY MASS INDEX: 23.04 KG/M2 | SYSTOLIC BLOOD PRESSURE: 132 MMHG | HEART RATE: 97 BPM | DIASTOLIC BLOOD PRESSURE: 70 MMHG | WEIGHT: 152 LBS | OXYGEN SATURATION: 99 %

## 2021-04-05 DIAGNOSIS — I25.5 ISCHEMIC CARDIOMYOPATHY: ICD-10-CM

## 2021-04-05 DIAGNOSIS — I70.421: ICD-10-CM

## 2021-04-05 DIAGNOSIS — I73.9 PERIPHERAL ARTERIAL DISEASE (HCC): ICD-10-CM

## 2021-04-05 DIAGNOSIS — I25.119 CORONARY ARTERY DISEASE INVOLVING NATIVE CORONARY ARTERY OF NATIVE HEART WITH ANGINA PECTORIS (HCC): Primary | ICD-10-CM

## 2021-04-05 PROCEDURE — 99214 OFFICE O/P EST MOD 30 MIN: CPT | Performed by: INTERNAL MEDICINE

## 2021-04-05 PROCEDURE — 93000 ELECTROCARDIOGRAM COMPLETE: CPT | Performed by: INTERNAL MEDICINE

## 2021-04-05 NOTE — PROGRESS NOTES
Niraj Hassan  1945  Date of Office Visit:04/05/21  Encounter Provider: Shamir Manriquez MD  Place of Service: [facility] CARDIOLOGY      CHIEF COMPLAINT:   Claudication  Peripheral arterial disease  Abnormal CAT  Prior fem-pop bypass    HISTORY OF PRESENT ILLNESS:  I had the pleasure of seeing Mr. Niraj Hassan in f/u today. As you well know, Mr. Hassan is a pleasant 75-year-old male with a medical history of prior lateral wall myocardial infarction, angioplasty to circumflex, repeat catheterization in 2006 with just mild disease of the circumflex and  of the RCA with left to right collaterals. He was noted to have a circumflex infarction following that and his circumflex was noted to be occluded. Unfortunately, due to the timing of this he was treated medically.      The patient's FEM-POP is actually on the right, and he presented to me initially secondary to left lower extremity discomfort with walking that was consistent with claudication. He had a duplex at that time that showed obstructive disease towards the popliteal artery.  Pletal was supposed to be started, however, his pharmacist had questions and primary care said that he was not to take that. I do no really have additional details on that, but he states that his symptoms on his left resolved by wearing a copper band on his leg.      He then reported right lower extremity discomfort and underwent a CTA of his lower extremities.  Following this CTA he was referred to the vascular surgery team secondary to a severe focal distal intragraft stenosis of the right femoral-popliteal bypass graft and severe disease in the last in the left SFA.  The patient had severe recurrent distal bypass graft stenosis of the right lower extremity bypass graft and subsequently underwent revision of the right femoral to popliteal above-the-knee artery bypass with endarterectomy and vein patch angioplasty in March 2021.  He  noticed the claudication has gone away but numbness and tingling of his right foot is still there secondary to his lumbosacral spine disease    Review of Systems   Constitutional: Negative for fever and malaise/fatigue.   HENT: Negative for nosebleeds and sore throat.    Eyes: Negative for blurred vision and double vision.   Cardiovascular: Negative for chest pain, claudication, palpitations and syncope.   Respiratory: Negative for cough, shortness of breath and snoring.    Endocrine: Negative for cold intolerance, heat intolerance and polydipsia.   Skin: Negative for itching, poor wound healing and rash.   Musculoskeletal: Negative for joint pain, joint swelling, muscle weakness and myalgias.   Gastrointestinal: Negative for abdominal pain, melena, nausea and vomiting.   Neurological: Negative for light-headedness, loss of balance, seizures, vertigo and weakness.   Psychiatric/Behavioral: Negative for altered mental status and depression.        Past Medical History:   Diagnosis Date   • CAD (coronary artery disease)    • Diabetes mellitus (CMS/[facility])    • GERD (gastroesophageal reflux disease)    • Hyperlipidemia    • Hypertension    • Myocardial infarction (CMS/[facility]) 1996, 2015   • PVD (peripheral vascular disease) (CMS/[facility])    • Sciatic nerve pain        The following portions of the patient's history were reviewed and updated as appropriate: Social history , Family history and Surgical history     Current Outpatient Medications on File Prior to Visit   Medication Sig Dispense Refill   • atorvastatin (LIPITOR) 40 MG tablet TAKE ONE TABLET BY MOUTH DAILY (Patient taking differently: Take 40 mg by mouth Every Night.) 90 tablet 1   • cetirizine (ZyrTEC) 10 MG tablet Take 10 mg by mouth Daily.     • clopidogrel (PLAVIX) 75 MG tablet Take 1 tablet by mouth Daily. 90 tablet 0   • glipizide (GLUCOTROL) 5 MG tablet TAKE ONE TABLET BY MOUTH DAILY BEFORE SUPPER 30 tablet 3   • lansoprazole (PREVACID) 15 MG capsule Take 1  "capsule by mouth Daily. (Patient taking differently: Take 15 mg by mouth Every Morning.) 90 capsule 1   • lisinopril-hydrochlorothiazide (PRINZIDE,ZESTORETIC) 20-12.5 MG per tablet Take 1 tablet by mouth Daily. 90 tablet 1   • Melatonin 10 MG capsule Take 10 mg by mouth Every Night.     • metFORMIN (GLUCOPHAGE) 500 MG tablet Take 2 tablets by mouth 2 (Two) Times a Day With Meals. 360 tablet 0   • metoprolol succinate XL (TOPROL-XL) 50 MG 24 hr tablet TAKE ONE TABLET BY MOUTH DAILY (Patient taking differently: Take 50 mg by mouth Every Morning.) 90 tablet 1   • Misc Natural Products (TART CHERRY ADVANCED) capsule Take 1 tablet by mouth Daily.     • Multiple Vitamin (MULTIVITAMIN) capsule Take 1 capsule by mouth Daily.     • nicotine (NICODERM CQ) 21 MG/24HR patch Place 1 patch on the skin as directed by provider Daily. 28 patch 1   • Omega-3 Fatty Acids (FISH OIL) 1200 MG capsule capsule Take 1,200 mg by mouth Daily With Breakfast.     • Triamcinolone Acetonide (NASACORT) 55 MCG/ACT nasal inhaler 2 sprays into each nostril daily.     • [DISCONTINUED] gabapentin (NEURONTIN) 300 MG capsule Take 1 capsule by mouth 3 (Three) Times a Day. 90 capsule 2   • [DISCONTINUED] HYDROcodone-acetaminophen (NORCO) 5-325 MG per tablet Take 2 tablets by mouth Every 6 (Six) Hours As Needed for Moderate Pain . 24 tablet 0     No current facility-administered medications on file prior to visit.       No Known Allergies    Vitals:    04/05/21 1515   BP: 132/70   BP Location: Left arm   Pulse: 97   SpO2: 99%   Weight: 68.9 kg (152 lb)   Height: 172.7 cm (68\")     Physical Exam  Constitutional:       Appearance: He is well-developed.   HENT:      Head: Normocephalic and atraumatic.   Eyes:      General: No scleral icterus.     Conjunctiva/sclera: Conjunctivae normal.   Neck:      Thyroid: No thyromegaly.      Vascular: Normal carotid pulses. No carotid bruit, hepatojugular reflux or JVD.      Trachea: No tracheal deviation.   "   Cardiovascular:      Rate and Rhythm: Normal rate and regular rhythm.      Pulses:           Carotid pulses are 2+ on the right side and 2+ on the left side.       Radial pulses are 2+ on the right side and 2+ on the left side.        Femoral pulses are 2+ on the right side and 2+ on the left side.       Dorsalis pedis pulses are 0 on the right side and 1+ on the left side.        Posterior tibial pulses are 1+ on the right side and 1+ on the left side.      Heart sounds: No murmur heard.   No friction rub. No gallop.    Pulmonary:      Effort: No respiratory distress.      Breath sounds: Normal breath sounds. No decreased breath sounds, wheezing, rhonchi or rales.   Chest:      Chest wall: No tenderness.   Abdominal:      General: Bowel sounds are normal. There is no distension.      Palpations: Abdomen is soft.      Tenderness: There is no abdominal tenderness. There is no rebound.   Musculoskeletal:         General: No deformity.      Cervical back: Normal range of motion and neck supple.   Skin:     Findings: No erythema or rash.          Neurological:      Mental Status: He is alert and oriented to person, place, and time.      Sensory: No sensory deficit.   Psychiatric:         Behavior: Behavior normal.         No results found for: CBC  No results found for: CMP  No components found for: LIPID  No results found for: BMP      ECG 12 Lead    Date/Time: 4/5/2021 3:52 PM  Performed by: Shamir Manriquez MD  Authorized by: Shamir Manriquez MD   Comparison: compared with previous ECG from 9/3/2020  Similar to previous ECG  Rhythm: sinus rhythm  Rate: normal  Conduction: non-specific intraventricular conduction delay    Clinical impression: abnormal EKG  Comments: Inferior infarction             3/2/2021  · Myocardial perfusion imaging indicates a medium-sized infarct located in the lateral wall with no significant ischemia noted.  · Left ventricular ejection fraction is mildly reduced. (Calculated EF  = 40%). Abnormal LV wall motion consistent with akinesis.    1/11/19  · Mild obstructive disease involving the right superficial femoral/popliteal arterial system.  · Severe obstructive disease involving the left superficial femoral/popliteal arterial system.    DISCUSSION/SUMMARY  73-year-old male with medical history of peripheral arterial disease with right-sided fem-pop bypass, CAD with chronic total occlusions of the circumflex and RCA, ischemic cardiomyopathy, who presents back for follow-up.  He underwent vascular surgery as above and states that his claudication of his right lower extremity has resolved but he still does have numbness and tingling of right foot that he thinks is more secondary to lumbosacral spine disease.  He had no chest pain or issues around the time of his vascular surgery in the setting of his previously documented  of the circumflex and RCA.    1.  Coronary disease with previously documented  of the circumflex and RCA: No angina  -Tolerated his surgical procedure well without any significant chest pain  -Continue Plavix.  This can be stopped for a short period of time for epidural in the future if necessary.  -Continue atorvastatin 40 mg p.o. nightly.  Tolerating this well with no myalgias.  - Transaminases have been normal.  LDL below 70 on review of lab work.    2.  Peripheral arterial disease: Anatomy as above.  Continue to follow with vascular surgery.  Continue Plavix and atorvastatin therapy.  No significant claudication reported    3.  Diabetes mellitus: Per primary continue Metformin.  No GI issues on that therapy.

## 2021-04-05 NOTE — OUTREACH NOTE
General Surgery Week 3 Survey      Responses   [facility] patient discharged from?  [place]   Does the patient have one of the following disease processes/diagnoses(primary or secondary)?  General Surgery   Week 3 attempt successful?  Yes   Call start time  0916   Call end time  0921   Discharge diagnosis  Claudication/PVD-endarterectomy this visit   Is patient permission given to speak with other caregiver?  Yes   List who call center can speak with  sister   Meds reviewed with patient/caregiver?  Yes   Is the patient having any side effects they believe may be caused by any medication additions or changes?  No   Does the patient have all medications related to this admission filled (includes all antibiotics, pain medications, etc.)  Yes   Is the patient taking all medications as directed (includes completed medication regime)?  Yes   Medication comments  He reports no pain.    Does the patient have a follow up appointment scheduled with their surgeon?  Yes   Has the patient kept scheduled appointments due by today?  Yes   Comments  He has seen his surgeon the staples have been removed.    Did the patient receive a copy of their discharge instructions?  Yes   Nursing interventions  Reviewed instructions with patient, Educated on MyChart   What is the patient's perception of their health status since discharge?  Improving [He states he is well, staples have been removed. ]   Nursing interventions  Nurse provided patient education   Is the patient /caregiver able to teach back basic post-op care?  Continue use of incentive spirometry at least 1 week post discharge, Practice 'cough and deep breath', Take showers only when approved by MD-sponge bathe until then, No tub bath, swimming, or hot tub until instructed by MD, Keep incision areas clean,dry and protected   Is the patient/caregiver able to teach back signs and symptoms of incisional infection?  Increased redness, swelling or pain at the incisonal site,  Increased drainage or bleeding   Is the patient/caregiver able to teach back steps to recovery at home?  Rest and rebuild strength, gradually increase activity, Set small, achievable goals for return to baseline health   If the patient is a current smoker, are they able to teach back resources for cessation?  Not a smoker   Is the patient/caregiver able to teach back the hierarchy of who to call/visit for symptoms/problems? PCP, Specialist, Home health nurse, Urgent Care, ED, 911  Yes   Week 3 call completed?  Yes   Revoked  No further contact(revokes)-requires comment   Is the patient interested in additional calls from an ambulatory ?  NOTE:  applies to high risk patients requiring additional follow-up.  No   Wrap up additional comments  He is doing well no questions.           Jennifer Martinez RN

## 2021-04-16 ENCOUNTER — HOSPITAL ENCOUNTER (OUTPATIENT)
Dept: GENERAL RADIOLOGY | Facility: HOSPITAL | Age: 76
Discharge: HOME | End: 2021-04-16

## 2021-04-16 ENCOUNTER — HOSPITAL ENCOUNTER (OUTPATIENT)
Dept: PAIN MEDICINE | Facility: HOSPITAL | Age: 76
Discharge: HOME | End: 2021-04-16

## 2021-04-16 ENCOUNTER — ANESTHESIA EVENT (OUTPATIENT)
Dept: PAIN MEDICINE | Facility: HOSPITAL | Age: 76
End: 2021-04-16

## 2021-04-16 ENCOUNTER — ANESTHESIA (OUTPATIENT)
Dept: PAIN MEDICINE | Facility: HOSPITAL | Age: 76
End: 2021-04-16

## 2021-04-16 VITALS
HEART RATE: 85 BPM | BODY MASS INDEX: 23.49 KG/M2 | HEIGHT: 68 IN | TEMPERATURE: 97.3 F | OXYGEN SATURATION: 98 % | SYSTOLIC BLOOD PRESSURE: 111 MMHG | DIASTOLIC BLOOD PRESSURE: 70 MMHG | WEIGHT: 155 LBS | RESPIRATION RATE: 16 BRPM

## 2021-04-16 DIAGNOSIS — R20.0 NUMBNESS OF RIGHT FOOT: ICD-10-CM

## 2021-04-16 DIAGNOSIS — R52 PAIN: ICD-10-CM

## 2021-04-16 DIAGNOSIS — R20.0 RIGHT LEG NUMBNESS: ICD-10-CM

## 2021-04-16 DIAGNOSIS — M54.9 CHRONIC BACK PAIN, UNSPECIFIED BACK LOCATION, UNSPECIFIED BACK PAIN LATERALITY: Primary | ICD-10-CM

## 2021-04-16 DIAGNOSIS — G89.29 CHRONIC BACK PAIN, UNSPECIFIED BACK LOCATION, UNSPECIFIED BACK PAIN LATERALITY: Primary | ICD-10-CM

## 2021-04-16 LAB — GLUCOSE BLDC GLUCOMTR-MCNC: 162 MG/DL (ref 70–130)

## 2021-04-16 PROCEDURE — 25010000002 MIDAZOLAM PER 1 MG: Performed by: ANESTHESIOLOGY

## 2021-04-16 PROCEDURE — 0 IOPAMIDOL 41 % SOLUTION: Performed by: ANESTHESIOLOGY

## 2021-04-16 PROCEDURE — 82962 GLUCOSE BLOOD TEST: CPT

## 2021-04-16 PROCEDURE — 77003 FLUOROGUIDE FOR SPINE INJECT: CPT

## 2021-04-16 PROCEDURE — 25010000002 METHYLPREDNISOLONE PER 80 MG: Performed by: ANESTHESIOLOGY

## 2021-04-16 RX ORDER — SODIUM CHLORIDE 0.9 % (FLUSH) 0.9 %
3-10 SYRINGE (ML) INJECTION AS NEEDED
Status: DISCONTINUED | OUTPATIENT
Start: 2021-04-16 | End: 2021-04-17 | Stop reason: HOSPADM

## 2021-04-16 RX ORDER — LIDOCAINE HYDROCHLORIDE 10 MG/ML
1 INJECTION, SOLUTION INFILTRATION; PERINEURAL ONCE AS NEEDED
Status: DISCONTINUED | OUTPATIENT
Start: 2021-04-16 | End: 2021-04-17 | Stop reason: HOSPADM

## 2021-04-16 RX ORDER — MIDAZOLAM HYDROCHLORIDE 1 MG/ML
1 INJECTION INTRAMUSCULAR; INTRAVENOUS AS NEEDED
Status: DISCONTINUED | OUTPATIENT
Start: 2021-04-16 | End: 2021-04-17 | Stop reason: HOSPADM

## 2021-04-16 RX ORDER — SODIUM CHLORIDE 0.9 % (FLUSH) 0.9 %
3 SYRINGE (ML) INJECTION EVERY 12 HOURS SCHEDULED
Status: DISCONTINUED | OUTPATIENT
Start: 2021-04-16 | End: 2021-04-17 | Stop reason: HOSPADM

## 2021-04-16 RX ORDER — METHYLPREDNISOLONE ACETATE 80 MG/ML
80 INJECTION, SUSPENSION INTRA-ARTICULAR; INTRALESIONAL; INTRAMUSCULAR; SOFT TISSUE ONCE
Status: COMPLETED | OUTPATIENT
Start: 2021-04-16 | End: 2021-04-16

## 2021-04-16 RX ORDER — SODIUM CHLORIDE 0.9 % (FLUSH) 0.9 %
1-10 SYRINGE (ML) INJECTION AS NEEDED
Status: DISCONTINUED | OUTPATIENT
Start: 2021-04-16 | End: 2021-04-17 | Stop reason: HOSPADM

## 2021-04-16 RX ORDER — FENTANYL CITRATE 50 UG/ML
50 INJECTION, SOLUTION INTRAMUSCULAR; INTRAVENOUS AS NEEDED
Status: DISCONTINUED | OUTPATIENT
Start: 2021-04-16 | End: 2021-04-17 | Stop reason: HOSPADM

## 2021-04-16 RX ADMIN — MIDAZOLAM 2 MG: 1 INJECTION INTRAMUSCULAR; INTRAVENOUS at 13:40

## 2021-04-16 RX ADMIN — IOPAMIDOL 10 ML: 408 INJECTION, SOLUTION INTRATHECAL at 13:42

## 2021-04-16 RX ADMIN — METHYLPREDNISOLONE ACETATE 80 MG: 80 INJECTION, SUSPENSION INTRA-ARTICULAR; INTRALESIONAL; INTRAMUSCULAR; SOFT TISSUE at 13:43

## 2021-04-16 NOTE — ANESTHESIA PROCEDURE NOTES
PAIN Epidural block      Patient reassessed immediately prior to procedure    Patient location during procedure: pain clinic  Start Time: 4/16/2021 1:36 PM  Stop Time: 4/16/2021 1:51 PM  Indication:procedure for pain  Performed By  Anesthesiologist: Richard Selby MD  Preanesthetic Checklist  Completed: patient identified and risks and benefits discussed  Additional Notes  Diagnosis:     Post-Op Diagnosis Codes:     * Lumbar degenerative disc disease (M51.36)     * Lumbar neuritis (M54.16)    Sedation:  Versed 2 mg    A lumbar epidural steroid injection with fluoroscopic guidance and an Isovue dye epidurogram was performed.  Under fluoroscopic guidance, the epidural space was identified and accessed, confirmed by loss of resistance to saline followed by injection of Isovue dye, which shows a good epidurogram.  The above medications were injected uneventfully.    Prep:  Pt Position:prone  Sterile Tech:cap, gloves, mask and sterile barrier  Prep:chlorhexidine gluconate and isopropyl alcohol  Monitoring:blood pressure monitoring, continuous pulse oximetry and EKG  Procedure:Sedation: yes     Approach:right paramedian  Guidance: fluoroscopy  Location:lumbar  Level:4-5  Needle Type:Tuohy  Needle Gauge:20  Aspiration:negative  Medications:  Depomedrol:80  Preservative Free Saline:1mL  Isovue:1mL    Post Assessment:  Pt Tolerance:patient tolerated the procedure well with no apparent complications  Complications:no

## 2021-04-16 NOTE — H&P
He has a chief complaint of low back pain and right foot pain.  In June of last year he began to experience low lumbar back pain which was associated with right foot numbness tingling and pain.  Aggravated by sitting straining laying down.  It is affecting his sleep and his walking.  For over 6 weeks he is tried over-the-counter meds as conservative therapy along with rest.  His MRI shows degeneration    Current Outpatient Medications on File Prior to Encounter   Medication Sig Dispense Refill   • atorvastatin (LIPITOR) 40 MG tablet TAKE ONE TABLET BY MOUTH DAILY (Patient taking differently: Take 40 mg by mouth Every Night.) 90 tablet 1   • cetirizine (ZyrTEC) 10 MG tablet Take 10 mg by mouth Daily.     • glipizide (GLUCOTROL) 5 MG tablet TAKE ONE TABLET BY MOUTH DAILY BEFORE SUPPER 30 tablet 3   • lansoprazole (PREVACID) 15 MG capsule Take 1 capsule by mouth Daily. (Patient taking differently: Take 15 mg by mouth Every Morning.) 90 capsule 1   • lisinopril-hydrochlorothiazide (PRINZIDE,ZESTORETIC) 20-12.5 MG per tablet Take 1 tablet by mouth Daily. 90 tablet 1   • Melatonin 10 MG capsule Take 10 mg by mouth Every Night.     • metFORMIN (GLUCOPHAGE) 500 MG tablet Take 2 tablets by mouth 2 (Two) Times a Day With Meals. 360 tablet 0   • metoprolol succinate XL (TOPROL-XL) 50 MG 24 hr tablet TAKE ONE TABLET BY MOUTH DAILY (Patient taking differently: Take 50 mg by mouth Every Morning.) 90 tablet 1   • Misc Natural Products (TART CHERRY ADVANCED) capsule Take 1 tablet by mouth Daily.     • Multiple Vitamin (MULTIVITAMIN) capsule Take 1 capsule by mouth Daily.     • nicotine (NICODERM CQ) 21 MG/24HR patch Place 1 patch on the skin as directed by provider Daily. 28 patch 1   • Triamcinolone Acetonide (NASACORT) 55 MCG/ACT nasal inhaler 2 sprays into each nostril daily.     • clopidogrel (PLAVIX) 75 MG tablet Take 1 tablet by mouth Daily. 90 tablet 0   • Omega-3 Fatty Acids (FISH OIL) 1200 MG capsule capsule Take 1,200 mg  "by mouth Daily With Breakfast.       No current facility-administered medications on file prior to encounter.       Past Medical History:   Diagnosis Date   • CAD (coronary artery disease)    • Diabetes mellitus (CMS/[facility])    • GERD (gastroesophageal reflux disease)    • Hyperlipidemia    • Hypertension    • Myocardial infarction (CMS/[facility]) 1996, 2015   • Osteoarthritis    • PVD (peripheral vascular disease) (CMS/[facility])    • Sciatic nerve pain        No hematologic infectious or constitutional symptoms  Negative screen for JEMMA      Exam:  /82 (BP Location: Left arm, Patient Position: Sitting)   Pulse 90   Temp 36.3 °C (97.3 °F) (Infrared)   Resp 14   Ht 172.7 cm (68\")   Wt 70.3 kg (155 lb)   SpO2 100%   BMI 23.57 kg/m²   Airway Mallampatti 2  Alert and oriented      Diagnosis:  Post-Op Diagnosis Codes:     * Lumbar degenerative disc disease [M51.36]     * Lumbar neuritis [M54.16]    Plan:  Lumbar 4 epidural steroid injection under fluoroscopic guidance    I have encouraged them to continue:  1.  Physical therapy exercises at home as prescribed by physical therapy or from the pain clinic handout (given to the patient).  Continuation of these exercises every day, or multiple times per week, even when the patient has good pain relief, was stressed to the patient as a preventative measure to decrease the frequency and severity of future pain episodes.  2.  Continue pain medicines as already prescribed.  If patient not currently taking any, it is recommended to begin Acetaminophen 1000 mg po q 8 hours.  If other medicines containing Acetaminophen are currently prescribed, maintain daily dose at 3000mg.    3.  If they can tolerate NSAIDS, it is recommended to take Ibuprofen 600 mg po q 6 hours for 7 days during pain exacerbations.   Alternatively, they may substitute an NSAID of their choice (e.g. Aleve)  4.  Heat and ice to the affected area as tolerated for pain control.  It was discussed that heating pads can " cause burns.  5.  Low impact exercise such as walking or water exercise was recommended to maintain overall health and aid in weight control.   6.  Follow up as needed for subsequent injections.  7.  Patient was counseled to abstain from tobacco products.

## 2021-04-28 ENCOUNTER — OFFICE VISIT (OUTPATIENT)
Dept: FAMILY MEDICINE CLINIC | Facility: CLINIC | Age: 76
End: 2021-04-28

## 2021-04-28 VITALS
HEIGHT: 68 IN | BODY MASS INDEX: 22.46 KG/M2 | DIASTOLIC BLOOD PRESSURE: 70 MMHG | TEMPERATURE: 97.1 F | WEIGHT: 148.2 LBS | SYSTOLIC BLOOD PRESSURE: 136 MMHG | HEART RATE: 74 BPM | OXYGEN SATURATION: 96 %

## 2021-04-28 DIAGNOSIS — E11.40 TYPE 2 DIABETES MELLITUS WITH DIABETIC NEUROPATHY, WITHOUT LONG-TERM CURRENT USE OF INSULIN (HCC): Primary | ICD-10-CM

## 2021-04-28 PROCEDURE — 99213 OFFICE O/P EST LOW 20 MIN: CPT | Performed by: NURSE PRACTITIONER

## 2021-04-28 NOTE — PROGRESS NOTES
Answers for HPI/ROS submitted by the patient on 4/23/2021  What is the primary reason for your visit?: Diabetes  Diabetes type: type 2  MedicAlert ID: No  blurred vision: No  chest pain: No  fatigue: No  foot paresthesias: Yes  foot ulcerations: No  polydipsia: No  polyphagia: No  polyuria: Yes  visual change: No  weakness: No  weight loss: No  Symptom course: stable  confusion: No  dizziness: No  headaches: No  hunger: No  mood changes: No  nervous/anxious: No  pallor: No  seizures: No  sleepiness: No  speech difficulty: No  sweats: No  tremors: No  blackouts: No  hospitalization: No  nocturnal hypoglycemia: No  required assistance: No  required glucagon: No  CVA: No  heart disease: No  impotence: No  nephropathy: No  peripheral neuropathy: Yes  PVD: Yes  retinopathy: No  CAD risks: family history, hypertension  Current treatments: oral agent (dual therapy)  Treatment compliance: most of the time  Weight trend: stable  Current diet: generally healthy  Meal planning: carbohydrate counting  Exercise: intermittently  Dietitian visit: No  Eye exam current: Yes  Sees podiatrist: No    Chief Complaint  Diabetes (f/u A1c)    Subjective          Niraj Hassan presents to [facility]  Diabetes  He has type 2 diabetes mellitus. No MedicAlert identification noted. Pertinent negatives for hypoglycemia include no confusion, dizziness, headaches, hunger, mood changes, nervousness/anxiousness, pallor, seizures, sleepiness, speech difficulty, sweats or tremors. Associated symptoms include foot paresthesias and polyuria. Pertinent negatives for diabetes include no blurred vision, no chest pain, no fatigue, no foot ulcerations, no polydipsia, no polyphagia, no visual change, no weakness and no weight loss. Pertinent negatives for hypoglycemia complications include no blackouts, no hospitalization, no nocturnal hypoglycemia, no required assistance and no required glucagon injection. Symptoms are  stable. Diabetic complications include peripheral neuropathy and PVD. Pertinent negatives for diabetic complications include no CVA, heart disease, impotence, nephropathy or retinopathy. Risk factors for coronary artery disease include family history and hypertension. Current diabetic treatment includes oral agent (dual therapy). He is compliant with treatment most of the time. His weight is stable. He is following a generally healthy diet. Meal planning includes carbohydrate counting. He has not had a previous visit with a dietitian. He participates in exercise intermittently. He does not see a podiatrist.Eye exam is current.    patient is here for follow-up on type 2 diabetes.  He has been taking and tolerating his Metformin well.  He denies any side effects or missed doses.  He has been trying to take his glipizide regularly prior to dinner but does not always remember to do this.  He has been really trying to control his carbohydrate intake and has also recently increased his activity.  He is feeling much better than he was several months ago.  He recently saw vascular and his graft remains open with only approximately a 10% blockage remaining which has been stable.  He will follow up again in 6 months.  He had his first epidural and has had noticed small daily improvement in his leg and foot pain since.  He still has numbness in his right foot but no pain currently.  Sometimes makes him feel little unstable but has not had any falls.  He has since stopped his gabapentin which he was taking for nightly foot pain and sleep.  He has started taking valerian root and this really helps him sleep.  He has stopped the melatonin which was not all that helpful.    He denies chest pain, heart palpitations, shortness of breath, cough.  He is working on trying to quit smoking.  He has gone up and down but plans to really work for complete cessation over the next several months now that his pain has been improving.  He  "denies any GI symptoms.  He has no urinary symptoms.  Objective   Vital Signs:   /70   Pulse 74   Temp 97.1 °F (36.2 °C)   Ht 172.7 cm (68\")   Wt 67.2 kg (148 lb 3.2 oz)   SpO2 96%   BMI 22.53 kg/m²     Physical Exam  Vitals and nursing note reviewed.   Constitutional:       General: He is not in acute distress.     Appearance: He is well-developed. He is not ill-appearing or diaphoretic.   HENT:      Head: Normocephalic and atraumatic.   Eyes:      General:         Right eye: No discharge.         Left eye: No discharge.      Conjunctiva/sclera: Conjunctivae normal.   Cardiovascular:      Rate and Rhythm: Normal rate and regular rhythm.      Heart sounds: Normal heart sounds.   Pulmonary:      Effort: Pulmonary effort is normal.      Breath sounds: Normal breath sounds.   Abdominal:      General: Bowel sounds are normal.      Palpations: Abdomen is soft.      Tenderness: There is no abdominal tenderness.   Musculoskeletal:         General: No deformity.      Comments: Gait smooth and steady   Skin:     General: Skin is warm and dry.   Neurological:      General: No focal deficit present.      Mental Status: He is alert and oriented to person, place, and time.   Psychiatric:         Mood and Affect: Mood normal.         Behavior: Behavior normal.        Result Review :                 Assessment and Plan    Diagnoses and all orders for this visit:    1. Type 2 diabetes mellitus with diabetic neuropathy, without long-term current use of insulin (CMS/[facility]) (Primary)  -     Hemoglobin A1c      Type 2 diabetes: Had been stable with recent increase.  Glipizide was added to his largest meal which he is mostly compliant with.  We discussed activity and carbohydrate management.  We will get A1c today and base further treatment plans on this.  He will follow up again in 3 months.    I am really happy to see his pain resolving and his activity level is increasing.  He seems hopeful that he will be able to regain " some normalcy.           Follow Up   Return in about 3 months (around 7/28/2021).  Patient was given instructions and counseling regarding his condition or for health maintenance advice. Please see specific information pulled into the AVS if appropriate.

## 2021-04-29 DIAGNOSIS — E11.40 TYPE 2 DIABETES MELLITUS WITH DIABETIC NEUROPATHY, WITHOUT LONG-TERM CURRENT USE OF INSULIN (HCC): Primary | ICD-10-CM

## 2021-04-29 DIAGNOSIS — E78.2 MIXED HYPERLIPIDEMIA: ICD-10-CM

## 2021-04-29 LAB — HBA1C MFR BLD: 7.7 % (ref 4.8–5.6)

## 2021-05-14 ENCOUNTER — HOSPITAL ENCOUNTER (OUTPATIENT)
Dept: PAIN MEDICINE | Facility: HOSPITAL | Age: 76
Discharge: HOME | End: 2021-05-14

## 2021-05-14 ENCOUNTER — HOSPITAL ENCOUNTER (OUTPATIENT)
Dept: GENERAL RADIOLOGY | Facility: HOSPITAL | Age: 76
Discharge: HOME | End: 2021-05-14

## 2021-05-14 ENCOUNTER — ANESTHESIA (OUTPATIENT)
Dept: PAIN MEDICINE | Facility: HOSPITAL | Age: 76
End: 2021-05-14

## 2021-05-14 ENCOUNTER — ANESTHESIA EVENT (OUTPATIENT)
Dept: PAIN MEDICINE | Facility: HOSPITAL | Age: 76
End: 2021-05-14

## 2021-05-14 VITALS
RESPIRATION RATE: 16 BRPM | HEART RATE: 70 BPM | TEMPERATURE: 97.1 F | DIASTOLIC BLOOD PRESSURE: 71 MMHG | OXYGEN SATURATION: 96 % | SYSTOLIC BLOOD PRESSURE: 145 MMHG

## 2021-05-14 DIAGNOSIS — G89.29 CHRONIC BACK PAIN, UNSPECIFIED BACK LOCATION, UNSPECIFIED BACK PAIN LATERALITY: ICD-10-CM

## 2021-05-14 DIAGNOSIS — R52 PAIN: ICD-10-CM

## 2021-05-14 DIAGNOSIS — M54.9 CHRONIC BACK PAIN, UNSPECIFIED BACK LOCATION, UNSPECIFIED BACK PAIN LATERALITY: ICD-10-CM

## 2021-05-14 DIAGNOSIS — M51.36 DDD (DEGENERATIVE DISC DISEASE), LUMBAR: ICD-10-CM

## 2021-05-14 DIAGNOSIS — M54.16 LUMBAR NEURITIS: Primary | ICD-10-CM

## 2021-05-14 PROCEDURE — 25010000002 METHYLPREDNISOLONE PER 80 MG: Performed by: ANESTHESIOLOGY

## 2021-05-14 PROCEDURE — 77003 FLUOROGUIDE FOR SPINE INJECT: CPT

## 2021-05-14 PROCEDURE — 0 IOPAMIDOL 41 % SOLUTION: Performed by: ANESTHESIOLOGY

## 2021-05-14 RX ORDER — LIDOCAINE HYDROCHLORIDE 10 MG/ML
1 INJECTION, SOLUTION INFILTRATION; PERINEURAL ONCE
Status: DISCONTINUED | OUTPATIENT
Start: 2021-05-14 | End: 2021-05-15 | Stop reason: HOSPADM

## 2021-05-14 RX ORDER — METHYLPREDNISOLONE ACETATE 80 MG/ML
80 INJECTION, SUSPENSION INTRA-ARTICULAR; INTRALESIONAL; INTRAMUSCULAR; SOFT TISSUE ONCE
Status: COMPLETED | OUTPATIENT
Start: 2021-05-14 | End: 2021-05-14

## 2021-05-14 RX ORDER — FENTANYL CITRATE 50 UG/ML
50 INJECTION, SOLUTION INTRAMUSCULAR; INTRAVENOUS AS NEEDED
Status: DISCONTINUED | OUTPATIENT
Start: 2021-05-14 | End: 2021-05-15 | Stop reason: HOSPADM

## 2021-05-14 RX ORDER — SODIUM CHLORIDE 0.9 % (FLUSH) 0.9 %
3 SYRINGE (ML) INJECTION EVERY 12 HOURS SCHEDULED
Status: DISCONTINUED | OUTPATIENT
Start: 2021-05-14 | End: 2021-05-15 | Stop reason: HOSPADM

## 2021-05-14 RX ORDER — MIDAZOLAM HYDROCHLORIDE 1 MG/ML
1 INJECTION INTRAMUSCULAR; INTRAVENOUS AS NEEDED
Status: DISCONTINUED | OUTPATIENT
Start: 2021-05-14 | End: 2021-05-15 | Stop reason: HOSPADM

## 2021-05-14 RX ORDER — SODIUM CHLORIDE 0.9 % (FLUSH) 0.9 %
3-10 SYRINGE (ML) INJECTION AS NEEDED
Status: DISCONTINUED | OUTPATIENT
Start: 2021-05-14 | End: 2021-05-15 | Stop reason: HOSPADM

## 2021-05-14 RX ADMIN — METHYLPREDNISOLONE ACETATE 80 MG: 80 INJECTION, SUSPENSION INTRA-ARTICULAR; INTRALESIONAL; INTRAMUSCULAR; SOFT TISSUE at 12:46

## 2021-05-14 RX ADMIN — IOPAMIDOL 10 ML: 408 INJECTION, SOLUTION INTRATHECAL at 12:46

## 2021-05-14 NOTE — ANESTHESIA PROCEDURE NOTES
Lumbar epidural steroid injection    Pre-sedation assessment completed: 5/14/2021 12:35 PM    Patient reassessed immediately prior to procedure    Patient location during procedure: pain clinic  Start Time: 5/14/2021 12:41 PM  Stop Time: 5/14/2021 12:47 PM  Indication:procedure for pain  Performed By  Anesthesiologist: Prasanna Fuller MD  Preanesthetic Checklist  Completed: patient identified, IV checked, site marked, risks and benefits discussed, surgical consent, monitors and equipment checked, pre-op evaluation and timeout performed  Additional Notes  Post-Op Diagnosis Codes:     * DDD (degenerative disc disease), lumbar (M51.36)     * Lumbar neuritis (M54.16)    The patient was observed in recovery with no evidence of neurological deficits or other problems. All questions were answered. The patient was discharged with appropriate instructions.  Prep:  Pt Position:prone  Sterile Tech:cap, gloves, mask and sterile barrier  Prep:chlorhexidine gluconate and isopropyl alcohol  Monitoring:blood pressure monitoring, continuous pulse oximetry and EKG  Procedure:Sedation: no     Approach: Right of midline.  Guidance: fluoroscopy  Location:lumbar  Level:4-5  Needle Type:Tuohy  Needle Gauge:20 G  Aspiration:negative  Medications:  Depomedrol:80 mg  Preservative Free Saline:3mL  Isovue:1mL  Comments:Appropriate epidural spread of contrast.   Post Assessment:  Post-procedure: Dressing per nursing.  Pt Tolerance:patient tolerated the procedure well with no apparent complications  Complications:no

## 2021-05-24 ENCOUNTER — OFFICE VISIT (OUTPATIENT)
Dept: NEUROSURGERY | Facility: CLINIC | Age: 76
End: 2021-05-24

## 2021-05-24 VITALS
TEMPERATURE: 98 F | WEIGHT: 147.8 LBS | HEIGHT: 68 IN | SYSTOLIC BLOOD PRESSURE: 138 MMHG | HEART RATE: 64 BPM | BODY MASS INDEX: 22.4 KG/M2 | DIASTOLIC BLOOD PRESSURE: 78 MMHG

## 2021-05-24 DIAGNOSIS — E78.5 HYPERLIPIDEMIA ASSOCIATED WITH TYPE 2 DIABETES MELLITUS (HCC): ICD-10-CM

## 2021-05-24 DIAGNOSIS — M51.36 DDD (DEGENERATIVE DISC DISEASE), LUMBAR: Primary | ICD-10-CM

## 2021-05-24 DIAGNOSIS — E78.1 HIGH TRIGLYCERIDES: ICD-10-CM

## 2021-05-24 DIAGNOSIS — E11.69 HYPERLIPIDEMIA ASSOCIATED WITH TYPE 2 DIABETES MELLITUS (HCC): ICD-10-CM

## 2021-05-24 DIAGNOSIS — M47.816 FACET ARTHROPATHY, LUMBAR: ICD-10-CM

## 2021-05-24 PROCEDURE — 99214 OFFICE O/P EST MOD 30 MIN: CPT | Performed by: NURSE PRACTITIONER

## 2021-05-24 RX ORDER — GLIPIZIDE 5 MG/1
5 TABLET ORAL
Qty: 30 TABLET | Refills: 0 | Status: SHIPPED | OUTPATIENT
Start: 2021-05-24 | End: 2021-06-01 | Stop reason: SDUPTHER

## 2021-05-24 RX ORDER — CLOPIDOGREL BISULFATE 75 MG/1
75 TABLET ORAL DAILY
Qty: 90 TABLET | Refills: 0 | Status: SHIPPED | OUTPATIENT
Start: 2021-05-24 | End: 2021-06-24 | Stop reason: SDUPTHER

## 2021-05-24 RX ORDER — ATORVASTATIN CALCIUM 40 MG/1
40 TABLET, FILM COATED ORAL DAILY
Qty: 90 TABLET | Refills: 0 | Status: SHIPPED | OUTPATIENT
Start: 2021-05-24 | End: 2021-10-25

## 2021-06-01 RX ORDER — METOPROLOL SUCCINATE 50 MG/1
50 TABLET, EXTENDED RELEASE ORAL DAILY
Qty: 90 TABLET | Refills: 1 | Status: SHIPPED | OUTPATIENT
Start: 2021-06-01 | End: 2021-07-27

## 2021-06-03 RX ORDER — GLIPIZIDE 5 MG/1
5 TABLET ORAL
Qty: 90 TABLET | Refills: 0 | Status: SHIPPED | OUTPATIENT
Start: 2021-06-03 | End: 2021-06-24 | Stop reason: SDUPTHER

## 2021-06-18 ENCOUNTER — PREP FOR SURGERY (OUTPATIENT)
Dept: SURGERY | Facility: SURGERY CENTER | Age: 76
End: 2021-06-18

## 2021-06-18 DIAGNOSIS — K22.70 BARRETT'S ESOPHAGUS WITHOUT DYSPLASIA: Primary | ICD-10-CM

## 2021-06-18 RX ORDER — SODIUM CHLORIDE 0.9 % (FLUSH) 0.9 %
3 SYRINGE (ML) INJECTION EVERY 12 HOURS SCHEDULED
Status: CANCELLED | OUTPATIENT
Start: 2021-06-18

## 2021-06-18 RX ORDER — SODIUM CHLORIDE 0.9 % (FLUSH) 0.9 %
10 SYRINGE (ML) INJECTION AS NEEDED
Status: CANCELLED | OUTPATIENT
Start: 2021-06-18

## 2021-06-18 RX ORDER — SODIUM CHLORIDE, SODIUM LACTATE, POTASSIUM CHLORIDE, CALCIUM CHLORIDE 600; 310; 30; 20 MG/100ML; MG/100ML; MG/100ML; MG/100ML
30 INJECTION, SOLUTION INTRAVENOUS CONTINUOUS PRN
Status: CANCELLED | OUTPATIENT
Start: 2021-06-18

## 2021-06-24 NOTE — TELEPHONE ENCOUNTER
Caller: [facility] - [place], [place] - [address] - [phone] [facility] [phone] FX    Relationship: Pharmacy    Best call back number: [phone]    Medication needed:   Requested Prescriptions     Pending Prescriptions Disp Refills   • glipizide (GLUCOTROL) 5 MG tablet 90 tablet 0     Sig: Take 1 tablet by mouth Daily Before Supper.   • metFORMIN (GLUCOPHAGE) 500 MG tablet 360 tablet 1     Sig: Take 2 tablets by mouth 2 (Two) Times a Day With Meals.   • clopidogrel (PLAVIX) 75 MG tablet 90 tablet 0     Sig: Take 1 tablet by mouth Daily.       When do you need the refill by: ASAP    What additional details did the patient provide when requesting the medication: PATIENT IS ALMOST OUT    Does the patient have less than a 3 day supply:  [x] Yes  [] No    What is the patient's preferred pharmacy: [facility] - [place], [place] - [address] - [phone] [facility] [phone] FX

## 2021-06-25 ENCOUNTER — APPOINTMENT (OUTPATIENT)
Dept: PAIN MEDICINE | Facility: HOSPITAL | Age: 76
End: 2021-06-25

## 2021-06-25 RX ORDER — CLOPIDOGREL BISULFATE 75 MG/1
75 TABLET ORAL DAILY
Qty: 90 TABLET | Refills: 1 | Status: SHIPPED | OUTPATIENT
Start: 2021-06-25 | End: 2021-09-14

## 2021-06-25 RX ORDER — GLIPIZIDE 5 MG/1
5 TABLET ORAL
Qty: 90 TABLET | Refills: 1 | Status: SHIPPED | OUTPATIENT
Start: 2021-06-25 | End: 2021-08-19 | Stop reason: SDUPTHER

## 2021-07-16 ENCOUNTER — OFFICE VISIT (OUTPATIENT)
Dept: FAMILY MEDICINE CLINIC | Facility: CLINIC | Age: 76
End: 2021-07-16

## 2021-07-16 VITALS
TEMPERATURE: 96.8 F | WEIGHT: 147.2 LBS | SYSTOLIC BLOOD PRESSURE: 125 MMHG | HEART RATE: 85 BPM | OXYGEN SATURATION: 100 % | BODY MASS INDEX: 22.31 KG/M2 | DIASTOLIC BLOOD PRESSURE: 71 MMHG | HEIGHT: 68 IN

## 2021-07-16 DIAGNOSIS — I10 ESSENTIAL HYPERTENSION: ICD-10-CM

## 2021-07-16 DIAGNOSIS — E78.2 MIXED HYPERLIPIDEMIA: ICD-10-CM

## 2021-07-16 DIAGNOSIS — Z12.2 ENCOUNTER FOR SCREENING FOR LUNG CANCER: ICD-10-CM

## 2021-07-16 DIAGNOSIS — E11.40 TYPE 2 DIABETES MELLITUS WITH DIABETIC NEUROPATHY, WITHOUT LONG-TERM CURRENT USE OF INSULIN (HCC): ICD-10-CM

## 2021-07-16 DIAGNOSIS — E11.9 TYPE 2 DIABETES MELLITUS WITHOUT COMPLICATION, WITHOUT LONG-TERM CURRENT USE OF INSULIN (HCC): Primary | ICD-10-CM

## 2021-07-16 DIAGNOSIS — Z87.891 PERSONAL HISTORY OF NICOTINE DEPENDENCE: ICD-10-CM

## 2021-07-16 PROCEDURE — 99214 OFFICE O/P EST MOD 30 MIN: CPT | Performed by: NURSE PRACTITIONER

## 2021-07-16 NOTE — PROGRESS NOTES
"Answers for HPI/ROS submitted by the patient on 7/13/2021  What is the primary reason for your visit?: Other  Please describe your symptoms.: Annual wellness exam  Have you had these symptoms before?: Yes  How long have you been having these symptoms?: Greater than 2 weeks    Chief Complaint  Diabetes (f/u dm )    Subjective          Niraj Hassan presents to [facility]  History of Present Illness here for T2DM f/u.  Thinks he has been doing well since last visit. Has been trying to avoid sweets and admits he does not have much appetite at times.  He has been doing well on glipizide always takes it prior to meal and does not take it if he does not eat.  No hypoglycemia.     HTN:  Taking and abdoulaye BP meds.  No side effects.  Not really checking his BP at home. No ankle swelling.     Neuropathic leg pain has been better. He still has some numbness medial aspect of right foot, but does not bother him unless he is laying down.  Has been able to be a little more active.     Has had injection to back for his back pain causing neuropathic sx.  The injection seems to be working well.  He has had pretty good improvement in sx.     He is still working on cutting back on smoking and has further reduced consumption.  He knows this has probably contributed to most of his current problems. He is down to less than 1/2 PPD and still working on it.  Has not used patches.    Taking and abdoulaye statin without side effects. Denies myalgia.     Denies chest pain, palpitations, dyspnea, cough, n/v/d, abd pain. No s/s hypo/hyperglycemia.             Objective   Vital Signs:   /71   Pulse 85   Temp 96.8 °F (36 °C)   Ht 172.7 cm (68\")   Wt 66.8 kg (147 lb 3.2 oz)   SpO2 100%   BMI 22.38 kg/m²     Physical Exam  Vitals and nursing note reviewed.   Constitutional:       General: He is not in acute distress.     Appearance: He is well-developed. He is not ill-appearing or diaphoretic.   HENT:      Head: " Normocephalic and atraumatic.   Eyes:      General:         Right eye: No discharge.         Left eye: No discharge.      Conjunctiva/sclera: Conjunctivae normal.   Cardiovascular:      Rate and Rhythm: Normal rate and regular rhythm.      Heart sounds: Normal heart sounds.   Pulmonary:      Effort: Pulmonary effort is normal.      Breath sounds: Normal breath sounds.   Abdominal:      General: Bowel sounds are normal.      Palpations: Abdomen is soft.      Tenderness: There is no abdominal tenderness.   Musculoskeletal:         General: No deformity.      Cervical back: Neck supple.      Comments: Gait smooth and steady   Lymphadenopathy:      Cervical: No cervical adenopathy.   Skin:     General: Skin is warm and dry.   Neurological:      General: No focal deficit present.      Mental Status: He is alert and oriented to person, place, and time.   Psychiatric:         Mood and Affect: Mood normal.         Behavior: Behavior normal.        Result Review :                 Assessment and Plan    Diagnoses and all orders for this visit:    1. Type 2 diabetes mellitus without complication, without long-term current use of insulin (CMS/[facility]) (Primary)    2. Essential hypertension    3. Mixed hyperlipidemia    4. Encounter for screening for lung cancer  -     CT Chest Low Dose Wo; Future    5. Personal history of nicotine dependence   -     CT Chest Low Dose Wo; Future      T2DM: stable:  has been close to goal at last check. Will adjust meds prn based on labs. Needs an A1C today.  Diet discussed.     Lipids: stable:  have been controlled with elevated TGL.  Will check lipids today and make any needed changes based on lipid panel.      BP: stable:  BP is excellent today.  Discussed importance of staying at goal 120-130-70s due to his multiple CVD risks and CAD. Will continue current meds.     Pain is still improved with pain mgmt and no new claudication.     After discussion about tobacco cessation, pt is continuing to try  to work on cutting back.  We discussed risks and benefits of LDCT for lung cancer screening and he would like to get this done.      LDCT scan ordered after discussion.  Continued to encourage tobacco cessation.       Follow Up   Return in about 11 weeks (around 10/1/2021) for Medicare Wellness.  Patient was given instructions and counseling regarding his condition or for health maintenance advice. Please see specific information pulled into the AVS if appropriate.

## 2021-07-17 LAB
CHOLEST SERPL-MCNC: 98 MG/DL (ref 0–200)
HBA1C MFR BLD: 7.3 % (ref 4.8–5.6)
HDLC SERPL-MCNC: 31 MG/DL (ref 40–60)
LDLC SERPL CALC-MCNC: 39 MG/DL (ref 0–100)
LDLC/HDLC SERPL: 1.07 {RATIO}
TRIGL SERPL-MCNC: 169 MG/DL (ref 0–150)
VLDLC SERPL CALC-MCNC: 28 MG/DL (ref 5–40)

## 2021-07-26 PROBLEM — K22.70 BARRETT'S ESOPHAGUS WITHOUT DYSPLASIA: Status: ACTIVE | Noted: 2021-07-26

## 2021-07-27 RX ORDER — METOPROLOL SUCCINATE 50 MG/1
TABLET, EXTENDED RELEASE ORAL
Qty: 90 TABLET | Refills: 1 | Status: SHIPPED | OUTPATIENT
Start: 2021-07-27 | End: 2022-02-24

## 2021-07-29 ENCOUNTER — TRANSCRIBE ORDERS (OUTPATIENT)
Dept: SURGERY | Facility: SURGERY CENTER | Age: 76
End: 2021-07-29

## 2021-07-29 DIAGNOSIS — Z01.818 OTHER SPECIFIED PRE-OPERATIVE EXAMINATION: Primary | ICD-10-CM

## 2021-08-16 ENCOUNTER — HOSPITAL ENCOUNTER (OUTPATIENT)
Dept: CT IMAGING | Facility: HOSPITAL | Age: 76
Discharge: HOME | End: 2021-08-16
Admitting: NURSE PRACTITIONER

## 2021-08-16 DIAGNOSIS — Z87.891 PERSONAL HISTORY OF NICOTINE DEPENDENCE: ICD-10-CM

## 2021-08-16 DIAGNOSIS — Z12.2 ENCOUNTER FOR SCREENING FOR LUNG CANCER: ICD-10-CM

## 2021-08-16 PROCEDURE — 71271 CT THORAX LUNG CANCER SCR C-: CPT

## 2021-08-18 DIAGNOSIS — I10 ESSENTIAL HYPERTENSION: ICD-10-CM

## 2021-08-18 RX ORDER — LISINOPRIL AND HYDROCHLOROTHIAZIDE 20; 12.5 MG/1; MG/1
TABLET ORAL
Qty: 90 TABLET | Refills: 1 | Status: SHIPPED | OUTPATIENT
Start: 2021-08-18 | End: 2022-01-12

## 2021-08-19 NOTE — TELEPHONE ENCOUNTER
Rx Refill Note  Requested Prescriptions     Pending Prescriptions Disp Refills   • glipizide (GLUCOTROL) 5 MG tablet 90 tablet 1     Sig: Take 1 tablet by mouth Daily Before Supper.      Last office visit with prescribing clinician: 7/16/2021      Next office visit with prescribing clinician: 10/1/2021            Senait Dobbins MA  08/19/21, 15:43 EDT

## 2021-08-20 RX ORDER — GLIPIZIDE 5 MG/1
5 TABLET ORAL
Qty: 90 TABLET | Refills: 1 | Status: SHIPPED | OUTPATIENT
Start: 2021-08-20 | End: 2022-01-24

## 2021-09-14 RX ORDER — CLOPIDOGREL BISULFATE 75 MG/1
TABLET ORAL
Qty: 90 TABLET | Refills: 1 | Status: SHIPPED | OUTPATIENT
Start: 2021-09-14 | End: 2022-01-10

## 2021-09-14 NOTE — TELEPHONE ENCOUNTER
Rx Refill Note  Requested Prescriptions     Pending Prescriptions Disp Refills   • clopidogrel (PLAVIX) 75 MG tablet [Pharmacy Med Name: CLOPIDOGREL 75 MG TABLET] 90 tablet 1     Sig: TAKE ONE TABLET BY MOUTH DAILY      Last office visit with prescribing clinician: Visit date not found      Next office visit with prescribing clinician: Visit date not found            Vanessa Carbajal Rep  09/14/21, 13:07 EDT

## 2021-10-06 ENCOUNTER — OFFICE VISIT (OUTPATIENT)
Dept: FAMILY MEDICINE CLINIC | Facility: CLINIC | Age: 76
End: 2021-10-06

## 2021-10-06 VITALS
HEART RATE: 83 BPM | DIASTOLIC BLOOD PRESSURE: 74 MMHG | SYSTOLIC BLOOD PRESSURE: 139 MMHG | WEIGHT: 150.6 LBS | HEIGHT: 68 IN | TEMPERATURE: 96.8 F | BODY MASS INDEX: 22.82 KG/M2 | OXYGEN SATURATION: 100 %

## 2021-10-06 DIAGNOSIS — I10 ESSENTIAL HYPERTENSION: ICD-10-CM

## 2021-10-06 DIAGNOSIS — E78.2 MIXED HYPERLIPIDEMIA: ICD-10-CM

## 2021-10-06 DIAGNOSIS — I73.9 PVD (PERIPHERAL VASCULAR DISEASE) (HCC): ICD-10-CM

## 2021-10-06 DIAGNOSIS — Z00.00 MEDICARE ANNUAL WELLNESS VISIT, SUBSEQUENT: Primary | ICD-10-CM

## 2021-10-06 DIAGNOSIS — E11.40 TYPE 2 DIABETES MELLITUS WITH DIABETIC NEUROPATHY, WITHOUT LONG-TERM CURRENT USE OF INSULIN (HCC): ICD-10-CM

## 2021-10-06 DIAGNOSIS — Z23 INFLUENZA VACCINE NEEDED: ICD-10-CM

## 2021-10-06 PROCEDURE — 96160 PT-FOCUSED HLTH RISK ASSMT: CPT | Performed by: NURSE PRACTITIONER

## 2021-10-06 PROCEDURE — 99214 OFFICE O/P EST MOD 30 MIN: CPT | Performed by: NURSE PRACTITIONER

## 2021-10-06 PROCEDURE — 1160F RVW MEDS BY RX/DR IN RCRD: CPT | Performed by: NURSE PRACTITIONER

## 2021-10-06 PROCEDURE — G0439 PPPS, SUBSEQ VISIT: HCPCS | Performed by: NURSE PRACTITIONER

## 2021-10-06 PROCEDURE — 90662 IIV NO PRSV INCREASED AG IM: CPT | Performed by: NURSE PRACTITIONER

## 2021-10-06 PROCEDURE — 1170F FXNL STATUS ASSESSED: CPT | Performed by: NURSE PRACTITIONER

## 2021-10-06 PROCEDURE — G0008 ADMIN INFLUENZA VIRUS VAC: HCPCS | Performed by: NURSE PRACTITIONER

## 2021-10-06 RX ORDER — ASPIRIN 325 MG
325 TABLET ORAL DAILY
COMMUNITY

## 2021-10-06 NOTE — PROGRESS NOTES
The ABCs of the Annual Wellness Visit  Subsequent Medicare Wellness Visit    Chief Complaint   Patient presents with   • Annual Exam     AWV      Subjective    History of Present Illness:  Niraj Hassan is a 76 y.o. male who presents for a Subsequent Medicare Wellness Visit.    The following portions of the patient's history were reviewed and   updated as appropriate: allergies, current medications, past family history, past medical history, past social history, past surgical history and problem list.    Compared to one year ago, the patient feels his physical   health is better.    Compared to one year ago, the patient feels his mental   health is better.    Recent Hospitalizations:  This patient has had a [facility] admission record on file within the last 365 days.    Current Medical Providers:  Patient Care Team:  Joanna Salvador APRN as PCP - General (Nurse Practitioner)  Khadra Chester APRN as Nurse Practitioner (Gastroenterology)  Dharmesh Collazo MD as Surgeon (Vascular Surgery)  Shamir Manriquez MD as Consulting Physician (Cardiology)    Outpatient Medications Prior to Visit   Medication Sig Dispense Refill   • aspirin 325 MG tablet Take 325 mg by mouth Daily.     • atorvastatin (LIPITOR) 40 MG tablet Take 1 tablet by mouth Daily. 90 tablet 0   • cetirizine (ZyrTEC) 10 MG tablet Take 10 mg by mouth Daily.     • clopidogrel (PLAVIX) 75 MG tablet TAKE ONE TABLET BY MOUTH DAILY 90 tablet 1   • glipizide (GLUCOTROL) 5 MG tablet Take 1 tablet by mouth Daily Before Supper. 90 tablet 1   • lansoprazole (PREVACID) 15 MG capsule Take 1 capsule by mouth Daily. (Patient taking differently: Take 15 mg by mouth Every Morning.) 90 capsule 1   • lisinopril-hydrochlorothiazide (PRINZIDE,ZESTORETIC) 20-12.5 MG per tablet TAKE 1 TABLET EVERY DAY 90 tablet 1   • metFORMIN (GLUCOPHAGE) 500 MG tablet TAKE TWO TABLETS BY MOUTH TWICE A DAY WITH MEALS 120 tablet 2   • metoprolol succinate XL (TOPROL-XL) 50 MG  24 hr tablet TAKE 1 TABLET EVERY DAY 90 tablet 1   • Misc Natural Products (TART CHERRY ADVANCED) capsule Take 1 tablet by mouth Daily.     • Multiple Vitamin (MULTIVITAMIN) capsule Take 1 capsule by mouth Daily.     • Omega-3 Fatty Acids (FISH OIL) 1200 MG capsule capsule Take 1,200 mg by mouth Daily With Breakfast.     • Triamcinolone Acetonide (NASACORT) 55 MCG/ACT nasal inhaler 2 sprays into each nostril daily.     • nicotine (NICODERM CQ) 21 MG/24HR patch Place 1 patch on the skin as directed by provider Daily. 28 patch 1     No facility-administered medications prior to visit.       No opioid medication identified on active medication list. I have reviewed chart for other potential  high risk medication/s and harmful drug interactions in the elderly.          Aspirin is not on active medication list.  Aspirin use is indicated based on review of current medical condition/s. Pros and cons of this therapy have been discussed with this patient. Benefits of this medication outweigh potential harm.  Patient has been instructed to start taking this medication..    Patient Active Problem List   Diagnosis   • Essential hypertension   • Mixed hyperlipidemia   • Type 2 diabetes mellitus without complication, without long-term current use of insulin ([facility])   • Slow urinary stream   • Sciatica of right side   • Medicare annual wellness visit, initial   • Abnormal ankle brachial index (CAT)   • Claudication ([facility])   • CAD (coronary artery disease)   • GERD (gastroesophageal reflux disease)   • Type 2 diabetes mellitus with diabetic neuropathy, without long-term current use of insulin ([facility])   • Vitiligo   • Back pain   • Numbness of right foot   • PVD (peripheral vascular disease) ([facility])   • DDD (degenerative disc disease), lumbar   • Lumbar neuritis   • Facet arthropathy, lumbar   • Abreu's esophagus without dysplasia     Advance Care Planning  Advance Directive is not on file.  ACP discussion was held with the patient  "during this visit. Patient does not have an advance directive, information provided.    Review of Systems   Constitutional: Negative for activity change, appetite change and fatigue.   HENT: Positive for congestion (occasional allergies). Negative for ear discharge, ear pain, sore throat and trouble swallowing.    Eyes: Positive for visual disturbance (corrective lenses-stable). Negative for pain.   Respiratory: Positive for cough (occasional-allergies). Negative for shortness of breath and wheezing.    Cardiovascular: Positive for leg swelling (occasional-uses compression prn). Negative for chest pain and palpitations.   Gastrointestinal: Negative for abdominal pain, blood in stool, constipation, diarrhea, nausea and vomiting.   Endocrine: Negative for polydipsia and polyuria.   Genitourinary: Positive for urgency. Negative for difficulty urinating and hematuria.        Sometimes has urgency  Has slow stream  No nocturia   Musculoskeletal: Positive for arthralgias and myalgias.        Right foot pain has been worsening recently   Skin: Negative for rash and bruise.   Neurological: Positive for numbness (right foot). Negative for dizziness and weakness.   Hematological: Bruises/bleeds easily.   Psychiatric/Behavioral: Positive for sleep disturbance. Negative for dysphoric mood. The patient is not nervous/anxious.         Sometimes has trouble falling asleep, sometimes staying asleep-becoming more bothersome        Objective    Vitals:    10/06/21 1310   BP: 139/74   Pulse: 83   Temp: 96.8 °F (36 °C)   SpO2: 100%   Weight: 68.3 kg (150 lb 9.6 oz)   Height: 172.7 cm (68\")   PainSc: 0-No pain     BMI Readings from Last 1 Encounters:   10/06/21 22.90 kg/m²   BMI is within normal parameters. No follow-up required.    Does the patient have evidence of cognitive impairment? No    Physical Exam  Vitals and nursing note reviewed.   Constitutional:       General: He is not in acute distress.     Appearance: He is " well-developed. He is not ill-appearing.   HENT:      Head: Normocephalic and atraumatic.      Right Ear: Tympanic membrane, ear canal and external ear normal.      Left Ear: Tympanic membrane, ear canal and external ear normal.      Mouth/Throat:      Mouth: Mucous membranes are moist.      Pharynx: Uvula midline. No posterior oropharyngeal erythema.   Eyes:      General: No scleral icterus.        Right eye: No discharge.         Left eye: No discharge.      Conjunctiva/sclera: Conjunctivae normal.      Pupils: Pupils are equal, round, and reactive to light.   Neck:      Thyroid: No thyromegaly.   Cardiovascular:      Rate and Rhythm: Normal rate and regular rhythm.      Pulses:           Dorsalis pedis pulses are 0 on the right side and 1+ on the left side.      Heart sounds: Normal heart sounds. No murmur heard.     Pulmonary:      Effort: Pulmonary effort is normal.      Breath sounds: Normal breath sounds.   Abdominal:      General: Bowel sounds are normal.      Palpations: Abdomen is soft.      Tenderness: There is no abdominal tenderness.   Genitourinary:     Comments: Prostate enlarged, hard, not nodular  Musculoskeletal:         General: No deformity.      Cervical back: Neck supple.      Comments: Gait smooth and steady   Feet:      Right foot:      Protective Sensation: 10 sites tested. 0 sites sensed.      Skin integrity: Skin integrity normal.      Toenail Condition: Right toenails are normal.      Left foot:      Protective Sensation: 9 sites tested. 9 sites sensed.      Skin integrity: Skin integrity normal.      Toenail Condition: Left toenails are normal.      Comments: Flat feet b/l  Lymphadenopathy:      Cervical: No cervical adenopathy.   Skin:     General: Skin is warm and dry.      Comments: Mild erythema cleft buttocks-no s/s complications   Neurological:      General: No focal deficit present.      Mental Status: He is alert and oriented to person, place, and time.   Psychiatric:          Mood and Affect: Mood normal.         Behavior: Behavior normal.       Lab Results   Component Value Date    CHLPL 98 2021    TRIG 169 (H) 2021    HDL 31 (L) 2021    LDL 39 2021    VLDL 28 2021    HGBA1C 7.30 (H) 2021            HEALTH RISK ASSESSMENT    Smoking Status:  Social History     Tobacco Use   Smoking Status Current Some Day Smoker   • Packs/day: 1.00   • Years: 50.00   • Pack years: 50.00   • Types: Cigarettes   • Last attempt to quit: 2021   • Years since quittin.6   Smokeless Tobacco Never Used   Tobacco Comment    caffeine use     Alcohol Consumption:  Social History     Substance and Sexual Activity   Alcohol Use Yes   • Alcohol/week: 0.0 standard drinks    Comment: Occasional social     Fall Risk Screen:    STEADI Fall Risk Assessment was completed, and patient is at LOW risk for falls.Assessment completed on:10/6/2021    Depression Screening:  PHQ-2/PHQ-9 Depression Screening 10/6/2021   Little interest or pleasure in doing things 0   Feeling down, depressed, or hopeless 0   Trouble falling or staying asleep, or sleeping too much 3   Feeling tired or having little energy 0   Poor appetite or overeating 0   Feeling bad about yourself - or that you are a failure or have let yourself or your family down 0   Trouble concentrating on things, such as reading the newspaper or watching television 0   Moving or speaking so slowly that other people could have noticed. Or the opposite - being so fidgety or restless that you have been moving around a lot more than usual 0   Thoughts that you would be better off dead, or of hurting yourself in some way 0   Total Score 3   If you checked off any problems, how difficult have these problems made it for you to do your work, take care of things at home, or get along with other people? Not difficult at all       Health Habits and Functional and Cognitive Screening:  Functional & Cognitive Status 10/6/2021   Do you have  difficulty preparing food and eating? No   Do you have difficulty bathing yourself, getting dressed or grooming yourself? No   Do you have difficulty using the toilet? No   Do you have difficulty moving around from place to place? No   Do you have trouble with steps or getting out of a bed or a chair? No   Current Diet Well Balanced Diet   Dental Exam Up to date   Eye Exam Up to date   Exercise (times per week) 2 times per week   Current Exercise Activities Include -   Do you need help using the phone?  No   Are you deaf or do you have serious difficulty hearing?  No   Do you need help with transportation? No   Do you need help shopping? No   Do you need help preparing meals?  No   Do you need help with housework?  No   Do you need help with laundry? No   Do you need help taking your medications? No   Do you need help managing money? No   Do you ever drive or ride in a car without wearing a seat belt? No   Have you felt unusual stress, anger or loneliness in the last month? No   Who do you live with? Alone   If you need help, do you have trouble finding someone available to you? No   Have you been bothered in the last four weeks by sexual problems? -   Do you have difficulty concentrating, remembering or making decisions? No       Age-appropriate Screening Schedule:  Refer to the list below for future screening recommendations based on patient's age, sex and/or medical conditions. Orders for these recommended tests are listed in the plan section. The patient has been provided with a written plan.    Health Maintenance   Topic Date Due   • INFLUENZA VACCINE  09/01/2021   • URINE MICROALBUMIN  09/30/2021   • HEMOGLOBIN A1C  01/16/2022   • DIABETIC EYE EXAM  07/07/2022   • LIPID PANEL  07/16/2022   • DIABETIC FOOT EXAM  10/06/2022   • TDAP/TD VACCINES  Discontinued   • ZOSTER VACCINE  Discontinued              Assessment/Plan   CMS Preventative Services Quick Reference  Risk Factors Identified During Encounter  Tobacco  Use/Dependance (use dotphrase .tobaccocessation for documentation)  insomnia  The above risks/problems have been discussed with the patient.  Follow up actions/plans if indicated are seen below in the Assessment/Plan Section.  Pertinent information has been shared with the patient in the After Visit Summary.    Diagnoses and all orders for this visit:    1. Medicare annual wellness visit, subsequent (Primary)    2. Essential hypertension  -     Comprehensive Metabolic Panel; Future  -     Microalbumin / Creatinine Urine Ratio - Urine, Clean Catch; Future  -     TSH; Future  -     CBC & Differential; Future    3. Mixed hyperlipidemia  -     Lipid Panel With LDL / HDL Ratio; Future    4. Type 2 diabetes mellitus with diabetic neuropathy, without long-term current use of insulin (HCC)  -     Microalbumin / Creatinine Urine Ratio - Urine, Clean Catch; Future  -     Hemoglobin A1c; Future    5. PVD (peripheral vascular disease) ([facility])       As part of wellness and prevention, the following topics were discussed with the patient:   Nutrition-diet high in fresh/fozen veges, lower in carbs, unsaturated fats, and higher in lean proteins, adequate hydration   Physical activity/regular exercise-150 mins per week of moderate activity that increases HR and is enjoyable to lower stress and improve CVD     Healthy weight-at goal BMI discussed    Injury prevention-covid safety, back and joint health    Substance misuse/abuse-moderate etoh use   Dental health-recommend twice per year dental cleans and daily flossing to lower inflammation in body-goes to dentist Q 4 months   Mental health-stress mgmt and sleep hygiene   Immunization-utd on covid, has been given first flu vaccine he has ever had today  EGD upcoming  Has quit smoking for the most part-smokes occasionally if anxious or if having a couple drinks with buddies, or after good meal-no longer smoking daily    HTN:  Controlled well on current meds-continue-needs cmp  today  T2DM:  Foot exam done, foot care discussed, dont walk barefoot at home, will get A1C in appr 1 month-has been stable, following diet  PVD:  Stopped smoking, follows with vascular, leg pain improved, asa, plavix, statin-continue  SWAPNIL for prostate-declines medication for suspected BPH or urology referral  Right foot pain and neuropathy-improved somewhat with epidurals, now worsening, no epidural recommended by neuro  Insomnia:  For now will try melatonin and benadryl prn and if not helpful may need to try medication such as elavil, trazodone-TSH today, also screening thyroid due to vitiligo    Reviewed most recent epidural note and I do not see any indication that he cannot have another epidural prn.  [place] note also relieved indicating that he can have another epidural for worsening pain  Follow Up:   No follow-ups on file.     An After Visit Summary and PPPS were made available to the patient.

## 2021-10-11 ENCOUNTER — LAB (OUTPATIENT)
Dept: LAB | Facility: SURGERY CENTER | Age: 76
End: 2021-10-11

## 2021-10-11 DIAGNOSIS — Z01.818 OTHER SPECIFIED PRE-OPERATIVE EXAMINATION: ICD-10-CM

## 2021-10-11 LAB — SARS-COV-2 ORF1AB RESP QL NAA+PROBE: NOT DETECTED

## 2021-10-11 PROCEDURE — C9803 HOPD COVID-19 SPEC COLLECT: HCPCS

## 2021-10-11 PROCEDURE — U0004 COV-19 TEST NON-CDC HGH THRU: HCPCS | Performed by: SURGERY

## 2021-10-11 NOTE — SIGNIFICANT NOTE
Education provided the Patient on the following:    - Nothing to Eat or Drink after MN the night before the procedure  -Your required COVID Test is Scheduled on    10/11/21      Between the Hours of 8832-5500  -You will only be notified if your COVID test Result is POSITIVE  -The importance of reducing your number of contacts by self quarantining after you COVID test until the date of your EGD  -You will need to have someone drive you home after your EGD and remain with you for 24 hours after the EGD  - The date of your EGD, your are welcome to have one visitor at bedside or remain within 10-15 minutes of [facility]  -Please wear warm socks when you arrive for your EGD  -Remove all jewelry and leave any valuables before arriving on the date of your procedure (all will have to be removed before leaving preop)  -You will need to arrive at       0930    on     10/13/21      for your EGD  -Feel free to contact us at: [phone] with any additional questions/concerns

## 2021-10-13 ENCOUNTER — HOSPITAL ENCOUNTER (OUTPATIENT)
Facility: SURGERY CENTER | Age: 76
Setting detail: HOSPITAL OUTPATIENT SURGERY
Discharge: HOME | End: 2021-10-13
Attending: INTERNAL MEDICINE | Admitting: INTERNAL MEDICINE

## 2021-10-13 ENCOUNTER — ANESTHESIA EVENT (OUTPATIENT)
Dept: SURGERY | Facility: SURGERY CENTER | Age: 76
End: 2021-10-13

## 2021-10-13 ENCOUNTER — ANESTHESIA (OUTPATIENT)
Dept: SURGERY | Facility: SURGERY CENTER | Age: 76
End: 2021-10-13

## 2021-10-13 VITALS
BODY MASS INDEX: 22.11 KG/M2 | WEIGHT: 145.9 LBS | DIASTOLIC BLOOD PRESSURE: 74 MMHG | HEIGHT: 68 IN | RESPIRATION RATE: 16 BRPM | HEART RATE: 95 BPM | SYSTOLIC BLOOD PRESSURE: 120 MMHG | OXYGEN SATURATION: 97 % | TEMPERATURE: 97.5 F

## 2021-10-13 DIAGNOSIS — K22.70 BARRETT'S ESOPHAGUS WITHOUT DYSPLASIA: ICD-10-CM

## 2021-10-13 PROCEDURE — 25010000002 PROPOFOL 10 MG/ML EMULSION: Performed by: NURSE ANESTHETIST, CERTIFIED REGISTERED

## 2021-10-13 PROCEDURE — 43239 EGD BIOPSY SINGLE/MULTIPLE: CPT | Performed by: INTERNAL MEDICINE

## 2021-10-13 PROCEDURE — 0DB98ZZ EXCISION OF DUODENUM, VIA NATURAL OR ARTIFICIAL OPENING ENDOSCOPIC: ICD-10-PCS | Performed by: INTERNAL MEDICINE

## 2021-10-13 PROCEDURE — 88305 TISSUE EXAM BY PATHOLOGIST: CPT | Performed by: INTERNAL MEDICINE

## 2021-10-13 PROCEDURE — 25010000002 PHENYLEPHRINE 10 MG/ML SOLUTION: Performed by: NURSE ANESTHETIST, CERTIFIED REGISTERED

## 2021-10-13 RX ORDER — SODIUM CHLORIDE 0.9 % (FLUSH) 0.9 %
3 SYRINGE (ML) INJECTION EVERY 12 HOURS SCHEDULED
Status: DISCONTINUED | OUTPATIENT
Start: 2021-10-13 | End: 2021-10-13 | Stop reason: HOSPADM

## 2021-10-13 RX ORDER — SODIUM CHLORIDE, SODIUM LACTATE, POTASSIUM CHLORIDE, CALCIUM CHLORIDE 600; 310; 30; 20 MG/100ML; MG/100ML; MG/100ML; MG/100ML
30 INJECTION, SOLUTION INTRAVENOUS CONTINUOUS PRN
Status: DISCONTINUED | OUTPATIENT
Start: 2021-10-13 | End: 2021-10-13 | Stop reason: HOSPADM

## 2021-10-13 RX ORDER — PHENYLEPHRINE HYDROCHLORIDE 10 MG/ML
INJECTION INTRAVENOUS AS NEEDED
Status: DISCONTINUED | OUTPATIENT
Start: 2021-10-13 | End: 2021-10-13 | Stop reason: SURG

## 2021-10-13 RX ORDER — SODIUM CHLORIDE 0.9 % (FLUSH) 0.9 %
10 SYRINGE (ML) INJECTION AS NEEDED
Status: DISCONTINUED | OUTPATIENT
Start: 2021-10-13 | End: 2021-10-13 | Stop reason: HOSPADM

## 2021-10-13 RX ORDER — PROPOFOL 10 MG/ML
VIAL (ML) INTRAVENOUS AS NEEDED
Status: DISCONTINUED | OUTPATIENT
Start: 2021-10-13 | End: 2021-10-13 | Stop reason: SURG

## 2021-10-13 RX ORDER — LIDOCAINE HYDROCHLORIDE 20 MG/ML
INJECTION, SOLUTION INFILTRATION; PERINEURAL AS NEEDED
Status: DISCONTINUED | OUTPATIENT
Start: 2021-10-13 | End: 2021-10-13 | Stop reason: SURG

## 2021-10-13 RX ADMIN — PROPOFOL 100 MG: 10 INJECTION, EMULSION INTRAVENOUS at 11:02

## 2021-10-13 RX ADMIN — SODIUM CHLORIDE, POTASSIUM CHLORIDE, SODIUM LACTATE AND CALCIUM CHLORIDE 30 ML/HR: 600; 310; 30; 20 INJECTION, SOLUTION INTRAVENOUS at 10:00

## 2021-10-13 RX ADMIN — GLYCOPYRROLATE 0.2 MG: 0.2 INJECTION, SOLUTION INTRAMUSCULAR; INTRAVITREAL at 11:01

## 2021-10-13 RX ADMIN — PHENYLEPHRINE HYDROCHLORIDE 100 MCG: 10 INJECTION INTRAVENOUS at 11:03

## 2021-10-13 RX ADMIN — LIDOCAINE HYDROCHLORIDE 60 MG: 20 INJECTION, SOLUTION INFILTRATION; PERINEURAL at 11:02

## 2021-10-13 RX ADMIN — PROPOFOL 50 MG: 10 INJECTION, EMULSION INTRAVENOUS at 11:05

## 2021-10-13 NOTE — ANESTHESIA PREPROCEDURE EVALUATION
Anesthesia Evaluation     Patient summary reviewed and Nursing notes reviewed   no history of anesthetic complications:  NPO Solid Status: > 8 hours  NPO Liquid Status: > 2 hours           Airway   Mallampati: II  TM distance: >3 FB  Neck ROM: full  No difficulty expected  Dental    (+) upper dentures        Pulmonary - normal exam   (+) a smoker Current Smoked day of surgery,   Cardiovascular - normal exam    PT is on anticoagulation therapy  Patient on routine beta blocker and Beta blocker given within 24 hours of surgery    (+) hypertension, past MI (x2, 1996 and somewhat recently, more than a year ago)  >12 months, CAD, cardiac stents more than 12 months ago PVD (claudication), hyperlipidemia,       Neuro/Psych  (+) numbness (right foot numbness /sciatica),     GI/Hepatic/Renal/Endo    (+)  GERD well controlled,  diabetes mellitus type 2,     Musculoskeletal     (+) back pain,   Abdominal    Substance History      OB/GYN          Other        ROS/Med Hx Other: Stress test 3/21    · Myocardial perfusion imaging indicates a medium-sized infarct located in the lateral wall with no significant ischemia noted.  · Left ventricular ejection fraction is mildly reduced. (Calculated EF = 40%). Abnormal LV wall motion consistent with akinesis.    INTERMEDIATE cardiac risk per cardiology, cleared for surgery      Phys Exam Other: Periodontal disease                Anesthesia Plan    ASA 3     MAC   (  )    Anesthetic plan, all risks, benefits, and alternatives have been provided, discussed and informed consent has been obtained with: patient.    Plan discussed with CRNA.

## 2021-10-13 NOTE — ANESTHESIA POSTPROCEDURE EVALUATION
"Patient: Niraj Hassan    Procedure Summary     Date: 10/13/21 Room / Location: [facility]    Anesthesia Start: 1059 Anesthesia Stop: 1114    Procedure: ESOPHAGOGASTRODUODENOSCOPY WITH BIOPSIES (N/A ) Diagnosis:       Abreu's esophagus without dysplasia      (Abreu's esophagus without dysplasia [K22.70])    Surgeons: Gildardo Whelan MD Provider: Mayra Alonso MD    Anesthesia Type: MAC ASA Status: 3          Anesthesia Type: MAC    Vitals  Vitals Value Taken Time   /74 10/13/21 1126   Temp 36.4 °C (97.5 °F) 10/13/21 1113   Pulse 95 10/13/21 1126   Resp 16 10/13/21 1126   SpO2 97 % 10/13/21 1126           Post Anesthesia Care and Evaluation    Patient location during evaluation: PHASE II  Patient participation: complete - patient participated  Level of consciousness: awake  Pain management: adequate  Airway patency: patent  Anesthetic complications: No anesthetic complications    Cardiovascular status: acceptable  Respiratory status: acceptable  Hydration status: acceptable    Comments: /74 (BP Location: Left arm, Patient Position: Lying)   Pulse 95   Temp 36.4 °C (97.5 °F) (Infrared)   Resp 16   Ht 172.7 cm (68\")   Wt 66.2 kg (145 lb 14.4 oz)   SpO2 97%   BMI 22.18 kg/m²       "

## 2021-10-13 NOTE — H&P
No chief complaint on file.      HPI  Here today for an EGD for follow-up of Abreu's esophagus.         Problem List:    Patient Active Problem List   Diagnosis   • Essential hypertension   • Mixed hyperlipidemia   • Type 2 diabetes mellitus without complication, without long-term current use of insulin ([facility])   • Slow urinary stream   • Sciatica of right side   • Medicare annual wellness visit, initial   • Abnormal ankle brachial index (CAT)   • Claudication ([facility])   • CAD (coronary artery disease)   • GERD (gastroesophageal reflux disease)   • Type 2 diabetes mellitus with diabetic neuropathy, without long-term current use of insulin ([facility])   • Vitiligo   • Back pain   • Numbness of right foot   • PVD (peripheral vascular disease) ([facility])   • DDD (degenerative disc disease), lumbar   • Lumbar neuritis   • Facet arthropathy, lumbar   • Abreu's esophagus without dysplasia       Medical History:    Past Medical History:   Diagnosis Date   • Allergic     Had for years. Mold is the main.   • CAD (coronary artery disease)    • DDD (degenerative disc disease), lumbar 2021   • Diabetes mellitus ([facility])    • GERD (gastroesophageal reflux disease)    • Hyperlipidemia    • Hypertension    • Lumbar neuritis 2021   • Myocardial infarction ([facility]) ,    • Osteoarthritis    • PVD (peripheral vascular disease) ([facility])    • Sciatic nerve pain         Social History:    Social History     Socioeconomic History   • Marital status:    Tobacco Use   • Smoking status: Current Some Day Smoker     Packs/day: 0.25     Years: 50.00     Pack years: 12.50     Types: Cigarettes     Last attempt to quit: 2021     Years since quittin.6   • Smokeless tobacco: Never Used   • Tobacco comment: caffeine use   Vaping Use   • Vaping Use: Never used   Substance and Sexual Activity   • Alcohol use: Yes     Alcohol/week: 0.0 standard drinks     Comment: Occasional social   • Drug use: No   • Sexual activity: Not Currently      Partners: Female       Family History:   Family History   Problem Relation Age of Onset   • Heart attack Mother    • Cancer Mother         Passes in 1984   • Heart disease Mother         Five heart attacks. Pacemaker   • Heart attack Father    • Heart disease Father         Passed from heart attack in 1998   • Colon polyps Neg Hx    • Colon cancer Neg Hx    • Malig Hyperthermia Neg Hx        Surgical History:   Past Surgical History:   Procedure Laterality Date   • ARTERIAL BYPASS SURGERY      RIGHT LEG   • CARDIAC CATHETERIZATION      showed total occlusion of the RCA, mild left main disease, and moderate disease of the circumflex.   • COLONOSCOPY     • CORONARY ANGIOPLASTY WITH STENT PLACEMENT      angioplasty and stent placement to the circumflex   • ENDOSCOPY     • FEMORAL POPLITEAL BYPASS Right 3/12/2021    Procedure: RIGHT LEG BYPASS GRAFT REVISON;  Surgeon: Dharmesh Collazo MD;  Location: [facility];  Service: Vascular;  Laterality: Right;         Current Facility-Administered Medications:   •  lactated ringers infusion, 30 mL/hr, Intravenous, Continuous PRN, Gildardo Whelan MD, Last Rate: 30 mL/hr at 10/13/21 1000, 30 mL/hr at 10/13/21 1000  •  sodium chloride 0.9 % flush 10 mL, 10 mL, Intravenous, PRN, Gildardo Whelan MD  •  sodium chloride 0.9 % flush 3 mL, 3 mL, Intravenous, Q12H, Gildardo Whelan MD    Allergies: No Known Allergies     The following portions of the patient's history were reviewed by me and updated as appropriate: review of systems, allergies, current medications, past family history, past medical history, past social history, past surgical history and problem list.    Vitals:    10/13/21 0953   BP: 159/82   Pulse: 81   Resp: 20   Temp: 96.9 °F (36.1 °C)   SpO2: 99%       PHYSICAL EXAM:    CONSTITUTIONAL:  today's vital signs reviewed by me  GASTROINTESTINAL: abdomen is soft nontender nondistended with normal active bowel sounds, no masses are appreciated    Assessment/  Plan  We will proceed today with EGD.    Risks and benefits as well as alternatives to endoscopic evaluation were explained to the patient and they voiced understanding and wish to proceed.  These risks include but are not limited to the risk of bleeding, perforation, adverse reaction to sedation, and missed lesions.  The patient was given the opportunity to ask questions prior to the endoscopic procedure.

## 2021-10-14 LAB
LAB AP CASE REPORT: NORMAL
LAB AP CLINICAL INFORMATION: NORMAL
PATH REPORT.FINAL DX SPEC: NORMAL
PATH REPORT.GROSS SPEC: NORMAL

## 2021-10-25 DIAGNOSIS — E11.69 HYPERLIPIDEMIA ASSOCIATED WITH TYPE 2 DIABETES MELLITUS (HCC): ICD-10-CM

## 2021-10-25 DIAGNOSIS — E78.1 HIGH TRIGLYCERIDES: ICD-10-CM

## 2021-10-25 DIAGNOSIS — E78.5 HYPERLIPIDEMIA ASSOCIATED WITH TYPE 2 DIABETES MELLITUS (HCC): ICD-10-CM

## 2021-10-25 RX ORDER — ATORVASTATIN CALCIUM 40 MG/1
TABLET, FILM COATED ORAL
Qty: 90 TABLET | Refills: 0 | Status: SHIPPED | OUTPATIENT
Start: 2021-10-25 | End: 2021-11-15

## 2021-10-26 DIAGNOSIS — I10 ESSENTIAL HYPERTENSION: ICD-10-CM

## 2021-10-26 DIAGNOSIS — E11.40 TYPE 2 DIABETES MELLITUS WITH DIABETIC NEUROPATHY, WITHOUT LONG-TERM CURRENT USE OF INSULIN (HCC): ICD-10-CM

## 2021-10-26 DIAGNOSIS — E78.2 MIXED HYPERLIPIDEMIA: ICD-10-CM

## 2021-11-03 LAB
ALBUMIN SERPL-MCNC: 4.4 G/DL (ref 3.7–4.7)
ALBUMIN/CREAT UR: 8 MG/G CREAT (ref 0–29)
ALBUMIN/GLOB SERPL: 1.8 {RATIO} (ref 1.2–2.2)
ALP SERPL-CCNC: 96 IU/L (ref 44–121)
ALT SERPL-CCNC: 17 IU/L (ref 0–44)
AST SERPL-CCNC: 16 IU/L (ref 0–40)
BASOPHILS # BLD AUTO: 0 X10E3/UL (ref 0–0.2)
BASOPHILS NFR BLD AUTO: 0 %
BILIRUB SERPL-MCNC: 0.3 MG/DL (ref 0–1.2)
BUN SERPL-MCNC: 18 MG/DL (ref 8–27)
BUN/CREAT SERPL: 16 (ref 10–24)
CALCIUM SERPL-MCNC: 10 MG/DL (ref 8.6–10.2)
CHLORIDE SERPL-SCNC: 104 MMOL/L (ref 96–106)
CHOLEST SERPL-MCNC: 116 MG/DL (ref 100–199)
CO2 SERPL-SCNC: 20 MMOL/L (ref 20–29)
CREAT SERPL-MCNC: 1.12 MG/DL (ref 0.76–1.27)
CREAT UR-MCNC: 143.9 MG/DL
EOSINOPHIL # BLD AUTO: 0.4 X10E3/UL (ref 0–0.4)
EOSINOPHIL NFR BLD AUTO: 4 %
ERYTHROCYTE [DISTWIDTH] IN BLOOD BY AUTOMATED COUNT: 12 % (ref 11.6–15.4)
GLOBULIN SER CALC-MCNC: 2.5 G/DL (ref 1.5–4.5)
GLUCOSE SERPL-MCNC: 135 MG/DL (ref 65–99)
HBA1C MFR BLD: 6.8 % (ref 4.8–5.6)
HCT VFR BLD AUTO: 41.1 % (ref 37.5–51)
HDLC SERPL-MCNC: 34 MG/DL
HGB BLD-MCNC: 13.9 G/DL (ref 13–17.7)
IMM GRANULOCYTES # BLD AUTO: 0 X10E3/UL (ref 0–0.1)
IMM GRANULOCYTES NFR BLD AUTO: 0 %
LDLC SERPL CALC-MCNC: 42 MG/DL (ref 0–99)
LDLC/HDLC SERPL: 1.2 RATIO (ref 0–3.6)
LYMPHOCYTES # BLD AUTO: 3.9 X10E3/UL (ref 0.7–3.1)
LYMPHOCYTES NFR BLD AUTO: 40 %
MCH RBC QN AUTO: 32 PG (ref 26.6–33)
MCHC RBC AUTO-ENTMCNC: 33.8 G/DL (ref 31.5–35.7)
MCV RBC AUTO: 95 FL (ref 79–97)
MICROALBUMIN UR-MCNC: 11.9 UG/ML
MONOCYTES # BLD AUTO: 0.9 X10E3/UL (ref 0.1–0.9)
MONOCYTES NFR BLD AUTO: 9 %
NEUTROPHILS # BLD AUTO: 4.4 X10E3/UL (ref 1.4–7)
NEUTROPHILS NFR BLD AUTO: 47 %
PLATELET # BLD AUTO: 245 X10E3/UL (ref 150–450)
POTASSIUM SERPL-SCNC: 4.4 MMOL/L (ref 3.5–5.2)
PROT SERPL-MCNC: 6.9 G/DL (ref 6–8.5)
RBC # BLD AUTO: 4.34 X10E6/UL (ref 4.14–5.8)
SODIUM SERPL-SCNC: 141 MMOL/L (ref 134–144)
TRIGL SERPL-MCNC: 257 MG/DL (ref 0–149)
TSH SERPL DL<=0.005 MIU/L-ACNC: 1.89 UIU/ML (ref 0.45–4.5)
VLDLC SERPL CALC-MCNC: 40 MG/DL (ref 5–40)
WBC # BLD AUTO: 9.8 X10E3/UL (ref 3.4–10.8)

## 2021-11-11 DIAGNOSIS — E78.5 HYPERLIPIDEMIA ASSOCIATED WITH TYPE 2 DIABETES MELLITUS (HCC): ICD-10-CM

## 2021-11-11 DIAGNOSIS — E11.69 HYPERLIPIDEMIA ASSOCIATED WITH TYPE 2 DIABETES MELLITUS (HCC): ICD-10-CM

## 2021-11-11 DIAGNOSIS — E78.1 HIGH TRIGLYCERIDES: ICD-10-CM

## 2021-11-12 NOTE — TELEPHONE ENCOUNTER
Rx Refill Note  Requested Prescriptions     Pending Prescriptions Disp Refills   • atorvastatin (LIPITOR) 40 MG tablet [Pharmacy Med Name: ATORVASTATIN 40 MG TABLET] 90 tablet 0     Sig: TAKE ONE TABLET BY MOUTH DAILY      Last office visit with prescribing clinician: 10/06/2021     Next office visit with prescribing clinician: Visit date not found            Vanessa Carbajal Rep  11/12/21, 15:33 EST

## 2021-11-15 RX ORDER — ATORVASTATIN CALCIUM 40 MG/1
TABLET, FILM COATED ORAL
Qty: 90 TABLET | Refills: 1 | Status: SHIPPED | OUTPATIENT
Start: 2021-11-15 | End: 2022-01-07

## 2022-01-07 ENCOUNTER — TRANSCRIBE ORDERS (OUTPATIENT)
Dept: ADMINISTRATIVE | Facility: HOSPITAL | Age: 77
End: 2022-01-07

## 2022-01-07 DIAGNOSIS — I70.421: ICD-10-CM

## 2022-01-07 DIAGNOSIS — E78.5 HYPERLIPIDEMIA ASSOCIATED WITH TYPE 2 DIABETES MELLITUS: ICD-10-CM

## 2022-01-07 DIAGNOSIS — I73.9 RIGHT LEG CLAUDICATION: Primary | ICD-10-CM

## 2022-01-07 DIAGNOSIS — E11.69 HYPERLIPIDEMIA ASSOCIATED WITH TYPE 2 DIABETES MELLITUS: ICD-10-CM

## 2022-01-07 DIAGNOSIS — E78.1 HIGH TRIGLYCERIDES: ICD-10-CM

## 2022-01-07 RX ORDER — ATORVASTATIN CALCIUM 40 MG/1
TABLET, FILM COATED ORAL
Qty: 90 TABLET | Refills: 1 | Status: SHIPPED | OUTPATIENT
Start: 2022-01-07 | End: 2022-06-02

## 2022-01-10 RX ORDER — CLOPIDOGREL BISULFATE 75 MG/1
TABLET ORAL
Qty: 90 TABLET | Refills: 1 | Status: ON HOLD | OUTPATIENT
Start: 2022-01-10 | End: 2022-02-25

## 2022-01-10 NOTE — TELEPHONE ENCOUNTER
Rx Refill Note  Requested Prescriptions     Pending Prescriptions Disp Refills   • clopidogrel (PLAVIX) 75 MG tablet [Pharmacy Med Name: CLOPIDOGREL 75 MG Tablet] 90 tablet 1     Sig: TAKE 1 TABLET EVERY DAY      Last office visit with prescribing clinician: 10/6/2021      Next office visit with prescribing clinician: Visit date not found            Debbie Avila MA  01/10/22, 10:21 EST

## 2022-01-11 ENCOUNTER — HOSPITAL ENCOUNTER (OUTPATIENT)
Dept: CT IMAGING | Facility: HOSPITAL | Age: 77
Discharge: HOME | End: 2022-01-11
Admitting: SURGERY

## 2022-01-11 DIAGNOSIS — I70.421: ICD-10-CM

## 2022-01-11 DIAGNOSIS — I73.9 RIGHT LEG CLAUDICATION: ICD-10-CM

## 2022-01-11 LAB — CREAT BLDA-MCNC: 0.9 MG/DL (ref 0.6–1.3)

## 2022-01-11 PROCEDURE — 0 IOPAMIDOL PER 1 ML: Performed by: SURGERY

## 2022-01-11 PROCEDURE — 75635 CT ANGIO ABDOMINAL ARTERIES: CPT

## 2022-01-11 PROCEDURE — 82565 ASSAY OF CREATININE: CPT

## 2022-01-11 RX ADMIN — IOPAMIDOL 98 ML: 755 INJECTION, SOLUTION INTRAVENOUS at 11:01

## 2022-01-12 DIAGNOSIS — I10 ESSENTIAL HYPERTENSION: ICD-10-CM

## 2022-01-12 RX ORDER — LISINOPRIL AND HYDROCHLOROTHIAZIDE 20; 12.5 MG/1; MG/1
TABLET ORAL
Qty: 90 TABLET | Refills: 1 | Status: SHIPPED | OUTPATIENT
Start: 2022-01-12 | End: 2022-06-08

## 2022-01-12 NOTE — TELEPHONE ENCOUNTER
Rx Refill Note  Requested Prescriptions     Pending Prescriptions Disp Refills   • lisinopril-hydrochlorothiazide (PRINZIDE,ZESTORETIC) 20-12.5 MG per tablet [Pharmacy Med Name: LISINOPRIL/HYDROCHLOROTHIAZIDE 20-12.5 MG Tablet] 90 tablet 1     Sig: TAKE 1 TABLET EVERY DAY      Last office visit with prescribing clinician: 10/6/2021      Next office visit with prescribing clinician: Visit date not found            Marie Pena MA  01/12/22, 10:23 EST

## 2022-01-17 ENCOUNTER — PRE-ADMISSION TESTING (OUTPATIENT)
Dept: PREADMISSION TESTING | Facility: HOSPITAL | Age: 77
End: 2022-01-17

## 2022-01-17 VITALS
SYSTOLIC BLOOD PRESSURE: 162 MMHG | BODY MASS INDEX: 22.61 KG/M2 | HEART RATE: 78 BPM | RESPIRATION RATE: 16 BRPM | OXYGEN SATURATION: 98 % | TEMPERATURE: 97.8 F | HEIGHT: 68 IN | DIASTOLIC BLOOD PRESSURE: 98 MMHG | WEIGHT: 149.2 LBS

## 2022-01-17 LAB
ANION GAP SERPL CALCULATED.3IONS-SCNC: 13.4 MMOL/L (ref 5–15)
BUN SERPL-MCNC: 19 MG/DL (ref 8–23)
BUN/CREAT SERPL: 22.6 (ref 7–25)
CALCIUM SPEC-SCNC: 9.3 MG/DL (ref 8.6–10.5)
CHLORIDE SERPL-SCNC: 107 MMOL/L (ref 98–107)
CO2 SERPL-SCNC: 22.6 MMOL/L (ref 22–29)
CREAT SERPL-MCNC: 0.84 MG/DL (ref 0.76–1.27)
DEPRECATED RDW RBC AUTO: 41.4 FL (ref 37–54)
ERYTHROCYTE [DISTWIDTH] IN BLOOD BY AUTOMATED COUNT: 11.9 % (ref 12.3–15.4)
GFR SERPL CREATININE-BSD FRML MDRD: 89 ML/MIN/1.73
GLUCOSE SERPL-MCNC: 168 MG/DL (ref 65–99)
HCT VFR BLD AUTO: 38.5 % (ref 37.5–51)
HGB BLD-MCNC: 13 G/DL (ref 13–17.7)
MCH RBC QN AUTO: 32 PG (ref 26.6–33)
MCHC RBC AUTO-ENTMCNC: 33.8 G/DL (ref 31.5–35.7)
MCV RBC AUTO: 94.8 FL (ref 79–97)
PLATELET # BLD AUTO: 242 10*3/MM3 (ref 140–450)
PMV BLD AUTO: 10.4 FL (ref 6–12)
POTASSIUM SERPL-SCNC: 4.8 MMOL/L (ref 3.5–5.2)
QT INTERVAL: 413 MS
RBC # BLD AUTO: 4.06 10*6/MM3 (ref 4.14–5.8)
SARS-COV-2 ORF1AB RESP QL NAA+PROBE: NOT DETECTED
SODIUM SERPL-SCNC: 143 MMOL/L (ref 136–145)
WBC NRBC COR # BLD: 8.5 10*3/MM3 (ref 3.4–10.8)

## 2022-01-17 PROCEDURE — U0004 COV-19 TEST NON-CDC HGH THRU: HCPCS

## 2022-01-17 PROCEDURE — 80048 BASIC METABOLIC PNL TOTAL CA: CPT

## 2022-01-17 PROCEDURE — C9803 HOPD COVID-19 SPEC COLLECT: HCPCS

## 2022-01-17 PROCEDURE — 93005 ELECTROCARDIOGRAM TRACING: CPT

## 2022-01-17 PROCEDURE — 85027 COMPLETE CBC AUTOMATED: CPT

## 2022-01-17 PROCEDURE — 36415 COLL VENOUS BLD VENIPUNCTURE: CPT

## 2022-01-17 PROCEDURE — 93010 ELECTROCARDIOGRAM REPORT: CPT | Performed by: INTERNAL MEDICINE

## 2022-01-17 NOTE — DISCHARGE INSTRUCTIONS
Take the following medications the morning of surgery: METOPROLOL, PREVACID    ARRIVAL TIME :530AM      General Instructions:  • Do not eat solid food after midnight the night before surgery.  • You may drink clear liquids day of surgery but must stop at least one hour before your hospital arrival time. 430AM  It is beneficial for you to have a clear drink that contains carbohydrates the day of surgery.  We suggest a 12 to 20 ounce bottle of Gatorade or Powerade for non-diabetic patients or a 12 to 20 ounce bottle of G2 or Powerade Zero for diabetic patients. (Pediatric patients, are not advised to drink a 12 to 20 ounce carbohydrate drink)    Clear liquids are liquids you can see through.  Nothing red in color.     Plain water                               Sports drinks  Sodas                                   Gelatin (Jell-O)  Fruit juices without pulp such as white grape juice and apple juice  Popsicles that contain no fruit or yogurt  Tea or coffee (no cream or milk added)  Gatorade / Powerade  G2 / Powerade Zero      • Patients who avoid smoking, chewing tobacco and alcohol for 4 weeks prior to surgery have a reduced risk of post-operative complications.  Quit smoking as many days before surgery as you can.  • Do not smoke, use chewing tobacco or drink alcohol the day of surgery.   • If applicable bring your C-PAP/ BI-PAP machine.  • Bring any papers given to you in the doctor’s office.  • Wear clean comfortable clothes.  • Do not wear contact lenses, false eyelashes or make-up.  Bring a case for your glasses.  • Remove all piercings.  Leave jewelry and any other valuables at home.  • Hair extensions with metal clips must be removed prior to surgery.  • The Pre-Admission Testing nurse will instruct you to bring medications if unable to obtain an accurate list in Pre-Admission Testing.        Preventing a Surgical Site Infection:  • For 2 to 3 days before surgery, avoid shaving with a razor because the razor can  irritate skin and make it easier to develop an infection.    • Any areas of open skin can increase the risk of a post-operative wound infection by allowing bacteria to enter and travel throughout the body.  Notify your surgeon if you have any skin wounds / rashes even if it is not near the expected surgical site.  The area will need assessed to determine if surgery should be delayed until it is healed.  • The night prior to surgery shower using a fresh bar of anti-bacterial soap (such as Dial) and clean washcloth.  Sleep in a clean bed with clean clothing.  Do not allow pets to sleep with you.  • Shower on the morning of surgery using a fresh bar of anti-bacterial soap (such as Dial) and clean washcloth.  Dry with a clean towel and dress in clean clothing.  • Ask your surgeon if you will be receiving antibiotics prior to surgery.  • Make sure you, your family, and all healthcare providers clean their hands with soap and water or an alcohol based hand  before caring for you or your wound.    Day of surgery:  Your arrival time is approximately two hours before your scheduled surgery time.  Upon arrival, a Pre-op nurse and Anesthesiologist will review your health history, obtain vital signs, and answer questions you may have.  The only belongings needed at this time will be a list of your home medications and if applicable your C-PAP/BI-PAP machine.  A Pre-op nurse will start an IV and you may receive medication in preparation for surgery, including something to help you relax.     Please be aware that surgery does come with discomfort.  We want to make every effort to control your discomfort so please discuss any uncontrolled symptoms with your nurse.   Your doctor will most likely have prescribed pain medications.      If you are going home after surgery you will receive individualized written care instructions before being discharged.  A responsible adult must drive you to and from the hospital on the day  of your surgery and stay with you for 24 hours.  Discharge prescriptions can be filled by the hospital pharmacy during regular pharmacy hours.  If you are having surgery late in the day/evening your prescription may be e-prescribed to your pharmacy.  Please verify your pharmacy hours or chose a 24 hour pharmacy to avoid not having access to your prescription because your pharmacy has closed for the day.    If you are staying overnight following surgery, you will be transported to your hospital room following the recovery period.  [facility] has all private rooms.    If you have any questions please call Pre-Admission Testing at [phone].  Deductibles and co-payments are collected on the day of service. Please be prepared to pay the required co-pay, deductible or deposit on the day of service as defined by your plan.    Patient Education for Self-Quarantine Process    • Following your COVID testing, we strongly recommend that you wear a mask when you are with other people and practice social distancing.   • Limit your activities to only required outings.  • Wash your hands with soap and water frequently for at least 20 seconds.   • Avoid touching your eyes, nose and mouth with unwashed hands.  • Do not share anything - utensils, drinking glasses, food from the same bowl.   • Sanitize household surfaces daily. Include all high touch areas (door handles, light switches, phones, countertops, etc.)    Call your surgeon immediately if you experience any of the following symptoms:  • Sore Throat  • Shortness of Breath or difficulty breathing  • Cough  • Chills  • Body soreness or muscle pain  • Headache  • Fever  • New loss of taste or smell  Do not arrive for your surgery ill.  Your procedure will need to be rescheduled to another time.  You will need to call your physician before the day of surgery to avoid any unnecessary exposure to hospital staff as well as other patients.      CHLORHEXIDINE CLOTH  INSTRUCTIONS  The morning of surgery follow these instructions using the Chlorhexidine cloths you've been given.  These steps reduce bacteria on the body.  Do not use the cloths near your eyes, ears mouth, genitalia or on open wounds.  Throw the cloths away after use but do not try to flush them down a toilet.      • Open and remove one cloth at a time from the package.    • Leave the cloth unfolded and begin the bathing.  • Massage the skin with the cloths using gentle pressure to remove bacteria.  Do not scrub harshly.   • Follow the steps below with one 2% CHG cloth per area (6 total cloths).  • One cloth for neck, shoulders and chest.  • One cloth for both arms, hands, fingers and underarms (do underarms last).  • One cloth for the abdomen followed by groin.  • One cloth for right leg and foot including between the toes.  • One cloth for left leg and foot including between the toes.  • The last cloth is to be used for the back of the neck, back and buttocks.    Allow the CHG to air dry 3 minutes on the skin which will give it time to work and decrease the chance of irritation.  The skin may feel sticky until it is dry.  Do not rinse with water or any other liquid or you will lose the beneficial effects of the CHG.  If mild skin irritation occurs, do rinse the skin to remove the CHG.  Report this to the nurse at time of admission.  Do not apply lotions, creams, ointments, deodorants or perfumes after using the clothes. Dress in clean clothes before coming to the hospital.

## 2022-01-19 ENCOUNTER — APPOINTMENT (OUTPATIENT)
Dept: GENERAL RADIOLOGY | Facility: HOSPITAL | Age: 77
End: 2022-01-19

## 2022-01-19 ENCOUNTER — ANESTHESIA (OUTPATIENT)
Dept: PERIOP | Facility: HOSPITAL | Age: 77
End: 2022-01-19

## 2022-01-19 ENCOUNTER — ANESTHESIA EVENT (OUTPATIENT)
Dept: PERIOP | Facility: HOSPITAL | Age: 77
End: 2022-01-19

## 2022-01-19 ENCOUNTER — HOSPITAL ENCOUNTER (OUTPATIENT)
Facility: HOSPITAL | Age: 77
Setting detail: HOSPITAL OUTPATIENT SURGERY
Discharge: HOME | End: 2022-01-19
Attending: SURGERY | Admitting: SURGERY

## 2022-01-19 VITALS
OXYGEN SATURATION: 99 % | TEMPERATURE: 97.7 F | SYSTOLIC BLOOD PRESSURE: 147 MMHG | DIASTOLIC BLOOD PRESSURE: 91 MMHG | RESPIRATION RATE: 18 BRPM | HEART RATE: 102 BPM

## 2022-01-19 DIAGNOSIS — I73.9 PVD (PERIPHERAL VASCULAR DISEASE): Primary | ICD-10-CM

## 2022-01-19 LAB
GLUCOSE BLDC GLUCOMTR-MCNC: 141 MG/DL (ref 70–130)
GLUCOSE BLDC GLUCOMTR-MCNC: 198 MG/DL (ref 70–130)

## 2022-01-19 PROCEDURE — 25010000002 PHENYLEPHRINE 10 MG/ML SOLUTION: Performed by: NURSE ANESTHETIST, CERTIFIED REGISTERED

## 2022-01-19 PROCEDURE — 0 LIDOCAINE 1 % SOLUTION 20 ML VIAL: Performed by: SURGERY

## 2022-01-19 PROCEDURE — 25010000002 PROPOFOL 10 MG/ML EMULSION: Performed by: NURSE ANESTHETIST, CERTIFIED REGISTERED

## 2022-01-19 PROCEDURE — 25010000002 HEPARIN (PORCINE) PER 1000 UNITS: Performed by: NURSE ANESTHETIST, CERTIFIED REGISTERED

## 2022-01-19 PROCEDURE — 0 IOPAMIDOL PER 1 ML: Performed by: SURGERY

## 2022-01-19 PROCEDURE — C1725 CATH, TRANSLUMIN NON-LASER: HCPCS | Performed by: SURGERY

## 2022-01-19 PROCEDURE — 25010000002 FENTANYL CITRATE (PF) 50 MCG/ML SOLUTION: Performed by: ANESTHESIOLOGY

## 2022-01-19 PROCEDURE — 82962 GLUCOSE BLOOD TEST: CPT

## 2022-01-19 PROCEDURE — 0 CEFAZOLIN IN DEXTROSE 2-4 GM/100ML-% SOLUTION: Performed by: SURGERY

## 2022-01-19 PROCEDURE — 25010000002 HEPARIN (PORCINE) PER 1000 UNITS: Performed by: SURGERY

## 2022-01-19 PROCEDURE — C1769 GUIDE WIRE: HCPCS | Performed by: SURGERY

## 2022-01-19 PROCEDURE — C1760 CLOSURE DEV, VASC: HCPCS | Performed by: SURGERY

## 2022-01-19 PROCEDURE — C1894 INTRO/SHEATH, NON-LASER: HCPCS | Performed by: SURGERY

## 2022-01-19 PROCEDURE — C1876 STENT, NON-COA/NON-COV W/DEL: HCPCS | Performed by: SURGERY

## 2022-01-19 PROCEDURE — 75710 ARTERY X-RAYS ARM/LEG: CPT

## 2022-01-19 PROCEDURE — C1887 CATHETER, GUIDING: HCPCS | Performed by: SURGERY

## 2022-01-19 DEVICE — STENT SERB65-10-80-80 PROTEGE GPS V06
Type: IMPLANTABLE DEVICE | Site: ARTERY ILIAC | Status: FUNCTIONAL
Brand: PROTÉGÉ™ GPS™

## 2022-01-19 RX ORDER — FLUMAZENIL 0.1 MG/ML
0.2 INJECTION INTRAVENOUS AS NEEDED
Status: DISCONTINUED | OUTPATIENT
Start: 2022-01-19 | End: 2022-01-19 | Stop reason: HOSPADM

## 2022-01-19 RX ORDER — CEFAZOLIN SODIUM 2 G/100ML
2 INJECTION, SOLUTION INTRAVENOUS ONCE
Status: COMPLETED | OUTPATIENT
Start: 2022-01-19 | End: 2022-01-19

## 2022-01-19 RX ORDER — BUPIVACAINE HCL/0.9 % NACL/PF 0.1 %
2 PLASTIC BAG, INJECTION (ML) EPIDURAL EVERY 8 HOURS SCHEDULED
Status: DISCONTINUED | OUTPATIENT
Start: 2022-01-19 | End: 2022-01-19

## 2022-01-19 RX ORDER — PHENYLEPHRINE HYDROCHLORIDE 10 MG/ML
INJECTION INTRAVENOUS AS NEEDED
Status: DISCONTINUED | OUTPATIENT
Start: 2022-01-19 | End: 2022-01-19 | Stop reason: SURG

## 2022-01-19 RX ORDER — OXYCODONE AND ACETAMINOPHEN 7.5; 325 MG/1; MG/1
1 TABLET ORAL EVERY 4 HOURS PRN
Status: DISCONTINUED | OUTPATIENT
Start: 2022-01-19 | End: 2022-01-19 | Stop reason: HOSPADM

## 2022-01-19 RX ORDER — MIDAZOLAM HYDROCHLORIDE 1 MG/ML
0.5 INJECTION INTRAMUSCULAR; INTRAVENOUS
Status: DISCONTINUED | OUTPATIENT
Start: 2022-01-19 | End: 2022-01-19 | Stop reason: HOSPADM

## 2022-01-19 RX ORDER — FENTANYL CITRATE 50 UG/ML
50 INJECTION, SOLUTION INTRAMUSCULAR; INTRAVENOUS
Status: DISCONTINUED | OUTPATIENT
Start: 2022-01-19 | End: 2022-01-19 | Stop reason: HOSPADM

## 2022-01-19 RX ORDER — SODIUM CHLORIDE 0.9 % (FLUSH) 0.9 %
3 SYRINGE (ML) INJECTION EVERY 12 HOURS SCHEDULED
Status: DISCONTINUED | OUTPATIENT
Start: 2022-01-19 | End: 2022-01-19 | Stop reason: HOSPADM

## 2022-01-19 RX ORDER — EPHEDRINE SULFATE 50 MG/ML
5 INJECTION, SOLUTION INTRAVENOUS ONCE AS NEEDED
Status: DISCONTINUED | OUTPATIENT
Start: 2022-01-19 | End: 2022-01-19 | Stop reason: HOSPADM

## 2022-01-19 RX ORDER — HYDRALAZINE HYDROCHLORIDE 20 MG/ML
5 INJECTION INTRAMUSCULAR; INTRAVENOUS
Status: DISCONTINUED | OUTPATIENT
Start: 2022-01-19 | End: 2022-01-19 | Stop reason: HOSPADM

## 2022-01-19 RX ORDER — SODIUM CHLORIDE, SODIUM LACTATE, POTASSIUM CHLORIDE, CALCIUM CHLORIDE 600; 310; 30; 20 MG/100ML; MG/100ML; MG/100ML; MG/100ML
9 INJECTION, SOLUTION INTRAVENOUS CONTINUOUS
Status: DISCONTINUED | OUTPATIENT
Start: 2022-01-19 | End: 2022-01-19 | Stop reason: HOSPADM

## 2022-01-19 RX ORDER — DIPHENHYDRAMINE HCL 25 MG
25 CAPSULE ORAL
Status: DISCONTINUED | OUTPATIENT
Start: 2022-01-19 | End: 2022-01-19 | Stop reason: HOSPADM

## 2022-01-19 RX ORDER — DIPHENHYDRAMINE HYDROCHLORIDE 50 MG/ML
12.5 INJECTION INTRAMUSCULAR; INTRAVENOUS
Status: DISCONTINUED | OUTPATIENT
Start: 2022-01-19 | End: 2022-01-19 | Stop reason: HOSPADM

## 2022-01-19 RX ORDER — ONDANSETRON 2 MG/ML
4 INJECTION INTRAMUSCULAR; INTRAVENOUS ONCE AS NEEDED
Status: DISCONTINUED | OUTPATIENT
Start: 2022-01-19 | End: 2022-01-19 | Stop reason: HOSPADM

## 2022-01-19 RX ORDER — HEPARIN SODIUM 1000 [USP'U]/ML
INJECTION, SOLUTION INTRAVENOUS; SUBCUTANEOUS AS NEEDED
Status: DISCONTINUED | OUTPATIENT
Start: 2022-01-19 | End: 2022-01-19 | Stop reason: SURG

## 2022-01-19 RX ORDER — PROPOFOL 10 MG/ML
VIAL (ML) INTRAVENOUS CONTINUOUS PRN
Status: DISCONTINUED | OUTPATIENT
Start: 2022-01-19 | End: 2022-01-19 | Stop reason: SURG

## 2022-01-19 RX ORDER — FAMOTIDINE 10 MG/ML
20 INJECTION, SOLUTION INTRAVENOUS ONCE
Status: COMPLETED | OUTPATIENT
Start: 2022-01-19 | End: 2022-01-19

## 2022-01-19 RX ORDER — LABETALOL HYDROCHLORIDE 5 MG/ML
5 INJECTION, SOLUTION INTRAVENOUS
Status: DISCONTINUED | OUTPATIENT
Start: 2022-01-19 | End: 2022-01-19 | Stop reason: HOSPADM

## 2022-01-19 RX ORDER — NALOXONE HCL 0.4 MG/ML
0.2 VIAL (ML) INJECTION AS NEEDED
Status: DISCONTINUED | OUTPATIENT
Start: 2022-01-19 | End: 2022-01-19 | Stop reason: HOSPADM

## 2022-01-19 RX ORDER — HYDROCODONE BITARTRATE AND ACETAMINOPHEN 7.5; 325 MG/1; MG/1
1 TABLET ORAL ONCE AS NEEDED
Status: DISCONTINUED | OUTPATIENT
Start: 2022-01-19 | End: 2022-01-19 | Stop reason: HOSPADM

## 2022-01-19 RX ORDER — SODIUM CHLORIDE 0.9 % (FLUSH) 0.9 %
3-10 SYRINGE (ML) INJECTION AS NEEDED
Status: DISCONTINUED | OUTPATIENT
Start: 2022-01-19 | End: 2022-01-19 | Stop reason: HOSPADM

## 2022-01-19 RX ORDER — HYDROCODONE BITARTRATE AND ACETAMINOPHEN 5; 325 MG/1; MG/1
1 TABLET ORAL EVERY 8 HOURS PRN
Qty: 10 TABLET | Refills: 0 | Status: ON HOLD | OUTPATIENT
Start: 2022-01-19 | End: 2022-02-25

## 2022-01-19 RX ORDER — IBUPROFEN 600 MG/1
600 TABLET ORAL ONCE AS NEEDED
Status: DISCONTINUED | OUTPATIENT
Start: 2022-01-19 | End: 2022-01-19 | Stop reason: HOSPADM

## 2022-01-19 RX ORDER — LIDOCAINE HYDROCHLORIDE 10 MG/ML
0.5 INJECTION, SOLUTION EPIDURAL; INFILTRATION; INTRACAUDAL; PERINEURAL ONCE AS NEEDED
Status: DISCONTINUED | OUTPATIENT
Start: 2022-01-19 | End: 2022-01-19 | Stop reason: HOSPADM

## 2022-01-19 RX ORDER — PROPOFOL 10 MG/ML
VIAL (ML) INTRAVENOUS AS NEEDED
Status: DISCONTINUED | OUTPATIENT
Start: 2022-01-19 | End: 2022-01-19 | Stop reason: SURG

## 2022-01-19 RX ORDER — PROMETHAZINE HYDROCHLORIDE 25 MG/1
25 TABLET ORAL ONCE AS NEEDED
Status: DISCONTINUED | OUTPATIENT
Start: 2022-01-19 | End: 2022-01-19 | Stop reason: HOSPADM

## 2022-01-19 RX ORDER — LIDOCAINE HYDROCHLORIDE 20 MG/ML
INJECTION, SOLUTION INFILTRATION; PERINEURAL AS NEEDED
Status: DISCONTINUED | OUTPATIENT
Start: 2022-01-19 | End: 2022-01-19 | Stop reason: SURG

## 2022-01-19 RX ORDER — PROMETHAZINE HYDROCHLORIDE 25 MG/1
25 SUPPOSITORY RECTAL ONCE AS NEEDED
Status: DISCONTINUED | OUTPATIENT
Start: 2022-01-19 | End: 2022-01-19 | Stop reason: HOSPADM

## 2022-01-19 RX ADMIN — IOPAMIDOL 64 ML: 510 INJECTION, SOLUTION INTRAVASCULAR at 10:11

## 2022-01-19 RX ADMIN — FENTANYL CITRATE 50 MCG: 50 INJECTION INTRAMUSCULAR; INTRAVENOUS at 09:08

## 2022-01-19 RX ADMIN — PHENYLEPHRINE HYDROCHLORIDE 100 MCG: 10 INJECTION, SOLUTION INTRAVENOUS at 09:43

## 2022-01-19 RX ADMIN — SODIUM CHLORIDE, POTASSIUM CHLORIDE, SODIUM LACTATE AND CALCIUM CHLORIDE: 600; 310; 30; 20 INJECTION, SOLUTION INTRAVENOUS at 09:32

## 2022-01-19 RX ADMIN — FAMOTIDINE 20 MG: 10 INJECTION INTRAVENOUS at 08:18

## 2022-01-19 RX ADMIN — PHENYLEPHRINE HYDROCHLORIDE 100 MCG: 10 INJECTION, SOLUTION INTRAVENOUS at 10:05

## 2022-01-19 RX ADMIN — HEPARIN SODIUM 5000 UNITS: 1000 INJECTION INTRAVENOUS; SUBCUTANEOUS at 09:40

## 2022-01-19 RX ADMIN — SODIUM CHLORIDE, POTASSIUM CHLORIDE, SODIUM LACTATE AND CALCIUM CHLORIDE 9 ML/HR: 600; 310; 30; 20 INJECTION, SOLUTION INTRAVENOUS at 08:18

## 2022-01-19 RX ADMIN — PROPOFOL 100 MG: 10 INJECTION, EMULSION INTRAVENOUS at 09:22

## 2022-01-19 RX ADMIN — CEFAZOLIN SODIUM 2 G: 2 INJECTION, SOLUTION INTRAVENOUS at 09:09

## 2022-01-19 RX ADMIN — LIDOCAINE HYDROCHLORIDE 100 MG: 20 INJECTION, SOLUTION INFILTRATION; PERINEURAL at 09:22

## 2022-01-19 RX ADMIN — Medication 100 MCG/KG/MIN: at 09:22

## 2022-01-19 NOTE — DISCHARGE INSTRUCTIONS
[facility]  Mario Alberto Sanchez, Gera, Angella Yeager, Richard Collazo Vinyard  [address]  [place] [address]  [phone]      Discharge Instructions for Angiogram    1. Go home, rest and take it easy today.       2. You may experience some dizziness or memory loss from the anesthesia.  This may last for the next 24 hours.  Someone should plan on staying with you for the first 24 hours for your safety.     3. Do not make any important legal decisions or sign any legal papers for the next 24 hours.       4. Eat and drink lightly today.  Start off with liquids, jello, soup, crackers or other bland foods at first. You may advance your diet tomorrow as tolerated as long as you do not experience any nausea or vomiting.     5. You may resume routine medications including blood thinners. However, Glucophage should be not be started for 72 hours after the dye is given.       6. No lifting over 10 pounds and no strenuous activity for the next 2-3 days.     7. Try not to strain or bear down when your bowels move or when you empty your bladder.     8. Limit going up and down steps for 2 days.     9. No driving for the remainder of the day.  You may resume limited driving tomorrow if necessary.      10.  You may shower tomorrow.  No bath or hot tubs for at least 2 days after the procedure.           11. Leave the pressure dressing on until tomorrow morning.  You may then take this off, as well as the small see through dressing with gauze tomorrow.  If it doesn’t come off easily, do not pull this off.  If it is stuck, shower and let the warm water loosen it before removal.        12. Check your groin dressing regularly for bleeding through the dressing (under the pressure dressing).  A small amount of blood contained by the gauze is normal; the whole dressing should not be filled with blood or leaking out under the sides.      13. If you experience bleeding through the gauze/clear sticky  dressing, lay flat and have someone apply direct pressure for 15 minutes.  If bleeding has stopped after this, you may put a clean gauze and tape over the area.  Continue to lie flat for 1-2 hours.  If bleeding continues after 15 minutes of pressure, call us at the number listed above.  There is an MD available after hours.       14. If you experience heavy bleeding or large swelling, continue to hold firm pressure and               call 911.  Do not call the MD on call.      15.  If you experience pain or discomfort you may take Tylenol or Ibuprofen, whichever you normally use for minor discomfort, unless otherwise instructed.         16.  If the MD gives you a prescription for narcotic pain pills (Tylenol #3, Vicodin, Hydrocodone, Oxycodone or Percocet), you cannot drive a vehicle or operate machinery while taking these.     17.  Severe pain is not expected after this procedure.  If you experience severe pain, please call our office at [phone].     18.  Remember to contact our office for any of the following:    • Fever > 101 degrees  • Severe pain that cannot be controlled by taking your pain pills  • Severe nausea or vomiting   • Significant bleeding of your incisions  • Drainage that has a bad smell or is yellow or green in appearance  • Any other questions or concerns

## 2022-01-19 NOTE — PERIOPERATIVE NURSING NOTE
Dr Collazo notified pt left groin gauze saturated after sitting up to 30 degrees, rn changed dressing, assessed, no active oozing, dstat applied, 5 min pressure.  No new orders received, RN will continue to monitor.

## 2022-01-19 NOTE — PERIOPERATIVE NURSING NOTE
"Pt left bracelet in belonging bag in room, patient phoned, stated to \"throw it away\" RN will mail to pt.  Witnessed by Ayla PAUL RN  "

## 2022-01-19 NOTE — ANESTHESIA PREPROCEDURE EVALUATION
Anesthesia Evaluation     Patient summary reviewed and Nursing notes reviewed   NPO Solid Status: > 8 hours  NPO Liquid Status: > 2 hours           Airway   Mallampati: II  TM distance: >3 FB  Neck ROM: full  No difficulty expected  Dental - normal exam     Comment: No teeth on the top.    Pulmonary - normal exam   (+) a smoker Former,   Cardiovascular - normal exam  Exercise tolerance: good (4-7 METS)    ECG reviewed  PT is on anticoagulation therapy    (+) hypertension, past MI  >12 months, CAD, cardiac stents unknown timeframe PVD, hyperlipidemia,       Neuro/Psych  (+) numbness,     GI/Hepatic/Renal/Endo    (+)  GERD,  diabetes mellitus type 2 well controlled,     Musculoskeletal     Abdominal  - normal exam    Bowel sounds: normal.   Substance History      OB/GYN          Other   arthritis,                      Anesthesia Plan    ASA 3     MAC       Anesthetic plan, all risks, benefits, and alternatives have been provided, discussed and informed consent has been obtained with: patient.    Plan discussed with CRNA and attending.        CODE STATUS:

## 2022-01-19 NOTE — OP NOTE
Date of Admission:  1/19/2022 01/19/22  Dharmesh Collazo MD  [facility]    Preoperative Diagnosis:   Peripheral arterial disease with rest pain, minor tissue loss and Occluded femoral to popliteal bypass.    Postoperative Diagnosis:   Same    Procedure Performed:   Ultrasound-guided left common femoral artery percutaneous access.  Right lower extremity runoff arteriogram (inadequate opacification of tibial vessels prior CT angiogram.  Right external iliac stent placement with 10 x 8 self-expanding stent.    CPT:  38405 Iliac stent initial  49611-26 Unilateral extremity runoff arteriogram (no diagnostic quality arterial imaging prior or significant change in vascular status)  05624 Ultrasound-guided percutaneous vascular access.    Surgeon:   Dharmesh Collazo MD    Assistant:    Bri GAVIRIA    Anesthesia:   MAC with local    Estimated Blood Loss:   Minimal    Findings:    Successful ultrasound-guided left common femoral artery percutaneous access.    Right lower extremity runoff arteriogram shows widely patent common and deep femoral artery.  The SFA and popliteal artery are occluded with reconstitution down in the below-knee segment.  There is a known left femoral to below-knee popliteal artery bypass that is occluded as well.  There is proximal disease of the tibial vessels with healthy healthy is runoff through the posterior tibial artery starting in the mid calf.    Implants:    Implant Name Type Inv. Item Serial No.  Lot No. LRB No. Used Action   STNT PROTEGE GPS SLF/EXP .035 [id number] - [id number] Stent STNT PROTEGE GPS SLF/EXP .035 [id number]  [id number]  A teextee [id number] Right 1 Implanted       Specimen:   none    Complications:   none    Access:  6 [place] destination left common femoral artery.    Closure:  Perclose closure device    Dispo:  To PACU    Indication for procedure:   76 y.o. male with ischemic rest pain of the right foot.  He is an active smoker.  He has a right leg bypass  graft that is occluded likely since November.  I performed 1 percutaneous intervention to maintain patency of the bypass graft and 1 open revision.  He has been having symptoms of ischemia since November.  Now he has a small plantar ulcer overlying his second or third metatarsal surface.  I discussed with him the risk benefits alternatives undergoing diagnostic arteriogram with iliac revascularization and distal arteriogram to assist in planning a distal bypass.  Informed consent obtained.    Description of procedure:   The patient was taken to the operating room. Anesthesia was administered through IV in a monitored setting. The surgical site was prepped and draped in the usual sterile manner. A full surgical timeout was performed. Ultrasound guided left common femoral artery percutaneous access was performed with a micro needle. A micro wire was placed without difficulty. Placement was confirmed with fluoroscopy. Ultimately up-sizing to a [place], oblique angiogram showed good common femoral access for closure device at case completion. A 6-[place] sheath was placed over the wire. Distal aortogram and right iliac arteriogram were performed. Up-and-over the wire catheterization was performed. Interestingly, the right external iliac stenosis did not look near as bad on arteriogram as it did on CT angiogram. Nevertheless, I did a CT angiogram as well and showed some moderate to severe focal stenosis. I elected to place a 10 x 8 self expanding stent with post dilatation with a 9 mm [place] balloon. This essentially covered the entirety of the external iliac. Infrarenal runoff was performed with the above result. At this point, all wires, catheters, and sheaths were removed. ProGlide was used for good closure. Another 5,000 units of heparin was given. No protamine was administered. The patient tolerated the procedure well.     Dharmesh Collazo MD  01/19/22    There are no hospital problems to display for this  patient.

## 2022-01-19 NOTE — ANESTHESIA POSTPROCEDURE EVALUATION
Patient: Niraj Hassan    Procedure Summary     Date: 01/19/22 [facility] / [facility]    Anesthesia Start: 0917 Anesthesia Stop: 1022    Procedure: RIGHT ILIAC STENET PLACEMENT (Right Thigh) Diagnosis:     Surgeons: Dharmesh Collazo MD Provider: Richard Cloud MD    Anesthesia Type: MAC ASA Status: 3          Anesthesia Type: MAC    Vitals  Vitals Value Taken Time   /83 01/19/22 1041   Temp     Pulse 92 01/19/22 1050   Resp 16 01/19/22 1019   SpO2 97 % 01/19/22 1050   Vitals shown include unvalidated device data.        Post Anesthesia Care and Evaluation    Patient location during evaluation: bedside  Patient participation: complete - patient participated  Level of consciousness: awake  Pain management: adequate  Airway patency: patent  Anesthetic complications: No anesthetic complications    Cardiovascular status: acceptable  Respiratory status: acceptable  Hydration status: acceptable    Comments: /70 (BP Location: Right arm, Patient Position: Lying)   Pulse 90   Temp 36.9 °C (98.4 °F) (Oral)   Resp 16   SpO2 100%

## 2022-01-24 RX ORDER — GLIPIZIDE 5 MG/1
5 TABLET ORAL
Qty: 90 TABLET | Refills: 1 | Status: SHIPPED | OUTPATIENT
Start: 2022-01-24 | End: 2022-01-24 | Stop reason: SDUPTHER

## 2022-01-24 RX ORDER — GLIPIZIDE 5 MG/1
5 TABLET ORAL
Qty: 90 TABLET | Refills: 1 | Status: SHIPPED | OUTPATIENT
Start: 2022-01-24 | End: 2022-06-17

## 2022-02-15 ENCOUNTER — TELEPHONE (OUTPATIENT)
Dept: CARDIOLOGY | Facility: CLINIC | Age: 77
End: 2022-02-15

## 2022-02-15 DIAGNOSIS — I25.5 ISCHEMIC CARDIOMYOPATHY: Primary | ICD-10-CM

## 2022-02-15 NOTE — TELEPHONE ENCOUNTER
I received a call from Dr. Collazo's office today requesting clearance for the pt's upcomming fem pop bypass.   I assume Dr. Collazo contacted you as well since yo put in an order for him to have an echo. And that he needs that echo prior to his procedure.  Please advise.    Thanks,    Liya

## 2022-02-15 NOTE — TELEPHONE ENCOUNTER
Last cath was done at [facility] on 10/9/2009. I don't think we will be able to get the images, but I can get you the report.    Liya

## 2022-02-17 NOTE — TELEPHONE ENCOUNTER
He does not need additional ischemic work-up prior to clearance.  I do want the echo.  See if we can get that.

## 2022-02-17 NOTE — TELEPHONE ENCOUNTER
He is scheduled for tomorrow @ 830.  If there is any problem with the precert, someone will reach out to him.

## 2022-02-18 ENCOUNTER — HOSPITAL ENCOUNTER (OUTPATIENT)
Dept: CARDIOLOGY | Facility: HOSPITAL | Age: 77
Discharge: HOME | End: 2022-02-18
Admitting: INTERNAL MEDICINE

## 2022-02-18 VITALS
DIASTOLIC BLOOD PRESSURE: 80 MMHG | HEART RATE: 93 BPM | WEIGHT: 149 LBS | BODY MASS INDEX: 22.58 KG/M2 | OXYGEN SATURATION: 93 % | SYSTOLIC BLOOD PRESSURE: 160 MMHG | HEIGHT: 68 IN

## 2022-02-18 DIAGNOSIS — I25.5 ISCHEMIC CARDIOMYOPATHY: ICD-10-CM

## 2022-02-18 PROCEDURE — 93306 TTE W/DOPPLER COMPLETE: CPT | Performed by: INTERNAL MEDICINE

## 2022-02-18 PROCEDURE — 25010000002 PERFLUTREN (DEFINITY) 8.476 MG IN SODIUM CHLORIDE (PF) 0.9 % 10 ML INJECTION: Performed by: INTERNAL MEDICINE

## 2022-02-18 PROCEDURE — 93356 MYOCRD STRAIN IMG SPCKL TRCK: CPT

## 2022-02-18 PROCEDURE — 93356 MYOCRD STRAIN IMG SPCKL TRCK: CPT | Performed by: INTERNAL MEDICINE

## 2022-02-18 PROCEDURE — 93306 TTE W/DOPPLER COMPLETE: CPT

## 2022-02-18 RX ADMIN — PERFLUTREN 1.5 ML: 6.52 INJECTION, SUSPENSION INTRAVENOUS at 09:07

## 2022-02-21 LAB
AORTIC ARCH: 2.3 CM
ASCENDING AORTA: 3 CM
BH CV ECHO LEFT VENTRICLE GLOBAL LONGITUDINAL STRAIN: -15.5 %
BH CV ECHO MEAS - ACS: 1.8 CM
BH CV ECHO MEAS - AO ARCH DIAM (PROXIMAL TRANS.): 2.3 CM
BH CV ECHO MEAS - AO MAX PG (FULL): 1.8 MMHG
BH CV ECHO MEAS - AO MAX PG: 4.7 MMHG
BH CV ECHO MEAS - AO MEAN PG (FULL): 1.1 MMHG
BH CV ECHO MEAS - AO MEAN PG: 3 MMHG
BH CV ECHO MEAS - AO ROOT AREA (BSA CORRECTED): 1.8
BH CV ECHO MEAS - AO ROOT AREA: 8.4 CM^2
BH CV ECHO MEAS - AO ROOT DIAM: 3.3 CM
BH CV ECHO MEAS - AO V2 MAX: 108.6 CM/SEC
BH CV ECHO MEAS - AO V2 MEAN: 82.9 CM/SEC
BH CV ECHO MEAS - AO V2 VTI: 23.4 CM
BH CV ECHO MEAS - ASC AORTA: 3 CM
BH CV ECHO MEAS - AVA(I,A): 2.4 CM^2
BH CV ECHO MEAS - AVA(I,D): 2.4 CM^2
BH CV ECHO MEAS - AVA(V,A): 2.4 CM^2
BH CV ECHO MEAS - AVA(V,D): 2.4 CM^2
BH CV ECHO MEAS - BSA(HAYCOCK): 1.8 M^2
BH CV ECHO MEAS - BSA: 1.8 M^2
BH CV ECHO MEAS - BZI_BMI: 22.7 KILOGRAMS/M^2
BH CV ECHO MEAS - BZI_METRIC_HEIGHT: 172.7 CM
BH CV ECHO MEAS - BZI_METRIC_WEIGHT: 67.6 KG
BH CV ECHO MEAS - EDV(CUBED): 67.9 ML
BH CV ECHO MEAS - EDV(MOD-SP2): 126 ML
BH CV ECHO MEAS - EDV(MOD-SP4): 124 ML
BH CV ECHO MEAS - EDV(TEICH): 73.3 ML
BH CV ECHO MEAS - EF(CUBED): 65.1 %
BH CV ECHO MEAS - EF(MOD-BP): 47.3 %
BH CV ECHO MEAS - EF(MOD-SP2): 48.4 %
BH CV ECHO MEAS - EF(MOD-SP4): 48.4 %
BH CV ECHO MEAS - EF(TEICH): 57.1 %
BH CV ECHO MEAS - EF_3D-VOL: 44 %
BH CV ECHO MEAS - ESV(CUBED): 23.7 ML
BH CV ECHO MEAS - ESV(MOD-SP2): 65 ML
BH CV ECHO MEAS - ESV(MOD-SP4): 64 ML
BH CV ECHO MEAS - ESV(TEICH): 31.5 ML
BH CV ECHO MEAS - FS: 29.6 %
BH CV ECHO MEAS - IVS/LVPW: 0.97
BH CV ECHO MEAS - IVSD: 1.3 CM
BH CV ECHO MEAS - LAT PEAK E' VEL: 7.7 CM/SEC
BH CV ECHO MEAS - LV DIASTOLIC VOL/BSA (35-75): 68.8 ML/M^2
BH CV ECHO MEAS - LV MASS(C)D: 187.4 GRAMS
BH CV ECHO MEAS - LV MASS(C)DI: 103.9 GRAMS/M^2
BH CV ECHO MEAS - LV MAX PG: 2.9 MMHG
BH CV ECHO MEAS - LV MEAN PG: 1.9 MMHG
BH CV ECHO MEAS - LV SYSTOLIC VOL/BSA (12-30): 35.5 ML/M^2
BH CV ECHO MEAS - LV V1 MAX: 84.8 CM/SEC
BH CV ECHO MEAS - LV V1 MEAN: 66.7 CM/SEC
BH CV ECHO MEAS - LV V1 VTI: 18.3 CM
BH CV ECHO MEAS - LVIDD: 4.1 CM
BH CV ECHO MEAS - LVIDS: 2.9 CM
BH CV ECHO MEAS - LVLD AP2: 9.1 CM
BH CV ECHO MEAS - LVLD AP4: 8 CM
BH CV ECHO MEAS - LVLS AP2: 8 CM
BH CV ECHO MEAS - LVLS AP4: 6.6 CM
BH CV ECHO MEAS - LVOT AREA (M): 3.1 CM^2
BH CV ECHO MEAS - LVOT AREA: 3.1 CM^2
BH CV ECHO MEAS - LVOT DIAM: 2 CM
BH CV ECHO MEAS - LVPWD: 1.3 CM
BH CV ECHO MEAS - MED PEAK E' VEL: 6.1 CM/SEC
BH CV ECHO MEAS - MV A DUR: 0.09 SEC
BH CV ECHO MEAS - MV A MAX VEL: 139 CM/SEC
BH CV ECHO MEAS - MV DEC SLOPE: 1759 CM/SEC^2
BH CV ECHO MEAS - MV DEC TIME: 0.1 SEC
BH CV ECHO MEAS - MV E MAX VEL: 92.4 CM/SEC
BH CV ECHO MEAS - MV E/A: 0.66
BH CV ECHO MEAS - MV MAX PG: 8.8 MMHG
BH CV ECHO MEAS - MV MEAN PG: 4.2 MMHG
BH CV ECHO MEAS - MV P1/2T MAX VEL: 111.5 CM/SEC
BH CV ECHO MEAS - MV P1/2T: 18.6 MSEC
BH CV ECHO MEAS - MV V2 MAX: 148.3 CM/SEC
BH CV ECHO MEAS - MV V2 MEAN: 95.1 CM/SEC
BH CV ECHO MEAS - MV V2 VTI: 22.1 CM
BH CV ECHO MEAS - MVA P1/2T LCG: 2 CM^2
BH CV ECHO MEAS - MVA(P1/2T): 11.9 CM^2
BH CV ECHO MEAS - MVA(VTI): 2.6 CM^2
BH CV ECHO MEAS - PA ACC TIME: 0.08 SEC
BH CV ECHO MEAS - PA MAX PG (FULL): 0.3 MMHG
BH CV ECHO MEAS - PA MAX PG: 2.1 MMHG
BH CV ECHO MEAS - PA PR(ACCEL): 41 MMHG
BH CV ECHO MEAS - PA V2 MAX: 72.7 CM/SEC
BH CV ECHO MEAS - PULM A REVS DUR: 0.1 SEC
BH CV ECHO MEAS - PULM A REVS VEL: 31.9 CM/SEC
BH CV ECHO MEAS - PULM DIAS VEL: 40 CM/SEC
BH CV ECHO MEAS - PULM S/D: 1.4
BH CV ECHO MEAS - PULM SYS VEL: 54.3 CM/SEC
BH CV ECHO MEAS - PVA(V,A): 3.6 CM^2
BH CV ECHO MEAS - PVA(V,D): 3.6 CM^2
BH CV ECHO MEAS - QP/QS: 0.76
BH CV ECHO MEAS - RAP SYSTOLE: 3 MMHG
BH CV ECHO MEAS - RV MAX PG: 1.8 MMHG
BH CV ECHO MEAS - RV MEAN PG: 1 MMHG
BH CV ECHO MEAS - RV V1 MAX: 67.3 CM/SEC
BH CV ECHO MEAS - RV V1 MEAN: 47.6 CM/SEC
BH CV ECHO MEAS - RV V1 VTI: 11.3 CM
BH CV ECHO MEAS - RVOT AREA: 3.9 CM^2
BH CV ECHO MEAS - RVOT DIAM: 2.2 CM
BH CV ECHO MEAS - RVSP: 10.2 MMHG
BH CV ECHO MEAS - SI(AO): 109.3 ML/M^2
BH CV ECHO MEAS - SI(CUBED): 24.5 ML/M^2
BH CV ECHO MEAS - SI(LVOT): 31.8 ML/M^2
BH CV ECHO MEAS - SI(MOD-SP2): 33.8 ML/M^2
BH CV ECHO MEAS - SI(MOD-SP4): 33.3 ML/M^2
BH CV ECHO MEAS - SI(TEICH): 23.2 ML/M^2
BH CV ECHO MEAS - SUP REN AO DIAM: 1.7 CM
BH CV ECHO MEAS - SV(AO): 197.1 ML
BH CV ECHO MEAS - SV(CUBED): 44.2 ML
BH CV ECHO MEAS - SV(LVOT): 57.4 ML
BH CV ECHO MEAS - SV(MOD-SP2): 61 ML
BH CV ECHO MEAS - SV(MOD-SP4): 60 ML
BH CV ECHO MEAS - SV(RVOT): 43.8 ML
BH CV ECHO MEAS - SV(TEICH): 41.9 ML
BH CV ECHO MEAS - TAPSE (>1.6): 2.4 CM
BH CV ECHO MEAS - TR MAX VEL: 134.2 CM/SEC
BH CV ECHO MEASUREMENTS AVERAGE E/E' RATIO: 13.39
BH CV VAS BP RIGHT ARM: NORMAL MMHG
BH CV XLRA - RV BASE: 2.8 CM
BH CV XLRA - RV LENGTH: 6.8 CM
BH CV XLRA - RV MID: 2.4 CM
BH CV XLRA - TDI S': 11.9 CM/SEC
LEFT ATRIUM VOLUME INDEX: 26 ML/M2
MAXIMAL PREDICTED HEART RATE: 145 BPM
SINUS: 3.1 CM
STJ: 3.1 CM
STRESS TARGET HR: 123 BPM

## 2022-02-23 ENCOUNTER — PRE-ADMISSION TESTING (OUTPATIENT)
Dept: PREADMISSION TESTING | Facility: HOSPITAL | Age: 77
End: 2022-02-23

## 2022-02-23 VITALS
HEIGHT: 68 IN | DIASTOLIC BLOOD PRESSURE: 96 MMHG | WEIGHT: 152 LBS | HEART RATE: 90 BPM | BODY MASS INDEX: 23.04 KG/M2 | RESPIRATION RATE: 18 BRPM | TEMPERATURE: 97.9 F | OXYGEN SATURATION: 97 % | SYSTOLIC BLOOD PRESSURE: 164 MMHG

## 2022-02-23 LAB
ANION GAP SERPL CALCULATED.3IONS-SCNC: 14 MMOL/L (ref 5–15)
BUN SERPL-MCNC: 13 MG/DL (ref 8–23)
BUN/CREAT SERPL: 15.5 (ref 7–25)
CALCIUM SPEC-SCNC: 9.6 MG/DL (ref 8.6–10.5)
CHLORIDE SERPL-SCNC: 102 MMOL/L (ref 98–107)
CO2 SERPL-SCNC: 23 MMOL/L (ref 22–29)
CREAT SERPL-MCNC: 0.84 MG/DL (ref 0.76–1.27)
DEPRECATED RDW RBC AUTO: 41.4 FL (ref 37–54)
ERYTHROCYTE [DISTWIDTH] IN BLOOD BY AUTOMATED COUNT: 11.9 % (ref 12.3–15.4)
GFR SERPL CREATININE-BSD FRML MDRD: 89 ML/MIN/1.73
GLUCOSE SERPL-MCNC: 197 MG/DL (ref 65–99)
HCT VFR BLD AUTO: 38.7 % (ref 37.5–51)
HGB BLD-MCNC: 13 G/DL (ref 13–17.7)
MCH RBC QN AUTO: 32 PG (ref 26.6–33)
MCHC RBC AUTO-ENTMCNC: 33.6 G/DL (ref 31.5–35.7)
MCV RBC AUTO: 95.3 FL (ref 79–97)
PLATELET # BLD AUTO: 289 10*3/MM3 (ref 140–450)
PMV BLD AUTO: 9.8 FL (ref 6–12)
POTASSIUM SERPL-SCNC: 4.4 MMOL/L (ref 3.5–5.2)
RBC # BLD AUTO: 4.06 10*6/MM3 (ref 4.14–5.8)
SARS-COV-2 ORF1AB RESP QL NAA+PROBE: NOT DETECTED
SODIUM SERPL-SCNC: 139 MMOL/L (ref 136–145)
WBC NRBC COR # BLD: 9.13 10*3/MM3 (ref 3.4–10.8)

## 2022-02-23 PROCEDURE — 36415 COLL VENOUS BLD VENIPUNCTURE: CPT

## 2022-02-23 PROCEDURE — 80048 BASIC METABOLIC PNL TOTAL CA: CPT

## 2022-02-23 PROCEDURE — U0004 COV-19 TEST NON-CDC HGH THRU: HCPCS

## 2022-02-23 PROCEDURE — C9803 HOPD COVID-19 SPEC COLLECT: HCPCS

## 2022-02-23 PROCEDURE — 85027 COMPLETE CBC AUTOMATED: CPT

## 2022-02-23 RX ORDER — CHLORHEXIDINE GLUCONATE 500 MG/1
CLOTH TOPICAL TAKE AS DIRECTED
Status: ON HOLD | COMMUNITY
End: 2022-02-25

## 2022-02-24 RX ORDER — METOPROLOL SUCCINATE 50 MG/1
50 TABLET, EXTENDED RELEASE ORAL DAILY
Qty: 90 TABLET | Refills: 0 | Status: SHIPPED | OUTPATIENT
Start: 2022-02-24 | End: 2022-05-10

## 2022-02-24 NOTE — TELEPHONE ENCOUNTER
Rx Refill Note  Requested Prescriptions     Pending Prescriptions Disp Refills   • metoprolol succinate XL (TOPROL-XL) 50 MG 24 hr tablet [Pharmacy Med Name: METOPROLOL SUCCINATE ER 50 MG Tablet Extended Release 24 Hour] 90 tablet 1     Sig: TAKE 1 TABLET EVERY DAY      Last office visit with prescribing clinician: 10/6/2021      Next office visit with prescribing clinician: Visit date not found            Marie Pena MA  02/24/22, 16:11 EST

## 2022-02-25 ENCOUNTER — ANESTHESIA (OUTPATIENT)
Dept: PERIOP | Facility: HOSPITAL | Age: 77
End: 2022-02-25

## 2022-02-25 ENCOUNTER — ANESTHESIA EVENT (OUTPATIENT)
Dept: PERIOP | Facility: HOSPITAL | Age: 77
End: 2022-02-25

## 2022-02-25 ENCOUNTER — HOSPITAL ENCOUNTER (INPATIENT)
Facility: HOSPITAL | Age: 77
LOS: 3 days | Discharge: HOME | End: 2022-02-28
Attending: SURGERY | Admitting: SURGERY

## 2022-02-25 DIAGNOSIS — I73.9 PVD (PERIPHERAL VASCULAR DISEASE): ICD-10-CM

## 2022-02-25 DIAGNOSIS — I73.9 PAD (PERIPHERAL ARTERY DISEASE): Primary | ICD-10-CM

## 2022-02-25 LAB
ABO GROUP BLD: NORMAL
BLD GP AB SCN SERPL QL: NEGATIVE
GLUCOSE BLDC GLUCOMTR-MCNC: 200 MG/DL (ref 70–130)
GLUCOSE BLDC GLUCOMTR-MCNC: 214 MG/DL (ref 70–130)
GLUCOSE BLDC GLUCOMTR-MCNC: 219 MG/DL (ref 70–130)
GLUCOSE BLDC GLUCOMTR-MCNC: 295 MG/DL (ref 70–130)
RH BLD: POSITIVE
T&S EXPIRATION DATE: NORMAL

## 2022-02-25 PROCEDURE — 82962 GLUCOSE BLOOD TEST: CPT

## 2022-02-25 PROCEDURE — 0 CEFAZOLIN PER 500 MG: Performed by: SURGERY

## 2022-02-25 PROCEDURE — 25010000002 FENTANYL CITRATE (PF) 50 MCG/ML SOLUTION: Performed by: STUDENT IN AN ORGANIZED HEALTH CARE EDUCATION/TRAINING PROGRAM

## 2022-02-25 PROCEDURE — 25010000002 VANCOMYCIN 1 G RECONSTITUTED SOLUTION

## 2022-02-25 PROCEDURE — 04CT0ZZ EXTIRPATION OF MATTER FROM RIGHT PERONEAL ARTERY, OPEN APPROACH: ICD-10-PCS | Performed by: SURGERY

## 2022-02-25 PROCEDURE — 0 CEFAZOLIN IN DEXTROSE 2-4 GM/100ML-% SOLUTION: Performed by: SURGERY

## 2022-02-25 PROCEDURE — 25010000002 PROTAMINE SULFATE PER 10 MG: Performed by: ANESTHESIOLOGY

## 2022-02-25 PROCEDURE — C1889 IMPLANT/INSERT DEVICE, NOC: HCPCS | Performed by: SURGERY

## 2022-02-25 PROCEDURE — 04CM0ZZ EXTIRPATION OF MATTER FROM RIGHT POPLITEAL ARTERY, OPEN APPROACH: ICD-10-PCS | Performed by: SURGERY

## 2022-02-25 PROCEDURE — 25010000002 PROPOFOL 10 MG/ML EMULSION: Performed by: STUDENT IN AN ORGANIZED HEALTH CARE EDUCATION/TRAINING PROGRAM

## 2022-02-25 PROCEDURE — 25010000002 MIDAZOLAM PER 1 MG: Performed by: ANESTHESIOLOGY

## 2022-02-25 PROCEDURE — C1768 GRAFT, VASCULAR: HCPCS | Performed by: SURGERY

## 2022-02-25 PROCEDURE — 25010000002 PHENYLEPHRINE 10 MG/ML SOLUTION: Performed by: ANESTHESIOLOGY

## 2022-02-25 PROCEDURE — 25010000002 HEPARIN (PORCINE) PER 1000 UNITS: Performed by: SURGERY

## 2022-02-25 PROCEDURE — 25010000002 ONDANSETRON PER 1 MG: Performed by: ANESTHESIOLOGY

## 2022-02-25 PROCEDURE — 86900 BLOOD TYPING SEROLOGIC ABO: CPT | Performed by: SURGERY

## 2022-02-25 PROCEDURE — 86850 RBC ANTIBODY SCREEN: CPT | Performed by: SURGERY

## 2022-02-25 PROCEDURE — 041K0JL BYPASS RIGHT FEMORAL ARTERY TO POPLITEAL ARTERY WITH SYNTHETIC SUBSTITUTE, OPEN APPROACH: ICD-10-PCS | Performed by: SURGERY

## 2022-02-25 PROCEDURE — 86901 BLOOD TYPING SEROLOGIC RH(D): CPT | Performed by: SURGERY

## 2022-02-25 PROCEDURE — 25010000002 HEPARIN (PORCINE) PER 1000 UNITS: Performed by: STUDENT IN AN ORGANIZED HEALTH CARE EDUCATION/TRAINING PROGRAM

## 2022-02-25 PROCEDURE — 04CP0ZZ EXTIRPATION OF MATTER FROM RIGHT ANTERIOR TIBIAL ARTERY, OPEN APPROACH: ICD-10-PCS | Performed by: SURGERY

## 2022-02-25 PROCEDURE — 04UY0JZ SUPPLEMENT LOWER ARTERY WITH SYNTHETIC SUBSTITUTE, OPEN APPROACH: ICD-10-PCS | Performed by: SURGERY

## 2022-02-25 PROCEDURE — 85347 COAGULATION TIME ACTIVATED: CPT

## 2022-02-25 PROCEDURE — 25010000002 NEOSTIGMINE 5 MG/10ML SOLUTION: Performed by: ANESTHESIOLOGY

## 2022-02-25 DEVICE — LIGACLIP MCA MULTIPLE CLIP APPLIERS, 20 MEDIUM CLIPS
Type: IMPLANTABLE DEVICE | Site: LEG | Status: FUNCTIONAL
Brand: LIGACLIP

## 2022-02-25 DEVICE — ABSORBABLE HEMOSTAT (OXIDIZED REGENERATED CELLULOSE)
Type: IMPLANTABLE DEVICE | Site: LEG | Status: FUNCTIONAL
Brand: SURGICEL NU-KNIT

## 2022-02-25 DEVICE — GRFT VASC PROPAT THNSTRCH REMVRNG6X70X60: Type: IMPLANTABLE DEVICE | Site: LEG | Status: FUNCTIONAL

## 2022-02-25 RX ORDER — SODIUM CHLORIDE, SODIUM LACTATE, POTASSIUM CHLORIDE, CALCIUM CHLORIDE 600; 310; 30; 20 MG/100ML; MG/100ML; MG/100ML; MG/100ML
50 INJECTION, SOLUTION INTRAVENOUS CONTINUOUS
Status: DISCONTINUED | OUTPATIENT
Start: 2022-02-25 | End: 2022-02-28 | Stop reason: HOSPADM

## 2022-02-25 RX ORDER — HYDROMORPHONE HYDROCHLORIDE 1 MG/ML
0.5 INJECTION, SOLUTION INTRAMUSCULAR; INTRAVENOUS; SUBCUTANEOUS
Status: DISCONTINUED | OUTPATIENT
Start: 2022-02-25 | End: 2022-02-25 | Stop reason: HOSPADM

## 2022-02-25 RX ORDER — ONDANSETRON 2 MG/ML
4 INJECTION INTRAMUSCULAR; INTRAVENOUS EVERY 6 HOURS PRN
Status: DISCONTINUED | OUTPATIENT
Start: 2022-02-25 | End: 2022-02-28 | Stop reason: HOSPADM

## 2022-02-25 RX ORDER — INSULIN LISPRO 100 [IU]/ML
0-9 INJECTION, SOLUTION INTRAVENOUS; SUBCUTANEOUS
Status: DISCONTINUED | OUTPATIENT
Start: 2022-02-25 | End: 2022-02-28 | Stop reason: HOSPADM

## 2022-02-25 RX ORDER — FENTANYL CITRATE 50 UG/ML
50 INJECTION, SOLUTION INTRAMUSCULAR; INTRAVENOUS
Status: DISCONTINUED | OUTPATIENT
Start: 2022-02-25 | End: 2022-02-25 | Stop reason: HOSPADM

## 2022-02-25 RX ORDER — DIPHENHYDRAMINE HCL 25 MG
25 CAPSULE ORAL
Status: DISCONTINUED | OUTPATIENT
Start: 2022-02-25 | End: 2022-02-25 | Stop reason: HOSPADM

## 2022-02-25 RX ORDER — CEFAZOLIN SODIUM 2 G/100ML
2 INJECTION, SOLUTION INTRAVENOUS EVERY 8 HOURS
Status: COMPLETED | OUTPATIENT
Start: 2022-02-25 | End: 2022-02-26

## 2022-02-25 RX ORDER — DEXTROSE MONOHYDRATE 25 G/50ML
25 INJECTION, SOLUTION INTRAVENOUS
Status: DISCONTINUED | OUTPATIENT
Start: 2022-02-25 | End: 2022-02-28 | Stop reason: HOSPADM

## 2022-02-25 RX ORDER — NALOXONE HCL 0.4 MG/ML
0.4 VIAL (ML) INJECTION
Status: DISCONTINUED | OUTPATIENT
Start: 2022-02-25 | End: 2022-02-28 | Stop reason: HOSPADM

## 2022-02-25 RX ORDER — ONDANSETRON 4 MG/1
4 TABLET, FILM COATED ORAL EVERY 6 HOURS PRN
Status: DISCONTINUED | OUTPATIENT
Start: 2022-02-25 | End: 2022-02-28 | Stop reason: HOSPADM

## 2022-02-25 RX ORDER — IBUPROFEN 600 MG/1
600 TABLET ORAL ONCE AS NEEDED
Status: DISCONTINUED | OUTPATIENT
Start: 2022-02-25 | End: 2022-02-25 | Stop reason: HOSPADM

## 2022-02-25 RX ORDER — HYDROCODONE BITARTRATE AND ACETAMINOPHEN 7.5; 325 MG/1; MG/1
1 TABLET ORAL ONCE AS NEEDED
Status: DISCONTINUED | OUTPATIENT
Start: 2022-02-25 | End: 2022-02-25 | Stop reason: HOSPADM

## 2022-02-25 RX ORDER — HYDRALAZINE HYDROCHLORIDE 20 MG/ML
5 INJECTION INTRAMUSCULAR; INTRAVENOUS
Status: DISCONTINUED | OUTPATIENT
Start: 2022-02-25 | End: 2022-02-25 | Stop reason: HOSPADM

## 2022-02-25 RX ORDER — ONDANSETRON 2 MG/ML
INJECTION INTRAMUSCULAR; INTRAVENOUS AS NEEDED
Status: DISCONTINUED | OUTPATIENT
Start: 2022-02-25 | End: 2022-02-25 | Stop reason: SURG

## 2022-02-25 RX ORDER — LIDOCAINE HYDROCHLORIDE 20 MG/ML
INJECTION, SOLUTION INFILTRATION; PERINEURAL AS NEEDED
Status: DISCONTINUED | OUTPATIENT
Start: 2022-02-25 | End: 2022-02-25 | Stop reason: SURG

## 2022-02-25 RX ORDER — PANTOPRAZOLE SODIUM 40 MG/1
40 TABLET, DELAYED RELEASE ORAL EVERY MORNING
Status: DISCONTINUED | OUTPATIENT
Start: 2022-02-26 | End: 2022-02-28 | Stop reason: HOSPADM

## 2022-02-25 RX ORDER — FAMOTIDINE 10 MG/ML
20 INJECTION, SOLUTION INTRAVENOUS ONCE
Status: COMPLETED | OUTPATIENT
Start: 2022-02-25 | End: 2022-02-25

## 2022-02-25 RX ORDER — HYDROCODONE BITARTRATE AND ACETAMINOPHEN 5; 325 MG/1; MG/1
1 TABLET ORAL EVERY 4 HOURS PRN
Status: DISCONTINUED | OUTPATIENT
Start: 2022-02-25 | End: 2022-02-28 | Stop reason: HOSPADM

## 2022-02-25 RX ORDER — HEPARIN SODIUM 1000 [USP'U]/ML
INJECTION, SOLUTION INTRAVENOUS; SUBCUTANEOUS AS NEEDED
Status: DISCONTINUED | OUTPATIENT
Start: 2022-02-25 | End: 2022-02-25 | Stop reason: SURG

## 2022-02-25 RX ORDER — NICOTINE POLACRILEX 4 MG
15 LOZENGE BUCCAL
Status: DISCONTINUED | OUTPATIENT
Start: 2022-02-25 | End: 2022-02-28 | Stop reason: HOSPADM

## 2022-02-25 RX ORDER — ATORVASTATIN CALCIUM 20 MG/1
40 TABLET, FILM COATED ORAL DAILY
Status: DISCONTINUED | OUTPATIENT
Start: 2022-02-25 | End: 2022-02-28 | Stop reason: HOSPADM

## 2022-02-25 RX ORDER — EPHEDRINE SULFATE 50 MG/ML
INJECTION, SOLUTION INTRAVENOUS AS NEEDED
Status: DISCONTINUED | OUTPATIENT
Start: 2022-02-25 | End: 2022-02-25 | Stop reason: SURG

## 2022-02-25 RX ORDER — PROPOFOL 10 MG/ML
VIAL (ML) INTRAVENOUS AS NEEDED
Status: DISCONTINUED | OUTPATIENT
Start: 2022-02-25 | End: 2022-02-25 | Stop reason: SURG

## 2022-02-25 RX ORDER — MORPHINE SULFATE 2 MG/ML
2 INJECTION, SOLUTION INTRAMUSCULAR; INTRAVENOUS EVERY 4 HOURS PRN
Status: DISCONTINUED | OUTPATIENT
Start: 2022-02-25 | End: 2022-02-28 | Stop reason: HOSPADM

## 2022-02-25 RX ORDER — ONDANSETRON 2 MG/ML
4 INJECTION INTRAMUSCULAR; INTRAVENOUS ONCE AS NEEDED
Status: DISCONTINUED | OUTPATIENT
Start: 2022-02-25 | End: 2022-02-25 | Stop reason: HOSPADM

## 2022-02-25 RX ORDER — OXYCODONE AND ACETAMINOPHEN 7.5; 325 MG/1; MG/1
1 TABLET ORAL EVERY 4 HOURS PRN
Status: DISCONTINUED | OUTPATIENT
Start: 2022-02-25 | End: 2022-02-25 | Stop reason: HOSPADM

## 2022-02-25 RX ORDER — CEFAZOLIN SODIUM 2 G/100ML
2 INJECTION, SOLUTION INTRAVENOUS ONCE
Status: COMPLETED | OUTPATIENT
Start: 2022-02-25 | End: 2022-02-25

## 2022-02-25 RX ORDER — MIDAZOLAM HYDROCHLORIDE 1 MG/ML
INJECTION INTRAMUSCULAR; INTRAVENOUS
Status: COMPLETED | OUTPATIENT
Start: 2022-02-25 | End: 2022-02-25

## 2022-02-25 RX ORDER — SODIUM CHLORIDE 0.9 % (FLUSH) 0.9 %
3 SYRINGE (ML) INJECTION EVERY 12 HOURS SCHEDULED
Status: DISCONTINUED | OUTPATIENT
Start: 2022-02-25 | End: 2022-02-25 | Stop reason: HOSPADM

## 2022-02-25 RX ORDER — FENTANYL CITRATE 50 UG/ML
INJECTION, SOLUTION INTRAMUSCULAR; INTRAVENOUS AS NEEDED
Status: DISCONTINUED | OUTPATIENT
Start: 2022-02-25 | End: 2022-02-25 | Stop reason: SURG

## 2022-02-25 RX ORDER — PHENYLEPHRINE HYDROCHLORIDE 10 MG/ML
INJECTION INTRAVENOUS AS NEEDED
Status: DISCONTINUED | OUTPATIENT
Start: 2022-02-25 | End: 2022-02-25 | Stop reason: SURG

## 2022-02-25 RX ORDER — ROCURONIUM BROMIDE 10 MG/ML
INJECTION, SOLUTION INTRAVENOUS AS NEEDED
Status: DISCONTINUED | OUTPATIENT
Start: 2022-02-25 | End: 2022-02-25 | Stop reason: SURG

## 2022-02-25 RX ORDER — LABETALOL HYDROCHLORIDE 5 MG/ML
5 INJECTION, SOLUTION INTRAVENOUS
Status: DISCONTINUED | OUTPATIENT
Start: 2022-02-25 | End: 2022-02-25 | Stop reason: HOSPADM

## 2022-02-25 RX ORDER — DIPHENHYDRAMINE HYDROCHLORIDE 50 MG/ML
12.5 INJECTION INTRAMUSCULAR; INTRAVENOUS
Status: DISCONTINUED | OUTPATIENT
Start: 2022-02-25 | End: 2022-02-25 | Stop reason: HOSPADM

## 2022-02-25 RX ORDER — LIDOCAINE HYDROCHLORIDE 10 MG/ML
0.5 INJECTION, SOLUTION EPIDURAL; INFILTRATION; INTRACAUDAL; PERINEURAL ONCE AS NEEDED
Status: DISCONTINUED | OUTPATIENT
Start: 2022-02-25 | End: 2022-02-25 | Stop reason: HOSPADM

## 2022-02-25 RX ORDER — SODIUM CHLORIDE 0.9 % (FLUSH) 0.9 %
3-10 SYRINGE (ML) INJECTION AS NEEDED
Status: DISCONTINUED | OUTPATIENT
Start: 2022-02-25 | End: 2022-02-25 | Stop reason: HOSPADM

## 2022-02-25 RX ORDER — PROMETHAZINE HYDROCHLORIDE 25 MG/1
25 SUPPOSITORY RECTAL ONCE AS NEEDED
Status: DISCONTINUED | OUTPATIENT
Start: 2022-02-25 | End: 2022-02-25 | Stop reason: HOSPADM

## 2022-02-25 RX ORDER — GLYCOPYRROLATE 0.2 MG/ML
INJECTION INTRAMUSCULAR; INTRAVENOUS AS NEEDED
Status: DISCONTINUED | OUTPATIENT
Start: 2022-02-25 | End: 2022-02-25 | Stop reason: SURG

## 2022-02-25 RX ORDER — ACETAMINOPHEN 325 MG/1
650 TABLET ORAL EVERY 4 HOURS PRN
Status: DISCONTINUED | OUTPATIENT
Start: 2022-02-25 | End: 2022-02-28 | Stop reason: HOSPADM

## 2022-02-25 RX ORDER — EPHEDRINE SULFATE 50 MG/ML
5 INJECTION, SOLUTION INTRAVENOUS ONCE AS NEEDED
Status: DISCONTINUED | OUTPATIENT
Start: 2022-02-25 | End: 2022-02-25 | Stop reason: HOSPADM

## 2022-02-25 RX ORDER — NITROGLYCERIN 0.4 MG/1
0.4 TABLET SUBLINGUAL
Status: DISCONTINUED | OUTPATIENT
Start: 2022-02-25 | End: 2022-02-28 | Stop reason: HOSPADM

## 2022-02-25 RX ORDER — METOPROLOL SUCCINATE 50 MG/1
50 TABLET, EXTENDED RELEASE ORAL DAILY
Status: DISCONTINUED | OUTPATIENT
Start: 2022-02-25 | End: 2022-02-28 | Stop reason: HOSPADM

## 2022-02-25 RX ORDER — PROMETHAZINE HYDROCHLORIDE 25 MG/1
25 TABLET ORAL ONCE AS NEEDED
Status: DISCONTINUED | OUTPATIENT
Start: 2022-02-25 | End: 2022-02-25 | Stop reason: HOSPADM

## 2022-02-25 RX ORDER — ASPIRIN 325 MG
325 TABLET ORAL DAILY
Status: DISCONTINUED | OUTPATIENT
Start: 2022-02-25 | End: 2022-02-28 | Stop reason: HOSPADM

## 2022-02-25 RX ORDER — SODIUM CHLORIDE, SODIUM LACTATE, POTASSIUM CHLORIDE, CALCIUM CHLORIDE 600; 310; 30; 20 MG/100ML; MG/100ML; MG/100ML; MG/100ML
9 INJECTION, SOLUTION INTRAVENOUS CONTINUOUS
Status: DISCONTINUED | OUTPATIENT
Start: 2022-02-25 | End: 2022-02-25

## 2022-02-25 RX ORDER — NEOSTIGMINE METHYLSULFATE 0.5 MG/ML
INJECTION, SOLUTION INTRAVENOUS AS NEEDED
Status: DISCONTINUED | OUTPATIENT
Start: 2022-02-25 | End: 2022-02-25 | Stop reason: SURG

## 2022-02-25 RX ORDER — MIDAZOLAM HYDROCHLORIDE 1 MG/ML
0.5 INJECTION INTRAMUSCULAR; INTRAVENOUS
Status: DISCONTINUED | OUTPATIENT
Start: 2022-02-25 | End: 2022-02-25 | Stop reason: HOSPADM

## 2022-02-25 RX ORDER — FLUMAZENIL 0.1 MG/ML
0.2 INJECTION INTRAVENOUS AS NEEDED
Status: DISCONTINUED | OUTPATIENT
Start: 2022-02-25 | End: 2022-02-25 | Stop reason: HOSPADM

## 2022-02-25 RX ORDER — NALOXONE HCL 0.4 MG/ML
0.2 VIAL (ML) INJECTION AS NEEDED
Status: DISCONTINUED | OUTPATIENT
Start: 2022-02-25 | End: 2022-02-25 | Stop reason: HOSPADM

## 2022-02-25 RX ORDER — PROTAMINE SULFATE 10 MG/ML
INJECTION, SOLUTION INTRAVENOUS AS NEEDED
Status: DISCONTINUED | OUTPATIENT
Start: 2022-02-25 | End: 2022-02-25 | Stop reason: SURG

## 2022-02-25 RX ORDER — CETIRIZINE HYDROCHLORIDE 10 MG/1
10 TABLET ORAL DAILY
Status: DISCONTINUED | OUTPATIENT
Start: 2022-02-25 | End: 2022-02-28 | Stop reason: HOSPADM

## 2022-02-25 RX ADMIN — ATORVASTATIN CALCIUM 40 MG: 20 TABLET, FILM COATED ORAL at 17:30

## 2022-02-25 RX ADMIN — FENTANYL CITRATE 50 MCG: 50 INJECTION INTRAMUSCULAR; INTRAVENOUS at 14:38

## 2022-02-25 RX ADMIN — FAMOTIDINE 20 MG: 10 INJECTION INTRAVENOUS at 09:57

## 2022-02-25 RX ADMIN — LIDOCAINE HYDROCHLORIDE 100 MG: 20 INJECTION, SOLUTION INFILTRATION; PERINEURAL at 10:17

## 2022-02-25 RX ADMIN — FENTANYL CITRATE 50 MCG: 0.05 INJECTION, SOLUTION INTRAMUSCULAR; INTRAVENOUS at 10:50

## 2022-02-25 RX ADMIN — SODIUM CHLORIDE, POTASSIUM CHLORIDE, SODIUM LACTATE AND CALCIUM CHLORIDE 100 ML/HR: 600; 310; 30; 20 INJECTION, SOLUTION INTRAVENOUS at 17:33

## 2022-02-25 RX ADMIN — PROPOFOL 110 MG: 10 INJECTION, EMULSION INTRAVENOUS at 10:17

## 2022-02-25 RX ADMIN — HEPARIN SODIUM 2500 UNITS: 1000 INJECTION INTRAVENOUS; SUBCUTANEOUS at 12:21

## 2022-02-25 RX ADMIN — MIDAZOLAM 2 MG: 1 INJECTION INTRAMUSCULAR; INTRAVENOUS at 08:51

## 2022-02-25 RX ADMIN — SODIUM CHLORIDE, POTASSIUM CHLORIDE, SODIUM LACTATE AND CALCIUM CHLORIDE: 600; 310; 30; 20 INJECTION, SOLUTION INTRAVENOUS at 11:31

## 2022-02-25 RX ADMIN — FENTANYL CITRATE 50 MCG: 0.05 INJECTION, SOLUTION INTRAMUSCULAR; INTRAVENOUS at 13:48

## 2022-02-25 RX ADMIN — FENTANYL CITRATE 25 MCG: 0.05 INJECTION, SOLUTION INTRAMUSCULAR; INTRAVENOUS at 11:12

## 2022-02-25 RX ADMIN — FENTANYL CITRATE 25 MCG: 0.05 INJECTION, SOLUTION INTRAMUSCULAR; INTRAVENOUS at 12:07

## 2022-02-25 RX ADMIN — SODIUM CHLORIDE, POTASSIUM CHLORIDE, SODIUM LACTATE AND CALCIUM CHLORIDE 9 ML/HR: 600; 310; 30; 20 INJECTION, SOLUTION INTRAVENOUS at 13:57

## 2022-02-25 RX ADMIN — ROCURONIUM BROMIDE 50 MG: 50 INJECTION INTRAVENOUS at 10:18

## 2022-02-25 RX ADMIN — PROTAMINE SULFATE 50 MG: 10 INJECTION, SOLUTION INTRAVENOUS at 12:55

## 2022-02-25 RX ADMIN — ONDANSETRON 4 MG: 2 INJECTION INTRAMUSCULAR; INTRAVENOUS at 13:13

## 2022-02-25 RX ADMIN — PHENYLEPHRINE HYDROCHLORIDE 100 MCG: 10 INJECTION, SOLUTION INTRAVENOUS at 13:10

## 2022-02-25 RX ADMIN — GLYCOPYRROLATE 0.6 MG: 0.2 INJECTION INTRAMUSCULAR; INTRAVENOUS at 13:26

## 2022-02-25 RX ADMIN — PHENYLEPHRINE HYDROCHLORIDE 100 MCG: 10 INJECTION, SOLUTION INTRAVENOUS at 12:25

## 2022-02-25 RX ADMIN — NEOSTIGMINE METHYLSULFATE 4 MG: 0.5 INJECTION INTRAVENOUS at 13:26

## 2022-02-25 RX ADMIN — ASPIRIN 325 MG: 325 TABLET ORAL at 17:30

## 2022-02-25 RX ADMIN — CEFAZOLIN SODIUM 2 G: 2 INJECTION, SOLUTION INTRAVENOUS at 10:01

## 2022-02-25 RX ADMIN — HYDROCODONE BITARTRATE AND ACETAMINOPHEN 1 TABLET: 5; 325 TABLET ORAL at 22:37

## 2022-02-25 RX ADMIN — EPHEDRINE SULFATE 5 MG: 50 INJECTION INTRAVENOUS at 11:53

## 2022-02-25 RX ADMIN — CEFAZOLIN SODIUM 2 G: 2 INJECTION, SOLUTION INTRAVENOUS at 17:30

## 2022-02-25 RX ADMIN — FENTANYL CITRATE 50 MCG: 0.05 INJECTION, SOLUTION INTRAMUSCULAR; INTRAVENOUS at 13:22

## 2022-02-25 RX ADMIN — HEPARIN SODIUM 10000 UNITS: 1000 INJECTION INTRAVENOUS; SUBCUTANEOUS at 11:30

## 2022-02-25 RX ADMIN — SODIUM CHLORIDE, POTASSIUM CHLORIDE, SODIUM LACTATE AND CALCIUM CHLORIDE 9 ML/HR: 600; 310; 30; 20 INJECTION, SOLUTION INTRAVENOUS at 09:54

## 2022-02-25 RX ADMIN — PHENYLEPHRINE HYDROCHLORIDE 50 MCG: 10 INJECTION, SOLUTION INTRAVENOUS at 12:15

## 2022-02-25 RX ADMIN — ROCURONIUM BROMIDE 20 MG: 50 INJECTION INTRAVENOUS at 11:13

## 2022-02-25 NOTE — ANESTHESIA PROCEDURE NOTES
Airway  Urgency: elective    Date/Time: 2/25/2022 10:21 AM  Airway not difficult    General Information and Staff    Patient location during procedure: OR  Anesthesiologist: Richard Cloud MD  CRNA: Victor Manuel Palma CRNA    Indications and Patient Condition  Indications for airway management: airway protection    Preoxygenated: yes  MILS not maintained throughout  Mask difficulty assessment: 2 - vent by mask + OA or adjuvant +/- NMBA    Final Airway Details  Final airway type: endotracheal airway      Successful airway: ETT  Cuffed: yes   Successful intubation technique: direct laryngoscopy  Endotracheal tube insertion site: oral  Blade: Chris  Blade size: 3  ETT size (mm): 7.0  Cormack-Lehane Classification: grade IIa - partial view of glottis  Placement verified by: capnometry   Cuff volume (mL): 8  Measured from: lips  ETT/EBT  to lips (cm): 22  Number of attempts at approach: 1  Assessment: lips, teeth, and gum same as pre-op and atraumatic intubation

## 2022-02-25 NOTE — ANESTHESIA PROCEDURE NOTES
Arterial Line    Pre-sedation assessment completed: 2/25/2022 8:56 AM    Patient reassessed immediately prior to procedure    Patient location during procedure: holding area  Start time: 2/25/2022 8:56 AM  Stop Time:2/25/2022 9:00 AM       Line placed for hemodynamic monitoring.  Performed By   Anesthesiologist: Evan Mccall MD  Preanesthetic Checklist  Completed: patient identified, IV checked, site marked, risks and benefits discussed, surgical consent, monitors and equipment checked, pre-op evaluation and timeout performed  Arterial Line Prep   Sterile Tech: gloves and sterile barriers  Prep: ChloraPrep  Patient monitoring: blood pressure monitoring, continuous pulse oximetry and EKG  Arterial Line Procedure   Laterality:left  Location:  radial artery  Catheter size: 20 G   Guidance: ultrasound guided  PROCEDURE NOTE/ULTRASOUND INTERPRETATION.  Using ultrasound guidance the potential vascular sites for insertion of the catheter were visualized to determine the patency of the vessel to be used for vascular access.  After selecting the appropriate site for insertion, the needle was visualized under ultrasound being inserted into the radial artery, followed by ultrasound confirmation of wire and catheter placement. There were no abnormalities seen on ultrasound; an image was taken; and the patient tolerated the procedure with no complications.   Number of attempts: 1  Successful placement: yes  Post Assessment   Dressing Type: occlusive dressing applied, secured with tape and wrist guard applied.   Complications no  Circ/Move/Sens Assessment: unchanged.   Patient Tolerance: patient tolerated the procedure well with no apparent complications  Additional Notes  Ultrasound used for needle and catheter placement into the artery.

## 2022-02-25 NOTE — ANESTHESIA PREPROCEDURE EVALUATION
Anesthesia Evaluation     Patient summary reviewed and Nursing notes reviewed   NPO Solid Status: > 8 hours  NPO Liquid Status: > 2 hours           Airway   Mallampati: II  TM distance: >3 FB  Neck ROM: full  Dental - normal exam     Pulmonary - negative pulmonary ROS and normal exam   Cardiovascular - normal exam    ECG reviewed    (+) hypertension, past MI , CAD, PVD, hyperlipidemia,     ROS comment:  EF = 47.3%    Neuro/Psych- negative ROS  GI/Hepatic/Renal/Endo    (+)  GERD,  diabetes mellitus type 2,     Musculoskeletal     (+) back pain,   Abdominal    Substance History - negative use     OB/GYN negative ob/gyn ROS         Other   arthritis,                      Anesthesia Plan    ASA 3     general and [place]       Anesthetic plan, all risks, benefits, and alternatives have been provided, discussed and informed consent has been obtained with: patient.        CODE STATUS:

## 2022-02-25 NOTE — ANESTHESIA POSTPROCEDURE EVALUATION
Patient: Niraj Hassan    Procedure Summary     Date: 02/25/22 Room / Location: [facility] OR [address] [place] [address] MAIN OR    Anesthesia Start: 1007 Anesthesia Stop: 1348    Procedure: RIGHT FEMORAL BELOW KNEE  POPLITEAL BYPASS (Right Thigh) Diagnosis:     Surgeons: Dharmesh Collazo MD Provider: Addy Wu MD    Anesthesia Type: general, Mackenzie ASA Status: 3          Anesthesia Type: general, Mackenzie    Vitals  Vitals Value Taken Time   /70 02/25/22 1516   Temp 36.4 °C (97.6 °F) 02/25/22 1349   Pulse 75 02/25/22 1530   Resp 14 02/25/22 1515   SpO2 95 % 02/25/22 1530   Vitals shown include unvalidated device data.        Post Anesthesia Care and Evaluation    Patient location during evaluation: PHASE II  Anesthetic complications: No anesthetic complications

## 2022-02-26 LAB
ANION GAP SERPL CALCULATED.3IONS-SCNC: 11 MMOL/L (ref 5–15)
BASOPHILS # BLD AUTO: 0.02 10*3/MM3 (ref 0–0.2)
BASOPHILS NFR BLD AUTO: 0.2 % (ref 0–1.5)
BUN SERPL-MCNC: 16 MG/DL (ref 8–23)
BUN/CREAT SERPL: 21.6 (ref 7–25)
CALCIUM SPEC-SCNC: 7.9 MG/DL (ref 8.6–10.5)
CHLORIDE SERPL-SCNC: 102 MMOL/L (ref 98–107)
CO2 SERPL-SCNC: 24 MMOL/L (ref 22–29)
CREAT SERPL-MCNC: 0.74 MG/DL (ref 0.76–1.27)
DEPRECATED RDW RBC AUTO: 43 FL (ref 37–54)
EOSINOPHIL # BLD AUTO: 0 10*3/MM3 (ref 0–0.4)
EOSINOPHIL NFR BLD AUTO: 0 % (ref 0.3–6.2)
ERYTHROCYTE [DISTWIDTH] IN BLOOD BY AUTOMATED COUNT: 12.2 % (ref 12.3–15.4)
GFR SERPL CREATININE-BSD FRML MDRD: 103 ML/MIN/1.73
GLUCOSE BLDC GLUCOMTR-MCNC: 156 MG/DL (ref 70–130)
GLUCOSE BLDC GLUCOMTR-MCNC: 238 MG/DL (ref 70–130)
GLUCOSE BLDC GLUCOMTR-MCNC: 241 MG/DL (ref 70–130)
GLUCOSE BLDC GLUCOMTR-MCNC: 297 MG/DL (ref 70–130)
GLUCOSE SERPL-MCNC: 258 MG/DL (ref 65–99)
HCT VFR BLD AUTO: 27.9 % (ref 37.5–51)
HGB BLD-MCNC: 9.4 G/DL (ref 13–17.7)
IMM GRANULOCYTES # BLD AUTO: 0.04 10*3/MM3 (ref 0–0.05)
IMM GRANULOCYTES NFR BLD AUTO: 0.4 % (ref 0–0.5)
LYMPHOCYTES # BLD AUTO: 1.7 10*3/MM3 (ref 0.7–3.1)
LYMPHOCYTES NFR BLD AUTO: 17.8 % (ref 19.6–45.3)
MCH RBC QN AUTO: 32.6 PG (ref 26.6–33)
MCHC RBC AUTO-ENTMCNC: 33.7 G/DL (ref 31.5–35.7)
MCV RBC AUTO: 96.9 FL (ref 79–97)
MONOCYTES # BLD AUTO: 0.92 10*3/MM3 (ref 0.1–0.9)
MONOCYTES NFR BLD AUTO: 9.6 % (ref 5–12)
NEUTROPHILS NFR BLD AUTO: 6.86 10*3/MM3 (ref 1.7–7)
NEUTROPHILS NFR BLD AUTO: 72 % (ref 42.7–76)
NRBC BLD AUTO-RTO: 0 /100 WBC (ref 0–0.2)
PLATELET # BLD AUTO: 206 10*3/MM3 (ref 140–450)
PMV BLD AUTO: 10.1 FL (ref 6–12)
POTASSIUM SERPL-SCNC: 4.2 MMOL/L (ref 3.5–5.2)
RBC # BLD AUTO: 2.88 10*6/MM3 (ref 4.14–5.8)
SODIUM SERPL-SCNC: 137 MMOL/L (ref 136–145)
WBC NRBC COR # BLD: 9.54 10*3/MM3 (ref 3.4–10.8)

## 2022-02-26 PROCEDURE — 25010000002 MORPHINE PER 10 MG: Performed by: SURGERY

## 2022-02-26 PROCEDURE — 0 CEFAZOLIN IN DEXTROSE 2-4 GM/100ML-% SOLUTION: Performed by: SURGERY

## 2022-02-26 PROCEDURE — 63710000001 INSULIN LISPRO (HUMAN) PER 5 UNITS: Performed by: SURGERY

## 2022-02-26 PROCEDURE — 85025 COMPLETE CBC W/AUTO DIFF WBC: CPT | Performed by: SURGERY

## 2022-02-26 PROCEDURE — 80048 BASIC METABOLIC PNL TOTAL CA: CPT | Performed by: SURGERY

## 2022-02-26 PROCEDURE — 82962 GLUCOSE BLOOD TEST: CPT

## 2022-02-26 PROCEDURE — 25010000002 ENOXAPARIN PER 10 MG: Performed by: SURGERY

## 2022-02-26 RX ORDER — GLIPIZIDE 5 MG/1
5 TABLET ORAL
Status: DISCONTINUED | OUTPATIENT
Start: 2022-02-26 | End: 2022-02-28 | Stop reason: HOSPADM

## 2022-02-26 RX ADMIN — GLIPIZIDE 5 MG: 5 TABLET ORAL at 16:25

## 2022-02-26 RX ADMIN — HYDROCODONE BITARTRATE AND ACETAMINOPHEN 1 TABLET: 5; 325 TABLET ORAL at 16:25

## 2022-02-26 RX ADMIN — HYDROCODONE BITARTRATE AND ACETAMINOPHEN 1 TABLET: 5; 325 TABLET ORAL at 21:55

## 2022-02-26 RX ADMIN — ASPIRIN 325 MG: 325 TABLET ORAL at 08:26

## 2022-02-26 RX ADMIN — METFORMIN HYDROCHLORIDE 1000 MG: 1000 TABLET ORAL at 10:17

## 2022-02-26 RX ADMIN — PANTOPRAZOLE SODIUM 40 MG: 40 TABLET, DELAYED RELEASE ORAL at 05:56

## 2022-02-26 RX ADMIN — INSULIN LISPRO 4 UNITS: 100 INJECTION, SOLUTION INTRAVENOUS; SUBCUTANEOUS at 11:31

## 2022-02-26 RX ADMIN — HYDROCODONE BITARTRATE AND ACETAMINOPHEN 1 TABLET: 5; 325 TABLET ORAL at 04:09

## 2022-02-26 RX ADMIN — ATORVASTATIN CALCIUM 40 MG: 20 TABLET, FILM COATED ORAL at 08:25

## 2022-02-26 RX ADMIN — SODIUM CHLORIDE, POTASSIUM CHLORIDE, SODIUM LACTATE AND CALCIUM CHLORIDE 100 ML/HR: 600; 310; 30; 20 INJECTION, SOLUTION INTRAVENOUS at 01:14

## 2022-02-26 RX ADMIN — METOPROLOL SUCCINATE 50 MG: 50 TABLET, EXTENDED RELEASE ORAL at 08:25

## 2022-02-26 RX ADMIN — HYDROCODONE BITARTRATE AND ACETAMINOPHEN 1 TABLET: 5; 325 TABLET ORAL at 08:29

## 2022-02-26 RX ADMIN — CEFAZOLIN SODIUM 2 G: 2 INJECTION, SOLUTION INTRAVENOUS at 01:12

## 2022-02-26 RX ADMIN — LISINOPRIL: 10 TABLET ORAL at 08:26

## 2022-02-26 RX ADMIN — MORPHINE SULFATE 2 MG: 2 INJECTION, SOLUTION INTRAMUSCULAR; INTRAVENOUS at 23:45

## 2022-02-26 RX ADMIN — INSULIN LISPRO 6 UNITS: 100 INJECTION, SOLUTION INTRAVENOUS; SUBCUTANEOUS at 07:37

## 2022-02-26 RX ADMIN — APIXABAN 5 MG: 5 TABLET, FILM COATED ORAL at 21:55

## 2022-02-26 RX ADMIN — CETIRIZINE HYDROCHLORIDE 10 MG: 10 TABLET ORAL at 08:26

## 2022-02-26 RX ADMIN — INSULIN LISPRO 4 UNITS: 100 INJECTION, SOLUTION INTRAVENOUS; SUBCUTANEOUS at 16:25

## 2022-02-26 RX ADMIN — ENOXAPARIN SODIUM 40 MG: 100 INJECTION SUBCUTANEOUS at 08:25

## 2022-02-26 RX ADMIN — HYDROCODONE BITARTRATE AND ACETAMINOPHEN 1 TABLET: 5; 325 TABLET ORAL at 12:27

## 2022-02-27 LAB
GLUCOSE BLDC GLUCOMTR-MCNC: 163 MG/DL (ref 70–130)
GLUCOSE BLDC GLUCOMTR-MCNC: 196 MG/DL (ref 70–130)
GLUCOSE BLDC GLUCOMTR-MCNC: 219 MG/DL (ref 70–130)
GLUCOSE BLDC GLUCOMTR-MCNC: 253 MG/DL (ref 70–130)

## 2022-02-27 PROCEDURE — 94799 UNLISTED PULMONARY SVC/PX: CPT

## 2022-02-27 PROCEDURE — 63710000001 INSULIN LISPRO (HUMAN) PER 5 UNITS: Performed by: SURGERY

## 2022-02-27 PROCEDURE — 82962 GLUCOSE BLOOD TEST: CPT

## 2022-02-27 PROCEDURE — 94761 N-INVAS EAR/PLS OXIMETRY MLT: CPT

## 2022-02-27 RX ORDER — POLYETHYLENE GLYCOL 3350 17 G/17G
17 POWDER, FOR SOLUTION ORAL DAILY
Status: DISCONTINUED | OUTPATIENT
Start: 2022-02-27 | End: 2022-02-28 | Stop reason: HOSPADM

## 2022-02-27 RX ADMIN — METOPROLOL SUCCINATE 50 MG: 50 TABLET, EXTENDED RELEASE ORAL at 08:23

## 2022-02-27 RX ADMIN — HYDROCODONE BITARTRATE AND ACETAMINOPHEN 1 TABLET: 5; 325 TABLET ORAL at 10:00

## 2022-02-27 RX ADMIN — HYDROCODONE BITARTRATE AND ACETAMINOPHEN 1 TABLET: 5; 325 TABLET ORAL at 06:06

## 2022-02-27 RX ADMIN — HYDROCODONE BITARTRATE AND ACETAMINOPHEN 1 TABLET: 5; 325 TABLET ORAL at 17:55

## 2022-02-27 RX ADMIN — GLIPIZIDE 5 MG: 5 TABLET ORAL at 17:55

## 2022-02-27 RX ADMIN — LISINOPRIL: 10 TABLET ORAL at 08:23

## 2022-02-27 RX ADMIN — INSULIN LISPRO 2 UNITS: 100 INJECTION, SOLUTION INTRAVENOUS; SUBCUTANEOUS at 17:55

## 2022-02-27 RX ADMIN — METFORMIN HYDROCHLORIDE 1000 MG: 1000 TABLET ORAL at 08:22

## 2022-02-27 RX ADMIN — ASPIRIN 325 MG: 325 TABLET ORAL at 08:22

## 2022-02-27 RX ADMIN — APIXABAN 5 MG: 5 TABLET, FILM COATED ORAL at 20:28

## 2022-02-27 RX ADMIN — APIXABAN 5 MG: 5 TABLET, FILM COATED ORAL at 08:23

## 2022-02-27 RX ADMIN — CETIRIZINE HYDROCHLORIDE 10 MG: 10 TABLET ORAL at 08:23

## 2022-02-27 RX ADMIN — PANTOPRAZOLE SODIUM 40 MG: 40 TABLET, DELAYED RELEASE ORAL at 06:06

## 2022-02-27 RX ADMIN — INSULIN LISPRO 2 UNITS: 100 INJECTION, SOLUTION INTRAVENOUS; SUBCUTANEOUS at 08:22

## 2022-02-27 RX ADMIN — SODIUM CHLORIDE, POTASSIUM CHLORIDE, SODIUM LACTATE AND CALCIUM CHLORIDE 50 ML/HR: 600; 310; 30; 20 INJECTION, SOLUTION INTRAVENOUS at 06:06

## 2022-02-27 RX ADMIN — POLYETHYLENE GLYCOL 3350 17 G: 17 POWDER, FOR SOLUTION ORAL at 08:22

## 2022-02-27 RX ADMIN — METFORMIN HYDROCHLORIDE 1000 MG: 1000 TABLET ORAL at 17:55

## 2022-02-27 RX ADMIN — INSULIN LISPRO 4 UNITS: 100 INJECTION, SOLUTION INTRAVENOUS; SUBCUTANEOUS at 11:40

## 2022-02-27 RX ADMIN — HYDROCODONE BITARTRATE AND ACETAMINOPHEN 1 TABLET: 5; 325 TABLET ORAL at 14:07

## 2022-02-27 RX ADMIN — ATORVASTATIN CALCIUM 40 MG: 20 TABLET, FILM COATED ORAL at 08:22

## 2022-02-28 ENCOUNTER — READMISSION MANAGEMENT (OUTPATIENT)
Dept: CALL CENTER | Facility: HOSPITAL | Age: 77
End: 2022-02-28

## 2022-02-28 VITALS
HEIGHT: 68 IN | WEIGHT: 148.59 LBS | SYSTOLIC BLOOD PRESSURE: 121 MMHG | HEART RATE: 74 BPM | BODY MASS INDEX: 22.52 KG/M2 | TEMPERATURE: 98.3 F | DIASTOLIC BLOOD PRESSURE: 63 MMHG | OXYGEN SATURATION: 93 % | RESPIRATION RATE: 16 BRPM

## 2022-02-28 LAB
ACT BLD: 112 SECONDS (ref 82–152)
ACT BLD: 130 SECONDS (ref 82–152)
ACT BLD: 255 SECONDS (ref 82–152)
ACT BLD: 255 SECONDS (ref 82–152)
ACT BLD: 321 SECONDS (ref 82–152)
ACT BLD: 95 SECONDS (ref 82–152)
GLUCOSE BLDC GLUCOMTR-MCNC: 167 MG/DL (ref 70–130)
GLUCOSE BLDC GLUCOMTR-MCNC: 219 MG/DL (ref 70–130)

## 2022-02-28 PROCEDURE — 82962 GLUCOSE BLOOD TEST: CPT

## 2022-02-28 PROCEDURE — 63710000001 INSULIN LISPRO (HUMAN) PER 5 UNITS: Performed by: SURGERY

## 2022-02-28 RX ORDER — HYDROCODONE BITARTRATE AND ACETAMINOPHEN 5; 325 MG/1; MG/1
1 TABLET ORAL EVERY 8 HOURS PRN
Qty: 25 TABLET | Refills: 0 | Status: SHIPPED | OUTPATIENT
Start: 2022-02-28 | End: 2022-03-09

## 2022-02-28 RX ADMIN — ATORVASTATIN CALCIUM 40 MG: 20 TABLET, FILM COATED ORAL at 08:00

## 2022-02-28 RX ADMIN — SODIUM CHLORIDE, POTASSIUM CHLORIDE, SODIUM LACTATE AND CALCIUM CHLORIDE 50 ML/HR: 600; 310; 30; 20 INJECTION, SOLUTION INTRAVENOUS at 02:22

## 2022-02-28 RX ADMIN — HYDROCODONE BITARTRATE AND ACETAMINOPHEN 1 TABLET: 5; 325 TABLET ORAL at 08:09

## 2022-02-28 RX ADMIN — PANTOPRAZOLE SODIUM 40 MG: 40 TABLET, DELAYED RELEASE ORAL at 07:01

## 2022-02-28 RX ADMIN — POLYETHYLENE GLYCOL 3350 17 G: 17 POWDER, FOR SOLUTION ORAL at 08:00

## 2022-02-28 RX ADMIN — APIXABAN 5 MG: 5 TABLET, FILM COATED ORAL at 08:01

## 2022-02-28 RX ADMIN — LISINOPRIL: 10 TABLET ORAL at 08:01

## 2022-02-28 RX ADMIN — METFORMIN HYDROCHLORIDE 1000 MG: 1000 TABLET ORAL at 08:01

## 2022-02-28 RX ADMIN — ASPIRIN 325 MG: 325 TABLET ORAL at 08:01

## 2022-02-28 RX ADMIN — CETIRIZINE HYDROCHLORIDE 10 MG: 10 TABLET ORAL at 08:01

## 2022-02-28 RX ADMIN — INSULIN LISPRO 2 UNITS: 100 INJECTION, SOLUTION INTRAVENOUS; SUBCUTANEOUS at 07:01

## 2022-02-28 RX ADMIN — INSULIN LISPRO 4 UNITS: 100 INJECTION, SOLUTION INTRAVENOUS; SUBCUTANEOUS at 11:26

## 2022-02-28 RX ADMIN — METOPROLOL SUCCINATE 50 MG: 50 TABLET, EXTENDED RELEASE ORAL at 08:00

## 2022-03-01 ENCOUNTER — TRANSITIONAL CARE MANAGEMENT TELEPHONE ENCOUNTER (OUTPATIENT)
Dept: CALL CENTER | Facility: HOSPITAL | Age: 77
End: 2022-03-01

## 2022-03-01 NOTE — CASE MANAGEMENT/SOCIAL WORK
Continued Stay Note  [facility]     Patient Name: Niraj Hassan  MRN: [id number]  Today's Date: 3/1/2022    Admit Date: 2/25/2022     Discharge Plan     Row Name 03/01/22 0848       Plan    Final Discharge Disposition Code 01 - home or self-care    Final Note Pt discharged home, no known needs.  PRECIOUS Pemberton RN               Discharge Codes    No documentation.               Expected Discharge Date and Time     Expected Discharge Date Expected Discharge Time    Feb 28, 2022             Nessa Pemberton RN

## 2022-03-01 NOTE — OUTREACH NOTE
Call Center TCM Note      Responses   [facility] patient discharged from? [place]   Does the patient have one of the following disease processes/diagnoses(primary or secondary)? Other   TCM attempt successful? Yes  [Verbal release- Sister]   Call start time 1510   Call end time 1513   Discharge diagnosis right femoral to below-knee popliteal artery bypass with [place] cuff utilizing 6 mm propatent graft.   Meds reviewed with patient/caregiver? Yes   Is the patient having any side effects they believe may be caused by any medication additions or changes? No   Does the patient have all medications ordered at discharge? Yes   Is the patient taking all medications as directed (includes completed medication regime)? Yes   Comments regarding appointments Surgeon - 3/15/22   Does the patient have a primary care provider?  Yes   Does the patient have an appointment with their PCP within 7 days of discharge? Yes   Comments regarding PCP HOSP DC FU appt 3/2/22 @ 3:15 pm.    Has the patient kept scheduled appointments due by today? N/A   Has home health visited the patient within 72 hours of discharge? N/A   What DME was ordered? [facility]   Has all DME been delivered? Yes   Psychosocial issues? No   Did the patient receive a copy of their discharge instructions? Yes   Nursing interventions Reviewed instructions with patient   What is the patient's perception of their health status since discharge? Improving   Is the patient/caregiver able to teach back signs and symptoms related to disease process for when to call PCP? Yes   Is the patient/caregiver able to teach back signs and symptoms related to disease process for when to call 911? Yes   Is the patient/caregiver able to teach back the hierarchy of who to call/visit for symptoms/problems? PCP, Specialist, Home health nurse, Urgent Care, ED, 911 Yes   If the patient is a current smoker, are they able to teach back resources for cessation?  Not a smoker   TCM call completed? Yes   Wrap up additional comments Pt reports he is doing well. Pain is being controlled. pt did get all meds and taking as ordered. Pt reports surgical sites free from s/s of infection.           Malinda Aguilar RN    3/1/2022, 15:14 EST

## 2022-03-02 ENCOUNTER — OFFICE VISIT (OUTPATIENT)
Dept: FAMILY MEDICINE CLINIC | Facility: CLINIC | Age: 77
End: 2022-03-02

## 2022-03-02 VITALS
TEMPERATURE: 96.4 F | OXYGEN SATURATION: 98 % | BODY MASS INDEX: 23.39 KG/M2 | DIASTOLIC BLOOD PRESSURE: 77 MMHG | WEIGHT: 154.3 LBS | SYSTOLIC BLOOD PRESSURE: 136 MMHG | HEART RATE: 98 BPM | HEIGHT: 68 IN

## 2022-03-02 DIAGNOSIS — I10 ESSENTIAL HYPERTENSION: ICD-10-CM

## 2022-03-02 DIAGNOSIS — Z09 HOSPITAL DISCHARGE FOLLOW-UP: Primary | ICD-10-CM

## 2022-03-02 DIAGNOSIS — I73.9 PVD (PERIPHERAL VASCULAR DISEASE): ICD-10-CM

## 2022-03-02 DIAGNOSIS — E11.40 TYPE 2 DIABETES MELLITUS WITH DIABETIC NEUROPATHY, WITHOUT LONG-TERM CURRENT USE OF INSULIN: ICD-10-CM

## 2022-03-02 PROCEDURE — 1111F DSCHRG MED/CURRENT MED MERGE: CPT | Performed by: NURSE PRACTITIONER

## 2022-03-02 PROCEDURE — 99496 TRANSJ CARE MGMT HIGH F2F 7D: CPT | Performed by: NURSE PRACTITIONER

## 2022-03-02 NOTE — PROGRESS NOTES
"Chief Complaint  Hospital Follow Up Visit (Hospital f/u for R leg pain )    Subjective          Niraj Hassan presents to [facility]  History of Present Illness pt is here for hospital follow up.  Admitted on 2/25/2022 and discharged on 2/28/2022.  Right leg graft became occluded most likely in November.  Patient then had a stent placed which failed.  Patient was then admitted for femoropopliteal.  At the time he was admitted it was not certain if it would be graft from his arm or artificial graft.  He was able to get an artificial graft from his groin to his foot.  He did well on his hospital course.  Has some ulcers on his right lower leg that are very slow to heal.  They are being monitored by vascular.  He reports that they do seem to be improving.  He is applying some sort of topical, not sure of what this is.  He reports that the lateral wound has had more drainage and been slower to heal.  Still having quite a bit of swelling in his right lower leg but this also seems to be improving.  Sister is staying in his home to help with any ongoing needs.  He has been using his walker without any problems.  Elevates his leg when not on his walker.  Blood sugars in the hospital were up and down after he was taken off metformin.  He was on insulin for short time in hospital.  Has been off insulin and back on metformin since home and doing well.   BP was low in hospital and needed some fluids, since home, back on meds without side effects.  Quit tobacco on Jan 12th!    No chest pain, dizziness, palpitations, dyspnea, cough, abdominal pain, n/v/d. Does have constipation from pain meds.  Improving with miralax.  No melena. No fever.  Has had some recent evening chills.  Denies unusual bleeding or bruising.    Objective   Vital Signs:   /77   Pulse 98   Temp 96.4 °F (35.8 °C)   Ht 172.7 cm (68\")   Wt 70 kg (154 lb 4.8 oz)   SpO2 98%   BMI 23.46 kg/m²     Physical Exam  Vitals and " nursing note reviewed.   Constitutional:       General: He is not in acute distress.     Appearance: He is well-developed. He is not ill-appearing or diaphoretic.   HENT:      Head: Normocephalic and atraumatic.   Eyes:      General:         Right eye: No discharge.         Left eye: No discharge.      Conjunctiva/sclera: Conjunctivae normal.   Cardiovascular:      Rate and Rhythm: Normal rate and regular rhythm.      Heart sounds: Normal heart sounds.   Pulmonary:      Effort: Pulmonary effort is normal.      Breath sounds: Normal breath sounds.   Abdominal:      General: Bowel sounds are normal. There is no distension.      Palpations: Abdomen is soft.      Tenderness: There is no abdominal tenderness.   Musculoskeletal:      Comments: Gait smooth and steady   Skin:     General: Skin is warm and dry.      Comments: Plantar ulcer, medial and lateral ulcers have no s/s complications  Right medial leg incision with sutures intact, no erythema, swelling.  Small serosanguinous discharge on dsg  Foot and lower leg are swollen  Steady with ambulation using walker   Neurological:      General: No focal deficit present.      Mental Status: He is alert and oriented to person, place, and time.   Psychiatric:         Mood and Affect: Mood normal.         Behavior: Behavior normal.        Result Review :   The following data was reviewed by: AGATHA Umaña on 03/02/2022:  CBC    CBC 1/17/22 2/23/22 2/26/22   WBC 8.50 9.13 9.54   RBC 4.06 (A) 4.06 (A) 2.88 (A)   Hemoglobin 13.0 13.0 9.4 (A)   Hematocrit 38.5 38.7 27.9 (A)   MCV 94.8 95.3 96.9   MCH 32.0 32.0 32.6   MCHC 33.8 33.6 33.7   RDW 11.9 (A) 11.9 (A) 12.2 (A)   Platelets 242 289 206   (A) Abnormal value            BMP    BMP 1/17/22 2/23/22 2/26/22   BUN 19 13 16   Creatinine 0.84 0.84 0.74 (A)   Sodium 143 139 137   Potassium 4.8 4.4 4.2   Chloride 107 102 102   CO2 22.6 23.0 24.0   Calcium 9.3 9.6 7.9 (A)   (A) Abnormal value            Data reviewed: Recent  hospitalization notes discharge summary          Assessment and Plan    Diagnoses and all orders for this visit:    1. Hospital discharge follow-up (Primary)    2. PVD (peripheral vascular disease) (HCC)    3. Type 2 diabetes mellitus with diabetic neuropathy, without long-term current use of insulin (HCC)  -     Hemoglobin A1c    4. Essential hypertension      Patient seems to be recovering well from hospitalization.  He has a good understanding of treatment plan, medications and side effects.  At this point he has no home health care or assistance with wound care.  He feels that he is doing well and does not need any home health care at this point.  He will let me know if he starts having any complications from the wound.  Next assessment will be by vascular at follow-up.  We discussed signs and symptoms of complications that require emergent evaluation.    Its been about 4 months since his last A1c.  He has typically been at goal.  We will check A1c while he is here since blood sugar was up and down in the hospital to see if we need to make any modifications to medications to aid healing.      Hypertension: Blood pressure is good.  We discussed that if he has any dizziness he should let me know.  Recommend standing for brief time before walking to make sure he is steady.  Fall precautions.    Patient has quit smoking!  We discussed habits and when he is still craving cigarettes.  Using nicotine gum occasionally and gum.  He is aware of health complications from tobacco use.    Constipation secondary to pain medication seems to be improving.  We discussed titration with MiraLAX as needed.    Medications reconciled.  He is tolerating new Eliquis without any side effects or complications.  Discussed monitoring.  He is still on full-strength aspirin.        Follow Up   Return in about 3 months (around 6/2/2022).  Patient was given instructions and counseling regarding his condition or for health maintenance advice.  Please see specific information pulled into the AVS if appropriate.

## 2022-03-03 LAB — HBA1C MFR BLD: 7.8 % (ref 4.8–5.6)

## 2022-03-10 ENCOUNTER — READMISSION MANAGEMENT (OUTPATIENT)
Dept: CALL CENTER | Facility: HOSPITAL | Age: 77
End: 2022-03-10

## 2022-03-10 NOTE — OUTREACH NOTE
Medical Week 2 Survey    Flowsheet Row Responses   [facility] patient discharged from? [place]   Does the patient have one of the following disease processes/diagnoses(primary or secondary)? Other   Week 2 attempt successful? Yes   Call start time 1656   Discharge diagnosis right femoral to below-knee popliteal artery bypass with [place] cuff utilizing 6 mm propatent graft.   Call end time 1700   Is patient permission given to speak with other caregiver? No   Meds reviewed with patient/caregiver? Yes   Is the patient taking all medications as directed (includes completed medication regime)? Yes   Comments regarding appointments Surgeon - 3/15/22   Does the patient have a primary care provider?  Yes   Comments regarding PCP Patient has seen PCP since discharge.    Has the patient kept scheduled appointments due by today? Yes   Has home health visited the patient within 72 hours of discharge? N/A   What DME was ordered? [facility]   Has all DME been delivered? Yes   Psychosocial issues? No   What is the patient's perception of their health status since discharge? Improving   Is the patient/caregiver able to teach back the hierarchy of who to call/visit for symptoms/problems? PCP, Specialist, Home health nurse, Urgent Care, ED, 911 Yes   If the patient is a current smoker, are they able to teach back resources for cessation? Not a smoker   Week 2 Call Completed? Yes          LAUREN LARA - Registered Nurse

## 2022-03-18 ENCOUNTER — READMISSION MANAGEMENT (OUTPATIENT)
Dept: CALL CENTER | Facility: HOSPITAL | Age: 77
End: 2022-03-18

## 2022-03-18 NOTE — OUTREACH NOTE
Medical Week 3 Survey    Flowsheet Row Responses   [facility] patient discharged from? [place]   Does the patient have one of the following disease processes/diagnoses(primary or secondary)? Other   Week 3 attempt successful? Yes   Call start time 1131   Call end time 1135   Discharge diagnosis right femoral to below-knee popliteal artery bypass with [place] cuff utilizing 6 mm propatent graft.   Is the patient taking all medications as directed (includes completed medication regime)? Yes   Does the patient have a primary care provider?  Yes   Has the patient kept scheduled appointments due by today? Yes   Psychosocial issues? No   What is the patient's perception of their health status since discharge? Improving   Is the patient/caregiver able to teach back signs and symptoms related to disease process for when to call PCP? Yes   Is the patient/caregiver able to teach back signs and symptoms related to disease process for when to call 911? Yes   Is the patient/caregiver able to teach back the hierarchy of who to call/visit for symptoms/problems? PCP, Specialist, Home health nurse, Urgent Care, ED, 911 Yes   If the patient is a current smoker, are they able to teach back resources for cessation? Not a smoker   Additional teach back comments States he is doing better every day.  Wound is healing well and surgeon states he can take off bandages when he is comfortable.  States the only issue you has is sleeping.  Has tried melatonin with benadryl and worked for awhile.  Told him to try to not watch tv or be on electronically devices an hour or so before bedtime.  He states he is slowly trying to increase his activity and hoping this may help also.   Week 3 Call Completed? Yes   Graduated Yes   Graduated/Revoked comments Pt to try other alternative ways of helping to sleep.          NEGRA DODD - Licensed Nurse

## 2022-04-15 ENCOUNTER — OFFICE VISIT (OUTPATIENT)
Dept: CARDIOLOGY | Facility: CLINIC | Age: 77
End: 2022-04-15

## 2022-04-15 VITALS
HEIGHT: 68 IN | HEART RATE: 80 BPM | WEIGHT: 150.2 LBS | BODY MASS INDEX: 22.76 KG/M2 | SYSTOLIC BLOOD PRESSURE: 130 MMHG | DIASTOLIC BLOOD PRESSURE: 82 MMHG | OXYGEN SATURATION: 96 %

## 2022-04-15 DIAGNOSIS — E11.40 TYPE 2 DIABETES MELLITUS WITH DIABETIC NEUROPATHY, WITHOUT LONG-TERM CURRENT USE OF INSULIN: ICD-10-CM

## 2022-04-15 DIAGNOSIS — I25.10 CORONARY ARTERY DISEASE INVOLVING NATIVE CORONARY ARTERY OF NATIVE HEART WITHOUT ANGINA PECTORIS: Primary | ICD-10-CM

## 2022-04-15 DIAGNOSIS — I10 ESSENTIAL HYPERTENSION: ICD-10-CM

## 2022-04-15 DIAGNOSIS — E78.2 MIXED HYPERLIPIDEMIA: ICD-10-CM

## 2022-04-15 DIAGNOSIS — R68.89 ABNORMAL ANKLE BRACHIAL INDEX (ABI): ICD-10-CM

## 2022-04-15 DIAGNOSIS — I73.9 PVD (PERIPHERAL VASCULAR DISEASE): ICD-10-CM

## 2022-04-15 DIAGNOSIS — I73.9 CLAUDICATION: ICD-10-CM

## 2022-04-15 DIAGNOSIS — I73.9 PAD (PERIPHERAL ARTERY DISEASE): ICD-10-CM

## 2022-04-15 PROCEDURE — 99213 OFFICE O/P EST LOW 20 MIN: CPT | Performed by: NURSE PRACTITIONER

## 2022-04-15 PROCEDURE — 93000 ELECTROCARDIOGRAM COMPLETE: CPT | Performed by: NURSE PRACTITIONER

## 2022-04-15 NOTE — PROGRESS NOTES
Date of Office Visit: 04/15/2022  Encounter Provider: AGATHA Grant  Place of Service: [facility] CARDIOLOGY  Patient Name: Niraj Hassan  :1945    Chief Complaint   Patient presents with   • Coronary Artery Disease   • Mixed hyperlipidemia   • Follow-up   :     HPI: Niraj Hassan is a 76 y.o. male who is a patient of Dr. Manriquez and is new to me today.  He has a history of coronary disease as well as peripheral arterial disease and ischemic cardiomyopathy. He had an angioplasty to the circumflex prior to  but had a repeat cath at that time that showed mild disease of the circumflex and  of the RCA with left-to-right collaterals.  He was noted to have a circumflex distribution MI but his circumflex following that and had occluded his circumflex.  He was treated medically. He has also had a femoropopliteal to the right lower extremity.  He is still getting some swelling in that leg.  His leg does feel better his toes are still numb but overall he does not have the cramping and pain that he did have.  He denies any chest pain pressure or tightness.    Previous testing and notes have been reviewed by me.   Past Medical History:   Diagnosis Date   • AAA (abdominal aortic aneurysm) ([facility])    • Allergic     Had for years. Mold is the main.   • Abreu esophagus    • CAD (coronary artery disease)    • DDD (degenerative disc disease), lumbar 2021   • Diabetes mellitus ([facility])    • Enlarged prostate    • GERD (gastroesophageal reflux disease)    • Hyperlipidemia    • Hypertension    • Lumbar neuritis 2021   • Myocardial infarction ([facility]) ,    • Neuropathy     RT FOOT   • Osteoarthritis    • PVD (peripheral vascular disease) ([facility])    • Sciatic nerve pain    • Wound of right leg     STATED SIZE OF QUARTER, DUE TO BLOOD FLOW       Past Surgical History:   Procedure Laterality Date   • ANGIOPLASTY ILIAC ARTERY Right 2022    Procedure: RIGHT ILIAC  STENET PLACEMENT;  Surgeon: Dharmesh Collazo MD;  Location: [facility] OR ;  Service: Vascular;  Laterality: Right;   • ARTERIAL BYPASS SURGERY      RIGHT LEG   • CARDIAC CATHETERIZATION      showed total occlusion of the RCA, mild left main disease, and moderate disease of the circumflex.   • COLONOSCOPY     • CORONARY ANGIOPLASTY WITH STENT PLACEMENT      angioplasty and stent placement to the circumflex   • ENDOSCOPY     • ENDOSCOPY N/A 10/13/2021    Procedure: ESOPHAGOGASTRODUODENOSCOPY WITH BIOPSIES;  Surgeon: Gildardo Whelan MD;  Location: [facility] OR;  Service: Gastroenterology;  Laterality: N/A;  IRREGULAR Z LINE   • FEMORAL POPLITEAL BYPASS Right 3/12/2021    Procedure: RIGHT LEG BYPASS GRAFT REVISON;  Surgeon: Dharmesh Collazo MD;  Location: [facility];  Service: Vascular;  Laterality: Right;   • FEMORAL POPLITEAL BYPASS Right 2022    Procedure: RIGHT FEMORAL BELOW KNEE  POPLITEAL BYPASS;  Surgeon: Dharmesh Collazo MD;  Location: [facility];  Service: Vascular;  Laterality: Right;       Social History     Socioeconomic History   • Marital status:    Tobacco Use   • Smoking status: Former Smoker     Packs/day: 1.00     Years: 50.00     Pack years: 50.00     Types: Cigarettes     Quit date: 1/10/2022     Years since quittin.2   • Smokeless tobacco: Never Used   • Tobacco comment: caffeine use   Vaping Use   • Vaping Use: Never used   Substance and Sexual Activity   • Alcohol use: Yes     Alcohol/week: 0.0 standard drinks     Comment: Occasional social   • Drug use: No   • Sexual activity: Not Currently     Partners: Female       Family History   Problem Relation Age of Onset   • Heart attack Mother    • Cancer Mother         Passes in    • Heart disease Mother         Five heart attacks. Pacemaker   • Heart attack Father    • Heart disease Father         Passed from heart attack in    • Colon polyps Neg Hx    • Colon cancer Neg Hx    • Malig Hyperthermia Neg  Hx        Review of Systems   Constitutional: Negative for diaphoresis and malaise/fatigue.   Cardiovascular: Positive for leg swelling (right leg). Negative for chest pain, claudication, dyspnea on exertion, irregular heartbeat, near-syncope, orthopnea, palpitations, paroxysmal nocturnal dyspnea and syncope.   Respiratory: Negative for cough, shortness of breath and sleep disturbances due to breathing.    Musculoskeletal: Negative for falls.   Neurological: Negative for dizziness and weakness.   Psychiatric/Behavioral: Negative for altered mental status and substance abuse.       No Known Allergies      Current Outpatient Medications:   •  apixaban (ELIQUIS) 5 MG tablet tablet, Take 1 tablet by mouth Every 12 (Twelve) Hours. Indications: Other - full anticoagulation, Disp: 60 tablet, Rfl: 5  •  aspirin 325 MG tablet, Take 325 mg by mouth Daily. PT STATED TO CONTINUE PER MD, Disp: , Rfl:   •  atorvastatin (LIPITOR) 40 MG tablet, TAKE 1 TABLET EVERY DAY (Patient taking differently: Take 40 mg by mouth Every Night.), Disp: 90 tablet, Rfl: 1  •  cetirizine (ZyrTEC) 10 MG tablet, Take 10 mg by mouth Daily., Disp: , Rfl:   •  glipizide (GLUCOTROL) 5 MG tablet, Take 1 tablet by mouth Daily Before Supper., Disp: 90 tablet, Rfl: 1  •  lansoprazole (PREVACID) 15 MG capsule, Take 1 capsule by mouth Daily. (Patient taking differently: Take 15 mg by mouth Every Morning.), Disp: 90 capsule, Rfl: 1  •  lisinopril-hydrochlorothiazide (PRINZIDE,ZESTORETIC) 20-12.5 MG per tablet, TAKE 1 TABLET EVERY DAY (Patient taking differently: Take 1 tablet by mouth Daily.), Disp: 90 tablet, Rfl: 1  •  metFORMIN (GLUCOPHAGE) 500 MG tablet, TAKE 2 TABLETS TWICE DAILY WITH MEALS (Patient taking differently: Take 1,000 mg by mouth 2 (Two) Times a Day With Meals.), Disp: 360 tablet, Rfl: 1  •  metoprolol succinate XL (TOPROL-XL) 50 MG 24 hr tablet, Take 1 tablet by mouth Daily., Disp: 90 tablet, Rfl: 0  •  Misc Natural Products (TART CHERRY  "ADVANCED) capsule, Take 1 tablet by mouth Daily. HOLD PRIOR TO OR, Disp: , Rfl:   •  Multiple Vitamin (MULTIVITAMIN) capsule, Take 1 capsule by mouth Daily. HOLD PRIOR TO OR, Disp: , Rfl:   •  Omega-3 Fatty Acids (FISH OIL) 1200 MG capsule capsule, Take 1,200 mg by mouth Daily With Breakfast. HOLD PRIOR TO OR, Disp: , Rfl:       Objective:     Vitals:    04/15/22 1457   BP: 130/82   BP Location: Left arm   Patient Position: Sitting   Pulse: 80   SpO2: 96%   Weight: 68.1 kg (150 lb 3.2 oz)   Height: 172.7 cm (68\")     Body mass index is 22.84 kg/m².    PHYSICAL EXAM:    Constitutional:       General: Not in acute distress.     Appearance: Normal appearance. Well-developed.   Eyes:      Pupils: Pupils are equal, round, and reactive to light.   HENT:      Head: Normocephalic.   Neck:      Vascular: No carotid bruit or JVD.   Pulmonary:      Effort: Pulmonary effort is normal. No tachypnea.      Breath sounds: Normal breath sounds. No wheezing. No rales.   Cardiovascular:      Normal rate. Regular rhythm.      No gallop.   Pulses:     Intact distal pulses.   Edema:     Peripheral edema present.     Pretibial: 1+ edema of the right pretibial area.     Ankle: 1+ edema of the right ankle.  Abdominal:      General: Bowel sounds are normal.      Palpations: Abdomen is soft.      Tenderness: There is no abdominal tenderness.   Musculoskeletal: Normal range of motion.      Cervical back: Normal range of motion and neck supple. No edema. Skin:     General: Skin is warm and dry.   Neurological:      Mental Status: Alert and oriented to person, place, and time.           ECG 12 Lead    Date/Time: 4/15/2022 3:19 PM  Performed by: Briseida Sarabia APRN  Authorized by: Briseida Sarabia APRN   Comparison: compared with previous ECG from 2/15/2022  Similar to previous ECG  Rhythm: sinus rhythm  Ectopy: infrequent PVCs  Rate: normal  QRS axis: normal    Clinical impression: non-specific ECG              Assessment:       Diagnosis " Plan   1. Coronary artery disease involving native coronary artery of native heart without angina pectoris     2. Essential hypertension     3. Claudication ([facility])     4. Mixed hyperlipidemia     5. PVD (peripheral vascular disease) ([facility])     6. PAD (peripheral artery disease) ([facility])     7. Type 2 diabetes mellitus with diabetic neuropathy, without long-term current use of insulin ([facility])     8. Abnormal ankle brachial index ([facility])       No orders of the defined types were placed in this encounter.         Plan:       Not can make any changes today he is not having any cardiac symptoms.  His EKG is stable.  He is not having any angina symptoms and his blood pressure is within target range.  He can follow-up in a years time.         Your medication list          Accurate as of April 15, 2022  3:02 PM. If you have any questions, ask your nurse or doctor.            CHANGE how you take these medications      Instructions Last Dose Given Next Dose Due   atorvastatin 40 MG tablet  Commonly known as: LIPITOR  What changed: when to take this      TAKE 1 TABLET EVERY DAY       lansoprazole 15 MG capsule  Commonly known as: PREVACID  What changed: when to take this      Take 1 capsule by mouth Daily.          CONTINUE taking these medications      Instructions Last Dose Given Next Dose Due   aspirin 325 MG tablet      Take 325 mg by mouth Daily. PT STATED TO CONTINUE PER MD       cetirizine 10 MG tablet  Commonly known as: zyrTEC      Take 10 mg by mouth Daily.       Eliquis 5 MG tablet tablet  Generic drug: apixaban      Take 1 tablet by mouth Every 12 (Twelve) Hours. Indications: Other - full anticoagulation       fish oil 1200 MG capsule capsule      Take 1,200 mg by mouth Daily With Breakfast. HOLD PRIOR TO OR       glipizide 5 MG tablet  Commonly known as: GLUCOTROL      Take 1 tablet by mouth Daily Before Supper.       lisinopril-hydrochlorothiazide 20-12.5 MG per tablet  Commonly known as: PRINZIDE,ZESTORETIC      TAKE 1  TABLET EVERY DAY       metFORMIN 500 MG tablet  Commonly known as: GLUCOPHAGE      TAKE 2 TABLETS TWICE DAILY WITH MEALS       metoprolol succinate XL 50 MG 24 hr tablet  Commonly known as: TOPROL-XL      Take 1 tablet by mouth Daily.       multivitamin capsule      Take 1 capsule by mouth Daily. HOLD PRIOR TO OR       Tart [place] Advanced capsule      Take 1 tablet by mouth Daily. HOLD PRIOR TO OR                As always, it has been a pleasure to participate in your patient's care.      Sincerely,     Briseida SNIDER

## 2022-05-09 NOTE — TELEPHONE ENCOUNTER
Rx Refill Note  Requested Prescriptions     Pending Prescriptions Disp Refills   • metoprolol succinate XL (TOPROL-XL) 50 MG 24 hr tablet [Pharmacy Med Name: METOPROLOL SUCCINATE ER 50 MG Tablet Extended Release 24 Hour] 90 tablet 0     Sig: TAKE 1 TABLET EVERY DAY      Last office visit with prescribing clinician: 3/2/2022      Next office visit with prescribing clinician: 6/2/2022            Marie Pena MA  05/09/22, 11:22 EDT

## 2022-05-10 RX ORDER — METOPROLOL SUCCINATE 50 MG/1
TABLET, EXTENDED RELEASE ORAL
Qty: 90 TABLET | Refills: 0 | Status: SHIPPED | OUTPATIENT
Start: 2022-05-10 | End: 2022-10-03

## 2022-06-02 ENCOUNTER — OFFICE VISIT (OUTPATIENT)
Dept: FAMILY MEDICINE CLINIC | Facility: CLINIC | Age: 77
End: 2022-06-02

## 2022-06-02 VITALS
TEMPERATURE: 97.1 F | DIASTOLIC BLOOD PRESSURE: 87 MMHG | SYSTOLIC BLOOD PRESSURE: 160 MMHG | HEART RATE: 72 BPM | BODY MASS INDEX: 23.34 KG/M2 | WEIGHT: 154 LBS | OXYGEN SATURATION: 100 % | HEIGHT: 68 IN

## 2022-06-02 DIAGNOSIS — N39.43 DRIBBLING OF URINE: ICD-10-CM

## 2022-06-02 DIAGNOSIS — E11.40 TYPE 2 DIABETES MELLITUS WITH DIABETIC NEUROPATHY, WITHOUT LONG-TERM CURRENT USE OF INSULIN: Primary | ICD-10-CM

## 2022-06-02 DIAGNOSIS — E78.5 HYPERLIPIDEMIA ASSOCIATED WITH TYPE 2 DIABETES MELLITUS: ICD-10-CM

## 2022-06-02 DIAGNOSIS — I10 ESSENTIAL HYPERTENSION: ICD-10-CM

## 2022-06-02 DIAGNOSIS — E78.1 HIGH TRIGLYCERIDES: ICD-10-CM

## 2022-06-02 DIAGNOSIS — R39.12 WEAK URINE STREAM: ICD-10-CM

## 2022-06-02 DIAGNOSIS — E11.69 HYPERLIPIDEMIA ASSOCIATED WITH TYPE 2 DIABETES MELLITUS: ICD-10-CM

## 2022-06-02 DIAGNOSIS — E78.2 MIXED HYPERLIPIDEMIA: ICD-10-CM

## 2022-06-02 PROCEDURE — 99214 OFFICE O/P EST MOD 30 MIN: CPT | Performed by: NURSE PRACTITIONER

## 2022-06-02 RX ORDER — ATORVASTATIN CALCIUM 40 MG/1
40 TABLET, FILM COATED ORAL NIGHTLY
Qty: 90 TABLET | Refills: 1 | Status: SHIPPED | OUTPATIENT
Start: 2022-06-02 | End: 2023-01-09

## 2022-06-02 RX ORDER — TAMSULOSIN HYDROCHLORIDE 0.4 MG/1
1 CAPSULE ORAL DAILY
Qty: 30 CAPSULE | Refills: 1 | Status: SHIPPED | OUTPATIENT
Start: 2022-06-02 | End: 2022-06-17

## 2022-06-02 NOTE — PROGRESS NOTES
Chief Complaint  Labs Only (Pt here to get labs done) and prostate issues (Pt wants to f/u )    Subjective        Niraj Hassan presents to [facility]  Diabetes  He has type 2 diabetes mellitus. No MedicAlert identification noted. Pertinent negatives for hypoglycemia include no confusion, dizziness, headaches, hunger, mood changes, nervousness/anxiousness, pallor, seizures, sleepiness, speech difficulty, sweats or tremors. Associated symptoms include foot paresthesias and polyuria. Pertinent negatives for diabetes include no blurred vision, no chest pain, no fatigue, no foot ulcerations, no polydipsia, no polyphagia, no visual change, no weakness and no weight loss. Pertinent negatives for hypoglycemia complications include no blackouts, no hospitalization, no nocturnal hypoglycemia, no required assistance and no required glucagon injection. Diabetic complications include impotence and peripheral neuropathy. Pertinent negatives for diabetic complications include no CVA, heart disease, nephropathy, PVD or retinopathy. Current diabetic treatment includes oral agent (dual therapy). He is compliant with treatment most of the time. He is following a generally healthy diet. Meal planning includes avoidance of concentrated sweets. He has not had a previous visit with a dietitian. He participates in exercise intermittently. He does not see a podiatrist.Eye exam is current.          patient here today to to follow-up on type 2 diabetes, hyperlipidemia, hypertension.  He has been seen by vascular and seems to be stable with current medications.  He still has neuropathic symptoms but not nearly as bad as they were before.  He is on a blood thinner now and wondering why he is on a blood thinner and not Plavix as previous.  Neuropathy keeps him awake.  Most nights can sleep with CBD, melatonin, chamomile tea.      Type 2 diabetes: He tries to follow healthy diet and has not been as active  "recently but is trying to become more active.  He denies signs or symptoms of hypo or hyperglycemia.  Taking tolerating medications without side effects.    He has had worsening of urinary frequency, weakened stream, urgency.  Sometimes has trouble making it to the bathroom.  He has tried natural remedy but was not helpful.  Is ready to try medication if possible.    Thinks his blood pressure runs better at home than it is here today.  Is willing to monitor and let me know how it is running.    Still abstains from tobacco.    Objective   Vital Signs:  /87   Pulse 72   Temp 97.1 °F (36.2 °C)   Ht 172.7 cm (68\")   Wt 69.9 kg (154 lb)   SpO2 100%   BMI 23.42 kg/m²     BMI is within normal parameters. No other follow-up for BMI required.      Physical Exam  Vitals and nursing note reviewed.   Constitutional:       General: He is not in acute distress.     Appearance: He is well-developed. He is not ill-appearing or diaphoretic.   HENT:      Head: Normocephalic and atraumatic.   Eyes:      General:         Right eye: No discharge.         Left eye: No discharge.      Conjunctiva/sclera: Conjunctivae normal.   Cardiovascular:      Rate and Rhythm: Normal rate and regular rhythm.      Heart sounds: Normal heart sounds.   Pulmonary:      Effort: Pulmonary effort is normal.      Breath sounds: Normal breath sounds.   Abdominal:      General: Bowel sounds are normal.      Palpations: Abdomen is soft.      Tenderness: There is no abdominal tenderness.   Musculoskeletal:         General: No deformity.      Right lower leg: Edema (1+) present.      Comments: Well-healed right medial leg incisions, skin is warm and dry   Lymphadenopathy:      Cervical: No cervical adenopathy.   Skin:     General: Skin is warm and dry.      Comments: vitiligo   Neurological:      Mental Status: He is alert and oriented to person, place, and time.   Psychiatric:         Attention and Perception: Attention and perception normal.         " Mood and Affect: Mood and affect normal.         Speech: Speech normal.         Behavior: Behavior normal. Behavior is cooperative.         Thought Content: Thought content normal.         Cognition and Memory: Cognition normal.      Comments: Very pleasant, conversant, good medical historian        Result Review :                Assessment and Plan   Diagnoses and all orders for this visit:    1. Type 2 diabetes mellitus with diabetic neuropathy, without long-term current use of insulin (HCC) (Primary)  -     Hemoglobin A1c    2. Essential hypertension  -     Basic Metabolic Panel    3. Mixed hyperlipidemia  -     Lipid Panel With LDL / HDL Ratio    4. Dribbling of urine  -     PSA DIAGNOSTIC ONLY  -     Urinalysis With Culture If Indicated - Urine, Clean Catch  -     tamsulosin (FLOMAX) 0.4 MG capsule 24 hr capsule; Take 1 capsule by mouth Daily.  Dispense: 30 capsule; Refill: 1    5. Weak urine stream  -     PSA DIAGNOSTIC ONLY  -     Urinalysis With Culture If Indicated - Urine, Clean Catch  -     tamsulosin (FLOMAX) 0.4 MG capsule 24 hr capsule; Take 1 capsule by mouth Daily.  Dispense: 30 capsule; Refill: 1    Other orders  -     Microscopic Examination -    Type 2 diabetes: Has been pretty stable and less than 8 which is goal.  We will continue current medications, diet modifications and recommending increase in activity.  Will adjust medications based on A1c today.  Up-to-date on foot and eye care.    Hypertension: His blood pressure is elevated today which is abnormal for him.  We will have him monitor at home and let me know how it is running.  Need to make sure we keep it very well controlled due to vascular issues.    Hyperlipidemia: We will check lipids today.  Adjust statin based on results.    Will get a PSA today for urinary symptoms.  Likely caused by prostate enlargement.  Patient would like to start medication.  We will start Flomax.  Discussed side effects and avoidance of stopping cold turkey.   Will let me know if he has any problems.  We will get a urinalysis today also.    Discussed with patient that he failed Plavix and that is why he is on Eliquis.  Want to prevent at all costs closure of stents and right leg.               Follow Up   No follow-ups on file.  Patient was given instructions and counseling regarding his condition or for health maintenance advice. Please see specific information pulled into the AVS if appropriate.       Answers for HPI/ROS submitted by the patient on 5/26/2022  What is the primary reason for your visit?: Diabetes

## 2022-06-02 NOTE — TELEPHONE ENCOUNTER
Rx Refill Note  Requested Prescriptions     Pending Prescriptions Disp Refills   • atorvastatin (LIPITOR) 40 MG tablet [Pharmacy Med Name: ATORVASTATIN CALCIUM 40 MG Tablet] 90 tablet 1     Sig: TAKE 1 TABLET EVERY DAY      Last office visit with prescribing clinician: 3/2/2022      Next office visit with prescribing clinician: 6/2/2022       {TIP  Please add Last Relevant Lab Date if appropriate:3/2/2022    Cheyanne Aragon, PCT  06/02/22, 13:45 EDT

## 2022-06-03 LAB
APPEARANCE UR: CLEAR
BACTERIA #/AREA URNS HPF: NORMAL /[HPF]
BILIRUB UR QL STRIP: NEGATIVE
BUN SERPL-MCNC: 15 MG/DL (ref 8–27)
BUN/CREAT SERPL: 17 (ref 10–24)
CALCIUM SERPL-MCNC: 9.7 MG/DL (ref 8.6–10.2)
CASTS URNS QL MICRO: NORMAL /LPF
CHLORIDE SERPL-SCNC: 102 MMOL/L (ref 96–106)
CHOLEST SERPL-MCNC: 104 MG/DL (ref 100–199)
CO2 SERPL-SCNC: 23 MMOL/L (ref 20–29)
COLOR UR: YELLOW
CREAT SERPL-MCNC: 0.9 MG/DL (ref 0.76–1.27)
EGFRCR SERPLBLD CKD-EPI 2021: 89 ML/MIN/1.73
EPI CELLS #/AREA URNS HPF: NORMAL /HPF (ref 0–10)
GLUCOSE SERPL-MCNC: 127 MG/DL (ref 65–99)
GLUCOSE UR QL STRIP: NEGATIVE
HBA1C MFR BLD: 7.7 % (ref 4.8–5.6)
HDLC SERPL-MCNC: 38 MG/DL
HGB UR QL STRIP: NEGATIVE
KETONES UR QL STRIP: ABNORMAL
LDLC SERPL CALC-MCNC: 41 MG/DL (ref 0–99)
LDLC/HDLC SERPL: 1.1 RATIO (ref 0–3.6)
LEUKOCYTE ESTERASE UR QL STRIP: NEGATIVE
MICRO URNS: ABNORMAL
MICRO URNS: ABNORMAL
NITRITE UR QL STRIP: NEGATIVE
PH UR STRIP: 5.5 [PH] (ref 5–7.5)
POTASSIUM SERPL-SCNC: 4.9 MMOL/L (ref 3.5–5.2)
PROT UR QL STRIP: NEGATIVE
PSA SERPL-MCNC: 1.9 NG/ML (ref 0–4)
RBC #/AREA URNS HPF: NORMAL /HPF (ref 0–2)
SODIUM SERPL-SCNC: 140 MMOL/L (ref 134–144)
SP GR UR STRIP: 1.02 (ref 1–1.03)
TRIGL SERPL-MCNC: 146 MG/DL (ref 0–149)
URINALYSIS REFLEX: ABNORMAL
UROBILINOGEN UR STRIP-MCNC: 0.2 MG/DL (ref 0.2–1)
VLDLC SERPL CALC-MCNC: 25 MG/DL (ref 5–40)
WBC #/AREA URNS HPF: NORMAL /HPF (ref 0–5)

## 2022-06-08 DIAGNOSIS — I10 ESSENTIAL HYPERTENSION: ICD-10-CM

## 2022-06-08 RX ORDER — LISINOPRIL AND HYDROCHLOROTHIAZIDE 20; 12.5 MG/1; MG/1
1 TABLET ORAL DAILY
Qty: 90 TABLET | Refills: 1 | Status: SHIPPED | OUTPATIENT
Start: 2022-06-08 | End: 2023-01-16

## 2022-06-08 NOTE — TELEPHONE ENCOUNTER
Rx Refill Note  Requested Prescriptions     Pending Prescriptions Disp Refills   • lisinopril-hydrochlorothiazide (PRINZIDE,ZESTORETIC) 20-12.5 MG per tablet [Pharmacy Med Name: LISINOPRIL/HYDROCHLOROTHIAZIDE 20-12.5 MG Tablet] 90 tablet 1     Sig: TAKE 1 TABLET EVERY DAY      Last office visit with prescribing clinician: 6/2/2022      Next office visit with prescribing clinician: Visit date not found            Marie Pena MA  06/08/22, 16:05 EDT

## 2022-06-16 DIAGNOSIS — N39.43 DRIBBLING OF URINE: ICD-10-CM

## 2022-06-16 DIAGNOSIS — R39.12 WEAK URINE STREAM: ICD-10-CM

## 2022-06-16 NOTE — TELEPHONE ENCOUNTER
Rx Refill Note  Requested Prescriptions     Pending Prescriptions Disp Refills   • glipizide (GLUCOTROL) 5 MG tablet [Pharmacy Med Name: GLIPIZIDE 5 MG Tablet] 90 tablet 1     Sig: TAKE 1 TABLET BY MOUTH DAILY BEFORE SUPPER.      Last office visit with prescribing clinician: 6/2/2022      Next office visit with prescribing clinician: Visit date not found            Marie Pena MA  06/16/22, 16:02 EDT

## 2022-06-17 RX ORDER — GLIPIZIDE 5 MG/1
5 TABLET ORAL
Qty: 90 TABLET | Refills: 1 | Status: SHIPPED | OUTPATIENT
Start: 2022-06-17 | End: 2023-02-09

## 2022-06-17 RX ORDER — TAMSULOSIN HYDROCHLORIDE 0.4 MG/1
CAPSULE ORAL
Qty: 90 CAPSULE | Refills: 1 | Status: SHIPPED | OUTPATIENT
Start: 2022-06-17 | End: 2023-01-25

## 2022-06-27 NOTE — TELEPHONE ENCOUNTER
Rx Refill Note  Requested Prescriptions     Pending Prescriptions Disp Refills   • metFORMIN (GLUCOPHAGE) 500 MG tablet [Pharmacy Med Name: METFORMIN HYDROCHLORIDE 500 MG Tablet] 360 tablet 1     Sig: TAKE 2 TABLETS TWICE DAILY WITH MEALS      Last office visit with prescribing clinician: 6/2/2022      Next office visit with prescribing clinician: Visit date not found            Marie Pena MA  06/27/22, 15:10 EDT

## 2022-07-14 NOTE — TELEPHONE ENCOUNTER
Rx Refill Note  Requested Prescriptions     Pending Prescriptions Disp Refills   • clopidogrel (PLAVIX) 75 MG tablet [Pharmacy Med Name: CLOPIDOGREL 75 MG Tablet] 90 tablet 1     Sig: TAKE 1 TABLET EVERY DAY      Last office visit with prescribing clinician: 6/2/2022      Next office visit with prescribing clinician: Visit date not found            Marie Pena MA  07/14/22, 15:48 EDT

## 2022-07-15 RX ORDER — CLOPIDOGREL BISULFATE 75 MG/1
TABLET ORAL
Qty: 90 TABLET | Refills: 1 | OUTPATIENT
Start: 2022-07-15

## 2022-10-03 RX ORDER — METOPROLOL SUCCINATE 50 MG/1
TABLET, EXTENDED RELEASE ORAL
Qty: 90 TABLET | Refills: 0 | Status: SHIPPED | OUTPATIENT
Start: 2022-10-03 | End: 2023-02-28

## 2022-12-01 ENCOUNTER — TRANSCRIBE ORDERS (OUTPATIENT)
Dept: ADMINISTRATIVE | Facility: HOSPITAL | Age: 77
End: 2022-12-01

## 2022-12-01 DIAGNOSIS — I73.9 PAD (PERIPHERAL ARTERY DISEASE): Primary | ICD-10-CM

## 2022-12-28 ENCOUNTER — HOSPITAL ENCOUNTER (OUTPATIENT)
Dept: CT IMAGING | Facility: HOSPITAL | Age: 77
Discharge: HOME | End: 2022-12-28
Admitting: SURGERY
Payer: MEDICARE

## 2022-12-28 DIAGNOSIS — I73.9 PAD (PERIPHERAL ARTERY DISEASE): ICD-10-CM

## 2022-12-28 LAB — CREAT BLDA-MCNC: 1 MG/DL (ref 0.6–1.3)

## 2022-12-28 PROCEDURE — 0 IOPAMIDOL PER 1 ML: Performed by: SURGERY

## 2022-12-28 PROCEDURE — 75635 CT ANGIO ABDOMINAL ARTERIES: CPT

## 2022-12-28 PROCEDURE — 82565 ASSAY OF CREATININE: CPT

## 2022-12-28 RX ADMIN — IOPAMIDOL 95 ML: 755 INJECTION, SOLUTION INTRAVENOUS at 10:02

## 2023-01-09 DIAGNOSIS — E78.5 HYPERLIPIDEMIA ASSOCIATED WITH TYPE 2 DIABETES MELLITUS: ICD-10-CM

## 2023-01-09 DIAGNOSIS — E11.69 HYPERLIPIDEMIA ASSOCIATED WITH TYPE 2 DIABETES MELLITUS: ICD-10-CM

## 2023-01-09 DIAGNOSIS — E78.1 HIGH TRIGLYCERIDES: ICD-10-CM

## 2023-01-09 RX ORDER — ATORVASTATIN CALCIUM 40 MG/1
TABLET, FILM COATED ORAL
Qty: 90 TABLET | Refills: 1 | Status: SHIPPED | OUTPATIENT
Start: 2023-01-09

## 2023-01-09 NOTE — TELEPHONE ENCOUNTER
Rx Refill Note  Requested Prescriptions     Pending Prescriptions Disp Refills   • atorvastatin (LIPITOR) 40 MG tablet [Pharmacy Med Name: ATORVASTATIN CALCIUM 40 MG Tablet] 90 tablet 1     Sig: TAKE 1 TABLET EVERY NIGHT      Last office visit with prescribing clinician: 6/2/2022   Last telemedicine visit with prescribing clinician: Visit date not found   Next office visit with prescribing clinician: Visit date not found                         Would you like a call back once the refill request has been completed: [] Yes [] No    If the office needs to give you a call back, can they leave a voicemail: [] Yes [] No    Marie Pena MA  01/09/23, 13:40 EST

## 2023-01-16 DIAGNOSIS — I10 ESSENTIAL HYPERTENSION: ICD-10-CM

## 2023-01-16 RX ORDER — LISINOPRIL AND HYDROCHLOROTHIAZIDE 20; 12.5 MG/1; MG/1
TABLET ORAL
Qty: 90 TABLET | Refills: 1 | Status: SHIPPED | OUTPATIENT
Start: 2023-01-16 | End: 2023-01-18

## 2023-01-16 NOTE — TELEPHONE ENCOUNTER
Rx Refill Note  Requested Prescriptions     Pending Prescriptions Disp Refills   • lisinopril-hydrochlorothiazide (PRINZIDE,ZESTORETIC) 20-12.5 MG per tablet [Pharmacy Med Name: LISINOPRIL/HYDROCHLOROTHIAZIDE 20-12.5 MG Tablet] 90 tablet 1     Sig: TAKE 1 TABLET EVERY DAY      Last office visit with prescribing clinician: 6/2/2022   Last telemedicine visit with prescribing clinician: Visit date not found   Next office visit with prescribing clinician: Visit date not found                         Would you like a call back once the refill request has been completed: [] Yes [] No    If the office needs to give you a call back, can they leave a voicemail: [] Yes [] No    Saray Zamora LPN  01/16/23, 07:54 EST

## 2023-01-18 ENCOUNTER — OFFICE VISIT (OUTPATIENT)
Dept: FAMILY MEDICINE CLINIC | Facility: CLINIC | Age: 78
End: 2023-01-18
Payer: MEDICARE

## 2023-01-18 VITALS
BODY MASS INDEX: 23.64 KG/M2 | HEIGHT: 68 IN | DIASTOLIC BLOOD PRESSURE: 85 MMHG | WEIGHT: 156 LBS | HEART RATE: 72 BPM | TEMPERATURE: 97.5 F | SYSTOLIC BLOOD PRESSURE: 181 MMHG | OXYGEN SATURATION: 96 %

## 2023-01-18 DIAGNOSIS — I73.9 PVD (PERIPHERAL VASCULAR DISEASE): ICD-10-CM

## 2023-01-18 DIAGNOSIS — E11.9 TYPE 2 DIABETES MELLITUS WITHOUT COMPLICATION, WITHOUT LONG-TERM CURRENT USE OF INSULIN: Primary | ICD-10-CM

## 2023-01-18 DIAGNOSIS — E11.69 HYPERLIPIDEMIA ASSOCIATED WITH TYPE 2 DIABETES MELLITUS: ICD-10-CM

## 2023-01-18 DIAGNOSIS — E78.5 HYPERLIPIDEMIA ASSOCIATED WITH TYPE 2 DIABETES MELLITUS: ICD-10-CM

## 2023-01-18 DIAGNOSIS — I10 ESSENTIAL HYPERTENSION: ICD-10-CM

## 2023-01-18 PROBLEM — H04.123 DRY EYES: Status: ACTIVE | Noted: 2021-07-07

## 2023-01-18 PROBLEM — H25.9 AGE-RELATED CATARACT OF BOTH EYES: Status: ACTIVE | Noted: 2017-04-03

## 2023-01-18 PROCEDURE — 99214 OFFICE O/P EST MOD 30 MIN: CPT | Performed by: NURSE PRACTITIONER

## 2023-01-18 RX ORDER — OLMESARTAN MEDOXOMIL AND HYDROCHLOROTHIAZIDE 20/12.5 20; 12.5 MG/1; MG/1
1 TABLET ORAL DAILY
Qty: 30 TABLET | Refills: 1 | Status: SHIPPED | OUTPATIENT
Start: 2023-01-18 | End: 2023-02-20

## 2023-01-18 NOTE — PROGRESS NOTES
"Chief Complaint  Diabetes (F/u dm )    Subjective        Niraj Hassan presents to [facility]  History of Present Illness   Patient reports diabetes has been doing well since last visit.  He has been taking and tolerating glipizide and metformin as directed.  He denies any signs or symptoms of hypo or hyperglycemia.  Tries to eat overall healthier diet and is active when able.    He has been to vascular recently and reports has lost 40% blood flow in right leg at last visit and are monitoring for now, using medication management.  Given previous vascular graft for blood flow he is not a good candidate for surgical revascularization.  Leg currently is not painful.    He has some chronic low back pain which is stable and intermittent.  No changes.  He does admit that he sometimes has a sensation of buzzing or tingling around his rectum that comes and goes but has not worsened and not recently changed.  He has not had saddle anesthesia or changes to bowel or bladder.  He denies current pain or rectal sensation.    Blood pressure is elevated today.  He has not been checking it at home but thinks it has been good at other visits as vascular has not reported any problems to him with his blood pressure.  He is taking and tolerating blood pressure medications without side effects.  He also takes multiple supplements which he will update in his med list.    Remains tobacco free currently.        Objective   Vital Signs:  BP (!) 181/85   Pulse 72   Temp 97.5 °F (36.4 °C)   Ht 172.7 cm (68\")   Wt 70.8 kg (156 lb)   SpO2 96%   BMI 23.72 kg/m²   Estimated body mass index is 23.72 kg/m² as calculated from the following:    Height as of this encounter: 172.7 cm (68\").    Weight as of this encounter: 70.8 kg (156 lb).       BMI is within normal parameters. No other follow-up for BMI required.      Physical Exam  Vitals and nursing note reviewed.   Constitutional:       General: He is not in " acute distress.     Appearance: He is well-developed. He is not ill-appearing or diaphoretic.   HENT:      Head: Normocephalic and atraumatic.      Right Ear: Tympanic membrane, ear canal and external ear normal.      Left Ear: Tympanic membrane, ear canal and external ear normal.      Mouth/Throat:      Mouth: Mucous membranes are moist.      Pharynx: No posterior oropharyngeal erythema.   Eyes:      General:         Right eye: No discharge.         Left eye: No discharge.      Conjunctiva/sclera: Conjunctivae normal.   Cardiovascular:      Rate and Rhythm: Normal rate and regular rhythm.   Pulmonary:      Effort: Pulmonary effort is normal.      Breath sounds: Normal breath sounds. No wheezing or rales.   Abdominal:      General: Bowel sounds are normal.      Palpations: Abdomen is soft.      Tenderness: There is no abdominal tenderness.   Musculoskeletal:         General: No deformity.      Cervical back: Neck supple.      Right lower leg: No edema.      Left lower leg: No edema.      Comments: Gait smooth and steady  Right leg cool and dry, no skin break down   Lymphadenopathy:      Cervical: No cervical adenopathy.   Skin:     General: Skin is warm and dry.   Neurological:      Mental Status: He is alert and oriented to person, place, and time.   Psychiatric:         Mood and Affect: Mood normal.         Behavior: Behavior normal.         Thought Content: Thought content normal.        Result Review :                   Assessment and Plan   Diagnoses and all orders for this visit:    1. Type 2 diabetes mellitus without complication, without long-term current use of insulin (HCC) (Primary)  -     Hemoglobin A1c    2. Essential hypertension  -     olmesartan-hydrochlorothiazide (BENICAR HCT) 20-12.5 MG per tablet; Take 1 tablet by mouth Daily.  Dispense: 30 tablet; Refill: 1  -     CBC & Differential  -     Comprehensive Metabolic Panel    3. Hyperlipidemia associated with type 2 diabetes mellitus (HCC)  -      Lipid Panel With LDL / HDL Ratio    4. PVD (peripheral vascular disease) (HCC)      Blood pressure is very elevated today which we discussed.  I do not see other blood pressures documented.  With his history we need to maintain excellent blood pressure.  I am switching him from losartan HCTZ to olmesartan HCTZ.  Recommend monitoring of blood pressure at home.  May need to modify this.  Continue beta-blocker.    Will get A1c today.  Has been pretty well controlled with current p.o. medications.  Diet and activity discussed.    Will check lipids.  On a statin.    Peripheral vascular disease seems to be overall stable.  Reviewed recent CT angio abdomen and pelvis.  He has a stable 3.5 cm infrarenal abdominal aneurysm.  He has a right external iliac artery stent that has been patent.  He has noted chronic DDD and L-spine, organs of abdomen and pelvis appear unremarkable.  Does have some colonic diverticulosis with no diverticulitis.  His CTA of right lower extremity showed patent Frias pop bypass graft.  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  He has flow per collaterals.  Left CTA shows extensive atherosclerosis of superficial femoral artery with multifocal severe stenosis which is not changed from previous.  Remains on Eliquis and ASA.    When him back in 1 month for AWV and recheck on blood pressure.    We discussed signs and symptoms that require emergent evaluation.         Follow Up   Return in about 1 month (around 2/18/2023) for Recheck, Medicare Wellness.  Patient was given instructions and counseling regarding his condition or for health maintenance advice. Please see specific information pulled into the AVS if appropriate.       Answers for HPI/ROS submitted by the patient on 1/16/2023  What is the primary reason for your visit?: Other  Please describe your symptoms.: Just a check up  Have you had these symptoms before?: No  How long have you been having these symptoms?: Greater than 2  weeks

## 2023-01-19 LAB
ALBUMIN SERPL-MCNC: 4.6 G/DL (ref 3.5–5.2)
ALBUMIN/GLOB SERPL: 2.3 G/DL
ALP SERPL-CCNC: 100 U/L (ref 39–117)
ALT SERPL-CCNC: 17 U/L (ref 1–41)
AST SERPL-CCNC: 19 U/L (ref 1–40)
BASOPHILS # BLD AUTO: 0.04 10*3/MM3 (ref 0–0.2)
BASOPHILS NFR BLD AUTO: 0.6 % (ref 0–1.5)
BILIRUB SERPL-MCNC: 0.2 MG/DL (ref 0–1.2)
BUN SERPL-MCNC: 12 MG/DL (ref 8–23)
BUN/CREAT SERPL: 13.6 (ref 7–25)
CALCIUM SERPL-MCNC: 9.8 MG/DL (ref 8.6–10.5)
CHLORIDE SERPL-SCNC: 104 MMOL/L (ref 98–107)
CHOLEST SERPL-MCNC: 121 MG/DL (ref 0–200)
CO2 SERPL-SCNC: 23.7 MMOL/L (ref 22–29)
CREAT SERPL-MCNC: 0.88 MG/DL (ref 0.76–1.27)
EGFRCR SERPLBLD CKD-EPI 2021: 89.1 ML/MIN/1.73
EOSINOPHIL # BLD AUTO: 0.18 10*3/MM3 (ref 0–0.4)
EOSINOPHIL NFR BLD AUTO: 2.5 % (ref 0.3–6.2)
ERYTHROCYTE [DISTWIDTH] IN BLOOD BY AUTOMATED COUNT: 13.8 % (ref 12.3–15.4)
GLOBULIN SER CALC-MCNC: 2 GM/DL
GLUCOSE SERPL-MCNC: 186 MG/DL (ref 65–99)
HBA1C MFR BLD: 7.5 % (ref 4.8–5.6)
HCT VFR BLD AUTO: 32.9 % (ref 37.5–51)
HDLC SERPL-MCNC: 33 MG/DL (ref 40–60)
HGB BLD-MCNC: 10.6 G/DL (ref 13–17.7)
IMM GRANULOCYTES # BLD AUTO: 0.01 10*3/MM3 (ref 0–0.05)
IMM GRANULOCYTES NFR BLD AUTO: 0.1 % (ref 0–0.5)
LDLC SERPL CALC-MCNC: 56 MG/DL (ref 0–100)
LDLC/HDLC SERPL: 1.47 {RATIO}
LYMPHOCYTES # BLD AUTO: 1.91 10*3/MM3 (ref 0.7–3.1)
LYMPHOCYTES NFR BLD AUTO: 26.7 % (ref 19.6–45.3)
MCH RBC QN AUTO: 28.2 PG (ref 26.6–33)
MCHC RBC AUTO-ENTMCNC: 32.2 G/DL (ref 31.5–35.7)
MCV RBC AUTO: 87.5 FL (ref 79–97)
MONOCYTES # BLD AUTO: 0.61 10*3/MM3 (ref 0.1–0.9)
MONOCYTES NFR BLD AUTO: 8.5 % (ref 5–12)
NEUTROPHILS # BLD AUTO: 4.41 10*3/MM3 (ref 1.7–7)
NEUTROPHILS NFR BLD AUTO: 61.6 % (ref 42.7–76)
NRBC BLD AUTO-RTO: 0 /100 WBC (ref 0–0.2)
PLATELET # BLD AUTO: 230 10*3/MM3 (ref 140–450)
POTASSIUM SERPL-SCNC: 4.9 MMOL/L (ref 3.5–5.2)
PROT SERPL-MCNC: 6.6 G/DL (ref 6–8.5)
RBC # BLD AUTO: 3.76 10*6/MM3 (ref 4.14–5.8)
SODIUM SERPL-SCNC: 141 MMOL/L (ref 136–145)
TRIGL SERPL-MCNC: 197 MG/DL (ref 0–150)
VLDLC SERPL CALC-MCNC: 32 MG/DL (ref 5–40)
WBC # BLD AUTO: 7.16 10*3/MM3 (ref 3.4–10.8)

## 2023-01-25 DIAGNOSIS — R39.12 WEAK URINE STREAM: ICD-10-CM

## 2023-01-25 DIAGNOSIS — N39.43 DRIBBLING OF URINE: ICD-10-CM

## 2023-01-25 RX ORDER — TAMSULOSIN HYDROCHLORIDE 0.4 MG/1
CAPSULE ORAL
Qty: 90 CAPSULE | Refills: 1 | Status: SHIPPED | OUTPATIENT
Start: 2023-01-25

## 2023-01-25 NOTE — TELEPHONE ENCOUNTER
Rx Refill Note  Requested Prescriptions     Pending Prescriptions Disp Refills   • tamsulosin (FLOMAX) 0.4 MG capsule 24 hr capsule [Pharmacy Med Name: TAMSULOSIN HYDROCHLORIDE 0.4 MG Capsule] 90 capsule 1     Sig: TAKE 1 CAPSULE EVERY DAY      Last office visit with prescribing clinician: 1/18/2023   Last telemedicine visit with prescribing clinician: 2/21/2023   Next office visit with prescribing clinician: 2/21/2023                         Would you like a call back once the refill request has been completed: [] Yes [] No    If the office needs to give you a call back, can they leave a voicemail: [] Yes [] No    Saray Zamora LPN  01/25/23, 09:01 EST

## 2023-02-02 NOTE — TELEPHONE ENCOUNTER
Rx Refill Note  Requested Prescriptions     Pending Prescriptions Disp Refills   • metFORMIN (GLUCOPHAGE) 500 MG tablet [Pharmacy Med Name: METFORMIN HYDROCHLORIDE 500 MG Tablet] 360 tablet 1     Sig: TAKE 2 TABLETS TWICE DAILY WITH MEALS      Last office visit with prescribing clinician: 1/18/2023   Last telemedicine visit with prescribing clinician: 2/21/2023   Next office visit with prescribing clinician: 2/21/2023                         Would you like a call back once the refill request has been completed: [] Yes [] No    If the office needs to give you a call back, can they leave a voicemail: [] Yes [] No    Marie Pena MA  02/02/23, 12:28 EST

## 2023-02-09 RX ORDER — GLIPIZIDE 5 MG/1
TABLET ORAL
Qty: 90 TABLET | Refills: 1 | Status: SHIPPED | OUTPATIENT
Start: 2023-02-09

## 2023-02-09 NOTE — TELEPHONE ENCOUNTER
Rx Refill Note  Requested Prescriptions     Pending Prescriptions Disp Refills   • glipizide (GLUCOTROL) 5 MG tablet [Pharmacy Med Name: GLIPIZIDE 5 MG Tablet] 90 tablet 1     Sig: TAKE 1 TABLET EVERY DAY BEFORE SUPPER      Last office visit with prescribing clinician: 1/18/2023   Last telemedicine visit with prescribing clinician: 2/21/2023   Next office visit with prescribing clinician: 2/21/2023       {TIP  Please add Last Relevant Lab Date if appropriate: 01/18/23                 Would you like a call back once the refill request has been completed: [] Yes [] No    If the office needs to give you a call back, can they leave a voicemail: [] Yes [] No    Armida De La Garza MA  02/09/23, 15:55 EST

## 2023-02-20 DIAGNOSIS — I10 ESSENTIAL HYPERTENSION: ICD-10-CM

## 2023-02-20 RX ORDER — OLMESARTAN MEDOXOMIL AND HYDROCHLOROTHIAZIDE 20/12.5 20; 12.5 MG/1; MG/1
TABLET ORAL
Qty: 90 TABLET | Refills: 0 | Status: SHIPPED | OUTPATIENT
Start: 2023-02-20 | End: 2023-02-21 | Stop reason: DRUGHIGH

## 2023-02-21 ENCOUNTER — OFFICE VISIT (OUTPATIENT)
Dept: FAMILY MEDICINE CLINIC | Facility: CLINIC | Age: 78
End: 2023-02-21
Payer: MEDICARE

## 2023-02-21 VITALS
OXYGEN SATURATION: 100 % | BODY MASS INDEX: 24.1 KG/M2 | HEART RATE: 61 BPM | SYSTOLIC BLOOD PRESSURE: 164 MMHG | TEMPERATURE: 96.9 F | WEIGHT: 159 LBS | HEIGHT: 68 IN | DIASTOLIC BLOOD PRESSURE: 88 MMHG

## 2023-02-21 DIAGNOSIS — I73.9 PVD (PERIPHERAL VASCULAR DISEASE): ICD-10-CM

## 2023-02-21 DIAGNOSIS — I10 ESSENTIAL HYPERTENSION: Primary | ICD-10-CM

## 2023-02-21 DIAGNOSIS — D50.8 OTHER IRON DEFICIENCY ANEMIA: ICD-10-CM

## 2023-02-21 DIAGNOSIS — E11.9 TYPE 2 DIABETES MELLITUS WITHOUT COMPLICATION, WITHOUT LONG-TERM CURRENT USE OF INSULIN: ICD-10-CM

## 2023-02-21 PROCEDURE — 99214 OFFICE O/P EST MOD 30 MIN: CPT | Performed by: NURSE PRACTITIONER

## 2023-02-21 RX ORDER — OLMESARTAN MEDOXOMIL AND HYDROCHLOROTHIAZIDE 40/25 40; 25 MG/1; MG/1
1 TABLET ORAL DAILY
Qty: 90 TABLET | Refills: 0 | Status: SHIPPED | OUTPATIENT
Start: 2023-02-21 | End: 2023-03-31 | Stop reason: SDUPTHER

## 2023-02-28 RX ORDER — METOPROLOL SUCCINATE 50 MG/1
TABLET, EXTENDED RELEASE ORAL
Qty: 90 TABLET | Refills: 0 | Status: SHIPPED | OUTPATIENT
Start: 2023-02-28

## 2023-02-28 NOTE — TELEPHONE ENCOUNTER
Rx Refill Note  Requested Prescriptions     Pending Prescriptions Disp Refills   • metoprolol succinate XL (TOPROL-XL) 50 MG 24 hr tablet [Pharmacy Med Name: METOPROLOL SUCCINATE ER 50 MG Tablet Extended Release 24 Hour] 90 tablet 0     Sig: TAKE 1 TABLET EVERY DAY      Last office visit with prescribing clinician: 2/21/2023   Last telemedicine visit with prescribing clinician: Visit date not found   Next office visit with prescribing clinician: Visit date not found                         Would you like a call back once the refill request has been completed: [] Yes [] No    If the office needs to give you a call back, can they leave a voicemail: [] Yes [] No    Marie Pena MA  02/28/23, 16:55 EST

## 2023-03-15 DIAGNOSIS — I10 ESSENTIAL HYPERTENSION: ICD-10-CM

## 2023-03-15 DIAGNOSIS — I10 ESSENTIAL HYPERTENSION: Primary | ICD-10-CM

## 2023-03-15 RX ORDER — AMLODIPINE BESYLATE 5 MG/1
5 TABLET ORAL DAILY
Qty: 30 TABLET | Refills: 0 | Status: SHIPPED | OUTPATIENT
Start: 2023-03-15

## 2023-03-20 RX ORDER — OLMESARTAN MEDOXOMIL AND HYDROCHLOROTHIAZIDE 20/12.5 20; 12.5 MG/1; MG/1
TABLET ORAL
Qty: 30 TABLET | Refills: 1 | OUTPATIENT
Start: 2023-03-20

## 2023-03-31 ENCOUNTER — OFFICE VISIT (OUTPATIENT)
Dept: FAMILY MEDICINE CLINIC | Facility: CLINIC | Age: 78
End: 2023-03-31
Payer: MEDICARE

## 2023-03-31 VITALS
OXYGEN SATURATION: 96 % | SYSTOLIC BLOOD PRESSURE: 122 MMHG | WEIGHT: 163 LBS | HEIGHT: 68 IN | RESPIRATION RATE: 18 BRPM | TEMPERATURE: 97.3 F | DIASTOLIC BLOOD PRESSURE: 72 MMHG | BODY MASS INDEX: 24.71 KG/M2

## 2023-03-31 DIAGNOSIS — I10 ESSENTIAL HYPERTENSION: Primary | Chronic | ICD-10-CM

## 2023-03-31 DIAGNOSIS — E11.9 TYPE 2 DIABETES MELLITUS WITHOUT COMPLICATION, WITHOUT LONG-TERM CURRENT USE OF INSULIN: Chronic | ICD-10-CM

## 2023-03-31 RX ORDER — OLMESARTAN MEDOXOMIL AND HYDROCHLOROTHIAZIDE 40/25 40; 25 MG/1; MG/1
1 TABLET ORAL DAILY
Qty: 90 TABLET | Refills: 1 | Status: SHIPPED | OUTPATIENT
Start: 2023-03-31

## 2023-03-31 NOTE — PROGRESS NOTES
"Chief Complaint  Hypertension (Pt here for f/u of BP)    Subjective        Niraj Hassan presents to [facility]  History of Present Illness   No side effects on olmesartan-HCTZ.  BP at home has been up and down. He is well feeling.  No chest pain, palpitations, dizziness, HA, weakness.  No change to activity tolerance    T2DM:  Taking and tolerating meds without side effects.  Trying to be more active and eat better.  Cooking more at home.  Denies s/s hypo/hyperglycemia.     Objective   Vital Signs:  /72   Temp 97.3 °F (36.3 °C)   Resp 18   Ht 172.7 cm (68\")   Wt 73.9 kg (163 lb)   SpO2 96%   BMI 24.78 kg/m²   Estimated body mass index is 24.78 kg/m² as calculated from the following:    Height as of this encounter: 172.7 cm (68\").    Weight as of this encounter: 73.9 kg (163 lb).       BMI is within normal parameters. No other follow-up for BMI required.      Physical Exam  Vitals and nursing note reviewed.   Constitutional:       General: He is not in acute distress.     Appearance: He is well-developed. He is not ill-appearing or diaphoretic.   HENT:      Head: Normocephalic and atraumatic.   Eyes:      General:         Right eye: No discharge.         Left eye: No discharge.      Conjunctiva/sclera: Conjunctivae normal.   Cardiovascular:      Rate and Rhythm: Normal rate and regular rhythm.   Pulmonary:      Effort: Pulmonary effort is normal.      Breath sounds: Normal breath sounds.   Abdominal:      General: Bowel sounds are normal.      Palpations: Abdomen is soft.      Tenderness: There is no abdominal tenderness.   Musculoskeletal:         General: No deformity.      Comments: Gait smooth and steady   Skin:     General: Skin is warm and dry.   Neurological:      Mental Status: He is alert and oriented to person, place, and time.   Psychiatric:         Mood and Affect: Mood normal.         Behavior: Behavior normal.        Result Review :                 "   Assessment and Plan   Diagnoses and all orders for this visit:    1. Essential hypertension (Primary)  -     Basic Metabolic Panel  -     olmesartan-hydrochlorothiazide (BENICAR HCT) 40-25 MG per tablet; Take 1 tablet by mouth Daily.  Dispense: 90 tablet; Refill: 1    2. Type 2 diabetes mellitus without complication, without long-term current use of insulin (HCC)  -     Hemoglobin A1c    BP is excellent today-will continue current meds.Check BMP. Refill sent.  Monitor BP at home.  Recommend bringing cuff to next visit to make sure it is reading correctly.     T2DM:  Last A1C was improved. Will recheck today, adjust meds based on A1C.            Follow Up   Return in about 6 months (around 9/30/2023) for Medicare Wellness.  Patient was given instructions and counseling regarding his condition or for health maintenance advice. Please see specific information pulled into the AVS if appropriate.       Answers for HPI/ROS submitted by the patient on 3/25/2023  What is the primary reason for your visit?: High Blood Pressure

## 2023-04-01 LAB
BUN SERPL-MCNC: 12 MG/DL (ref 8–23)
BUN/CREAT SERPL: 13.6 (ref 7–25)
CALCIUM SERPL-MCNC: 9.6 MG/DL (ref 8.6–10.5)
CHLORIDE SERPL-SCNC: 103 MMOL/L (ref 98–107)
CO2 SERPL-SCNC: 22.9 MMOL/L (ref 22–29)
CREAT SERPL-MCNC: 0.88 MG/DL (ref 0.76–1.27)
EGFRCR SERPLBLD CKD-EPI 2021: 89.1 ML/MIN/1.73
GLUCOSE SERPL-MCNC: 196 MG/DL (ref 65–99)
HBA1C MFR BLD: 7.4 % (ref 4.8–5.6)
POTASSIUM SERPL-SCNC: 4.8 MMOL/L (ref 3.5–5.2)
SODIUM SERPL-SCNC: 141 MMOL/L (ref 136–145)

## 2023-04-11 DIAGNOSIS — I10 ESSENTIAL HYPERTENSION: ICD-10-CM

## 2023-04-12 RX ORDER — AMLODIPINE BESYLATE 5 MG/1
TABLET ORAL
Qty: 90 TABLET | Refills: 0 | Status: SHIPPED | OUTPATIENT
Start: 2023-04-12

## 2023-04-26 DIAGNOSIS — I10 ESSENTIAL HYPERTENSION: ICD-10-CM

## 2023-04-26 NOTE — TELEPHONE ENCOUNTER
PHARMACY CALLED FOR MEDICATION REFILL OF  amLODIPine (NORVASC) 5 MG tablet    [facility] - [place], [place] - [address] [facility] [phone]  - [phone]   [phone]

## 2023-04-27 ENCOUNTER — OFFICE VISIT (OUTPATIENT)
Dept: CARDIOLOGY | Facility: CLINIC | Age: 78
End: 2023-04-27
Payer: MEDICARE

## 2023-04-27 DIAGNOSIS — I73.9 PVD (PERIPHERAL VASCULAR DISEASE): ICD-10-CM

## 2023-04-27 DIAGNOSIS — I73.9 PAD (PERIPHERAL ARTERY DISEASE): ICD-10-CM

## 2023-04-27 DIAGNOSIS — I10 ESSENTIAL HYPERTENSION: ICD-10-CM

## 2023-04-27 DIAGNOSIS — E78.2 MIXED HYPERLIPIDEMIA: ICD-10-CM

## 2023-04-27 DIAGNOSIS — I25.10 CORONARY ARTERY DISEASE INVOLVING NATIVE CORONARY ARTERY OF NATIVE HEART WITHOUT ANGINA PECTORIS: Primary | ICD-10-CM

## 2023-04-27 PROCEDURE — 99214 OFFICE O/P EST MOD 30 MIN: CPT | Performed by: INTERNAL MEDICINE

## 2023-04-27 PROCEDURE — 93000 ELECTROCARDIOGRAM COMPLETE: CPT | Performed by: INTERNAL MEDICINE

## 2023-04-27 RX ORDER — AMLODIPINE BESYLATE 5 MG/1
5 TABLET ORAL DAILY
Qty: 90 TABLET | Refills: 1 | Status: SHIPPED | OUTPATIENT
Start: 2023-04-27

## 2023-04-27 NOTE — PROGRESS NOTES
Date of Office Visit:  23    Encounter Provider: Shamir Manriquez MD  Place of Service: [facility] CARDIOLOGY  Patient Name: Niraj Hassan  :1945    Chief complaint  Coronary artery disease  PAD with prior Fem-pop bypass right lower extremity    HPI:  77 y.o. male with a history of coronary disease, peripheral arterial disease, and ischemic cardiomyopathy. He had an angioplasty to the circumflex prior to  but had a repeat cath at that time that showed mild disease of the circumflex and  of the RCA with left-to-right collaterals.  He was noted to have a circumflex distribution MI but his circumflex following that and had occluded his circumflex.  He was treated medically. He has also had a femoro-popliteal bypass to the right lower extremity.  He is still getting some swelling in that leg.  Overall has been doing very well.  He denies any chest pain or dyspnea.  His blood pressure is better controlled than it has been in a least a year.  He has no orthopnea or PND.  He denies bleeding complications on the Eliquis therapy.    Previous testing and notes have been reviewed by me.   Past Medical History:   Diagnosis Date   • AAA (abdominal aortic aneurysm)    • Allergic     Had for years. Mold is the main.   • Aneurysm    • Abreu esophagus    • CAD (coronary artery disease)    • DDD (degenerative disc disease), lumbar 2021   • Diabetes mellitus    • Enlarged prostate    • GERD (gastroesophageal reflux disease)    • Hyperlipidemia    • Hypertension    • Lumbar neuritis 2021   • Myocardial infarction ,    • Neuropathy     RT FOOT   • Osteoarthritis    • PVD (peripheral vascular disease)    • Sciatic nerve pain    • Wound of right leg     STATED SIZE OF QUARTER, DUE TO BLOOD FLOW       Past Surgical History:   Procedure Laterality Date   • ANGIOPLASTY ILIAC ARTERY Right 2022    Procedure: RIGHT ILIAC STENET PLACEMENT;  Surgeon: Zay  Dharmesh PARISI MD;  Location: [facility] OR ;  Service: Vascular;  Laterality: Right;   • ARTERIAL BYPASS SURGERY      RIGHT LEG   • CARDIAC CATHETERIZATION      showed total occlusion of the RCA, mild left main disease, and moderate disease of the circumflex.   • COLONOSCOPY     • CORONARY ANGIOPLASTY WITH STENT PLACEMENT      angioplasty and stent placement to the circumflex   • ENDOSCOPY     • ENDOSCOPY N/A 10/13/2021    Procedure: ESOPHAGOGASTRODUODENOSCOPY WITH BIOPSIES;  Surgeon: Gildardo Whelan MD;  Location: [facility] OR;  Service: Gastroenterology;  Laterality: N/A;  IRREGULAR Z LINE   • FEMORAL POPLITEAL BYPASS Right 3/12/2021    Procedure: RIGHT LEG BYPASS GRAFT REVISON;  Surgeon: Dharmesh Collazo MD;  Location: [facility] OR;  Service: Vascular;  Laterality: Right;   • FEMORAL POPLITEAL BYPASS Right 2022    Procedure: RIGHT FEMORAL BELOW KNEE  POPLITEAL BYPASS;  Surgeon: Dharmesh Collazo MD;  Location: [facility] OR;  Service: Vascular;  Laterality: Right;       Social History     Socioeconomic History   • Marital status:    Tobacco Use   • Smoking status: Former     Packs/day: 1.00     Years: 50.00     Pack years: 50.00     Types: Cigarettes     Quit date: 1/10/2022     Years since quittin.2   • Smokeless tobacco: Never   • Tobacco comments:     caffeine use   Vaping Use   • Vaping Use: Never used   Substance and Sexual Activity   • Alcohol use: Yes     Comment: Occasional social   • Drug use: No   • Sexual activity: Not Currently     Partners: Female       Family History   Problem Relation Age of Onset   • Heart attack Mother    • Cancer Mother         Passes in    • Heart disease Mother         Five heart attacks. Pacemaker   • Heart attack Father    • Heart disease Father         Passed from heart attack in    • Colon polyps Neg Hx    • Colon cancer Neg Hx    • Malig Hyperthermia Neg Hx        Review of Systems   Constitutional: Negative for diaphoresis, fever and  malaise/fatigue.   HENT: Negative for nosebleeds and sore throat.    Eyes: Negative for blurred vision and double vision.   Cardiovascular: Positive for leg swelling. Negative for chest pain, claudication, dyspnea on exertion, irregular heartbeat, near-syncope, orthopnea, palpitations, paroxysmal nocturnal dyspnea and syncope.   Respiratory: Negative for cough, shortness of breath, sleep disturbances due to breathing and snoring.    Endocrine: Negative for cold intolerance, heat intolerance and polydipsia.   Skin: Negative for itching, poor wound healing and rash.   Musculoskeletal: Negative for falls, joint pain, joint swelling, muscle weakness and myalgias.   Gastrointestinal: Negative for abdominal pain, melena, nausea and vomiting.   Neurological: Negative for dizziness, light-headedness, loss of balance, seizures, vertigo and weakness.   Psychiatric/Behavioral: Negative for altered mental status, depression and substance abuse.       No Known Allergies      Current Outpatient Medications:   •  Alpha-Lipoic Acid 600 MG tablet, Take 2 tablets by mouth., Disp: , Rfl:   •  amLODIPine (NORVASC) 5 MG tablet, Take 1 tablet by mouth Daily., Disp: 90 tablet, Rfl: 1  •  apixaban (ELIQUIS) 5 MG tablet tablet, Take 1 tablet by mouth Every 12 (Twelve) Hours. Indications: Other - full anticoagulation, Disp: 60 tablet, Rfl: 5  •  aspirin 325 MG tablet, Take 1 tablet by mouth Daily. PT STATED TO CONTINUE PER MD, Disp: , Rfl:   •  atorvastatin (LIPITOR) 40 MG tablet, TAKE 1 TABLET EVERY NIGHT, Disp: 90 tablet, Rfl: 1  •  cetirizine (ZyrTEC) 10 MG tablet, Take 1 tablet by mouth Daily., Disp: , Rfl:   •  glipizide (GLUCOTROL) 5 MG tablet, TAKE 1 TABLET EVERY DAY BEFORE SUPPER, Disp: 90 tablet, Rfl: 1  •  lansoprazole (PREVACID) 15 MG capsule, Take 1 capsule by mouth Daily. (Patient taking differently: Take 1 capsule by mouth Every Morning.), Disp: 90 capsule, Rfl: 1  •  metFORMIN (GLUCOPHAGE) 500 MG tablet, TAKE 2 TABLETS TWICE  DAILY WITH MEALS, Disp: 360 tablet, Rfl: 1  •  metoprolol succinate XL (TOPROL-XL) 50 MG 24 hr tablet, TAKE 1 TABLET EVERY DAY, Disp: 90 tablet, Rfl: 0  •  Misc Natural Products (TART CHERRY ADVANCED) capsule, Take 1 tablet by mouth Daily. HOLD PRIOR TO OR, Disp: , Rfl:   •  Multiple Vitamin (MULTIVITAMIN) capsule, Take 1 capsule by mouth Daily. HOLD PRIOR TO OR, Disp: , Rfl:   •  olmesartan-hydrochlorothiazide (BENICAR HCT) 40-25 MG per tablet, Take 1 tablet by mouth Daily., Disp: 90 tablet, Rfl: 1  •  Omega-3 Fatty Acids (FISH OIL) 1200 MG capsule capsule, Take 1 capsule by mouth Daily With Breakfast. HOLD PRIOR TO OR, Disp: , Rfl:   •  tamsulosin (FLOMAX) 0.4 MG capsule 24 hr capsule, TAKE 1 CAPSULE EVERY DAY, Disp: 90 capsule, Rfl: 1      Objective:     There were no vitals filed for this visit.  There is no height or weight on file to calculate BMI.    PHYSICAL EXAM:    Constitutional:       General: Not in acute distress.     Appearance: Normal appearance. Well-developed.   Eyes:      General: No scleral icterus.     Conjunctiva/sclera: Conjunctivae normal.      Pupils: Pupils are equal, round, and reactive to light.   HENT:      Head: Normocephalic and atraumatic.   Neck:      Thyroid: No thyromegaly.      Vascular: Normal carotid pulses. No carotid bruit, hepatojugular reflux or JVD.      Trachea: No tracheal deviation.   Pulmonary:      Effort: Pulmonary effort is normal. No tachypnea or respiratory distress.      Breath sounds: Normal breath sounds. No decreased breath sounds. No wheezing. No rhonchi. No rales.   Chest:      Chest wall: Not tender to palpatation.   Cardiovascular:      Normal rate. Regular rhythm.      No gallop.   Edema:     Peripheral edema present.     Pretibial: 1+ edema of the right pretibial area.     Ankle: 1+ edema of the right ankle.  Abdominal:      General: Bowel sounds are normal. There is no distension.      Palpations: Abdomen is soft.      Tenderness: There is no abdominal  tenderness.   Musculoskeletal: Normal range of motion.         General: No deformity.      Cervical back: Normal range of motion and neck supple. No edema. Skin:     General: Skin is warm and dry.      Findings: No erythema or rash.   Neurological:      Mental Status: Alert and oriented to person, place, and time.      Sensory: No sensory deficit.   Psychiatric:         Behavior: Behavior normal.           ECG 12 Lead    Date/Time: 4/27/2023 1:13 PM  Performed by: Shamir Manriquez MD  Authorized by: Shamir Manriquez MD   Comparison: compared with previous ECG from 4/15/2022  Similar to previous ECG  Rhythm: sinus rhythm  Conduction: 1st degree AV block and non-specific intraventricular conduction delay           2/18/22  • Calculated left ventricular EF = 47.3% Calculated left ventricular 3D EF = 44% Estimated left ventricular EF was in agreement with the calculated left ventricular EF. Left ventricular systolic function is mildly decreased.  • Left ventricular wall thickness is consistent with moderate concentric hypertrophy.  • Left ventricular diastolic function is consistent with (grade I) impaired relaxation.  • The following left ventricular wall segments are hypokinetic: basal inferolateral and mid inferolateral.  • No significant valvular regurgitation or stenosis        Assessment:      Plan:   77-year-old with a medical history of coronary artery disease, peripheral arterial disease, ischemic cardiomyopathy, right Frias-pop bypass, who presents to me in follow-up.  Has been doing very well as of late.  He denies any chest pain or dyspnea.  His blood pressure and heart rate are well controlled.    1.  Coronary artery disease: Prior  of the RCA.  Disease.  No angina.  - Continue with aspirin.  - No change in Toprol 50 mg p.o. daily.  Tolerating well.  - Continue atorvastatin 40 mg p.o. nightly.  No changes indicated.  Labs have been reviewed and control is acceptable.  No myalgias.    2.   Essential hypertension: Much better controlled than prior.  Continue amlodipine and olmesartan-HCTZ therapy.  Also on Toprol.  No significant issues with resting bradycardia or fatigue.    3.  Hyperlipidemia: Continue atorvastatin 40 g p.o. nightly.  Labs from 1/2023 with LDL of 56.  Excellent control.    We will see him back in 1 year.       Your medication list          Accurate as of April 27, 2023  1:09 PM. If you have any questions, ask your nurse or doctor.            CHANGE how you take these medications      Instructions Last Dose Given Next Dose Due   lansoprazole 15 MG capsule  Commonly known as: PREVACID  What changed: when to take this      Take 1 capsule by mouth Daily.          CONTINUE taking these medications      Instructions Last Dose Given Next Dose Due   Alpha-Lipoic Acid 600 MG tablet      Take 2 tablets by mouth.       amLODIPine 5 MG tablet  Commonly known as: NORVASC      Take 1 tablet by mouth Daily.       aspirin 325 MG tablet      Take 1 tablet by mouth Daily. PT STATED TO CONTINUE PER MD       atorvastatin 40 MG tablet  Commonly known as: LIPITOR      TAKE 1 TABLET EVERY NIGHT       cetirizine 10 MG tablet  Commonly known as: zyrTEC      Take 1 tablet by mouth Daily.       Eliquis 5 MG tablet tablet  Generic drug: apixaban      Take 1 tablet by mouth Every 12 (Twelve) Hours. Indications: Other - full anticoagulation       fish oil 1200 MG capsule capsule      Take 1 capsule by mouth Daily With Breakfast. HOLD PRIOR TO OR       glipizide 5 MG tablet  Commonly known as: GLUCOTROL      TAKE 1 TABLET EVERY DAY BEFORE SUPPER       metFORMIN 500 MG tablet  Commonly known as: GLUCOPHAGE      TAKE 2 TABLETS TWICE DAILY WITH MEALS       metoprolol succinate XL 50 MG 24 hr tablet  Commonly known as: TOPROL-XL      TAKE 1 TABLET EVERY DAY       multivitamin capsule      Take 1 capsule by mouth Daily. HOLD PRIOR TO OR       olmesartan-hydrochlorothiazide 40-25 MG per tablet  Commonly known as:  BENICAR HCT      Take 1 tablet by mouth Daily.       tamsulosin 0.4 MG capsule 24 hr capsule  Commonly known as: FLOMAX      TAKE 1 CAPSULE EVERY DAY       Tart Swanson Advanced capsule      Take 1 tablet by mouth Daily. HOLD PRIOR TO OR

## 2023-04-27 NOTE — TELEPHONE ENCOUNTER
Rx Refill Note  Requested Prescriptions     Pending Prescriptions Disp Refills   • amLODIPine (NORVASC) 5 MG tablet 90 tablet 0     Sig: Take 1 tablet by mouth Daily.      Last office visit with prescribing clinician: 3/31/2023   Last telemedicine visit with prescribing clinician: 9/29/2023   Next office visit with prescribing clinician: 9/29/2023                         Would you like a call back once the refill request has been completed: [] Yes [] No    If the office needs to give you a call back, can they leave a voicemail: [] Yes [] No    Marie Pena MA  04/27/23, 10:10 EDT

## 2023-07-24 RX ORDER — METOPROLOL SUCCINATE 50 MG/1
TABLET, EXTENDED RELEASE ORAL
Qty: 90 TABLET | Refills: 0 | Status: SHIPPED | OUTPATIENT
Start: 2023-07-24

## 2023-07-24 NOTE — TELEPHONE ENCOUNTER
Rx Refill Note  Requested Prescriptions     Pending Prescriptions Disp Refills    metoprolol succinate XL (TOPROL-XL) 50 MG 24 hr tablet [Pharmacy Med Name: METOPROLOL SUCCINATE ER 50 MG Tablet Extended Release 24 Hour] 90 tablet 0     Sig: TAKE 1 TABLET EVERY DAY      Last office visit with prescribing clinician: 3/31/2023   Last telemedicine visit with prescribing clinician: Visit date not found   Next office visit with prescribing clinician: 9/29/2023                         Would you like a call back once the refill request has been completed: [] Yes [] No    If the office needs to give you a call back, can they leave a voicemail: [] Yes [] No    Marie Pena MA  07/24/23, 07:30 EDT

## 2023-08-07 RX ORDER — OLMESARTAN MEDOXOMIL AND HYDROCHLOROTHIAZIDE 20/12.5 20; 12.5 MG/1; MG/1
TABLET ORAL
Qty: 90 TABLET | OUTPATIENT
Start: 2023-08-07

## 2023-08-07 NOTE — TELEPHONE ENCOUNTER
Rx Refill Note  Requested Prescriptions     Pending Prescriptions Disp Refills    olmesartan-hydrochlorothiazide (BENICAR HCT) 20-12.5 MG per tablet [Pharmacy Med Name: OLMESARTAN MEDOXOMIL/HYDROCHLOROTHIAZIDE 20-12.5 MG Tablet] 90 tablet      Sig: TAKE 1 TABLET EVERY DAY      Last office visit with prescribing clinician: 3/31/2023   Last telemedicine visit with prescribing clinician: Visit date not found   Next office visit with prescribing clinician: 9/29/2023                         Would you like a call back once the refill request has been completed: [] Yes [] No    If the office needs to give you a call back, can they leave a voicemail: [] Yes [] No    Marie Pena MA  08/07/23, 09:18 EDT

## 2023-08-17 DIAGNOSIS — E78.5 HYPERLIPIDEMIA ASSOCIATED WITH TYPE 2 DIABETES MELLITUS: ICD-10-CM

## 2023-08-17 DIAGNOSIS — E78.1 HIGH TRIGLYCERIDES: ICD-10-CM

## 2023-08-17 DIAGNOSIS — E11.69 HYPERLIPIDEMIA ASSOCIATED WITH TYPE 2 DIABETES MELLITUS: ICD-10-CM

## 2023-08-17 RX ORDER — ATORVASTATIN CALCIUM 40 MG/1
TABLET, FILM COATED ORAL
Qty: 90 TABLET | Refills: 1 | Status: SHIPPED | OUTPATIENT
Start: 2023-08-17

## 2023-08-17 NOTE — TELEPHONE ENCOUNTER
Rx Refill Note  Requested Prescriptions     Pending Prescriptions Disp Refills    atorvastatin (LIPITOR) 40 MG tablet [Pharmacy Med Name: ATORVASTATIN CALCIUM 40 MG Tablet] 90 tablet 1     Sig: TAKE 1 TABLET EVERY NIGHT      Last office visit with prescribing clinician: 3/31/2023   Last telemedicine visit with prescribing clinician: Visit date not found   Next office visit with prescribing clinician: 9/29/2023                         Would you like a call back once the refill request has been completed: [] Yes [] No    If the office needs to give you a call back, can they leave a voicemail: [] Yes [] No    Marie Pena MA  08/17/23, 17:35 EDT

## 2023-09-02 DIAGNOSIS — R39.12 WEAK URINE STREAM: ICD-10-CM

## 2023-09-02 DIAGNOSIS — N39.43 DRIBBLING OF URINE: ICD-10-CM

## 2023-09-07 RX ORDER — TAMSULOSIN HYDROCHLORIDE 0.4 MG/1
CAPSULE ORAL
Qty: 90 CAPSULE | Refills: 1 | Status: SHIPPED | OUTPATIENT
Start: 2023-09-07

## 2023-09-14 RX ORDER — GLIPIZIDE 5 MG/1
TABLET ORAL
Qty: 90 TABLET | Refills: 1 | Status: SHIPPED | OUTPATIENT
Start: 2023-09-14

## 2023-09-14 NOTE — TELEPHONE ENCOUNTER
Rx Refill Note  Requested Prescriptions     Pending Prescriptions Disp Refills    glipizide (GLUCOTROL) 5 MG tablet [Pharmacy Med Name: GLIPIZIDE 5 MG Tablet] 90 tablet 1     Sig: TAKE 1 TABLET EVERY DAY BEFORE SUPPER      Last office visit with prescribing clinician: 3/31/2023   Last telemedicine visit with prescribing clinician: Visit date not found   Next office visit with prescribing clinician: 9/29/2023                         Would you like a call back once the refill request has been completed: [] Yes [] No    If the office needs to give you a call back, can they leave a voicemail: [] Yes [] No    Marie Pena MA  09/14/23, 07:28 EDT

## 2023-09-14 NOTE — TELEPHONE ENCOUNTER
Rx Refill Note  Requested Prescriptions     Pending Prescriptions Disp Refills    metFORMIN (GLUCOPHAGE) 500 MG tablet [Pharmacy Med Name: METFORMIN HYDROCHLORIDE 500 MG Tablet] 360 tablet 1     Sig: TAKE 2 TABLETS TWICE A DAY WITH MEALS      Last office visit with prescribing clinician: Visit date not found   Last telemedicine visit with prescribing clinician: Visit date not found   Next office visit with prescribing clinician: Visit date not found                         Would you like a call back once the refill request has been completed: [] Yes [] No    If the office needs to give you a call back, can they leave a voicemail: [] Yes [] No    Josafat Mitchell MA  09/14/23, 08:02 EDT

## 2023-11-19 DIAGNOSIS — I10 ESSENTIAL HYPERTENSION: Chronic | ICD-10-CM

## 2023-11-20 RX ORDER — OLMESARTAN MEDOXOMIL AND HYDROCHLOROTHIAZIDE 40/25 40; 25 MG/1; MG/1
1 TABLET ORAL DAILY
Qty: 90 TABLET | Refills: 1 | Status: SHIPPED | OUTPATIENT
Start: 2023-11-20

## 2023-11-20 NOTE — TELEPHONE ENCOUNTER
Rx Refill Note  Requested Prescriptions     Pending Prescriptions Disp Refills    olmesartan-hydrochlorothiazide (BENICAR HCT) 40-25 MG per tablet [Pharmacy Med Name: OLMESARTAN-HCTZ 40-25 MG TAB] 90 tablet 1     Sig: TAKE ONE TABLET BY MOUTH DAILY      Last office visit with prescribing clinician: 3/31/2023   Last telemedicine visit with prescribing clinician: Visit date not found   Next office visit with prescribing clinician: Visit date not found                         Would you like a call back once the refill request has been completed: [] Yes [] No    If the office needs to give you a call back, can they leave a voicemail: [] Yes [] No    Josafat Mitchell MA  11/20/23, 13:45 EST

## 2024-01-01 ENCOUNTER — HOSPITAL ENCOUNTER (INPATIENT)
Facility: HOSPITAL | Age: 79
LOS: 1 days | DRG: 291 | End: 2024-04-14
Attending: EMERGENCY MEDICINE | Admitting: INTERNAL MEDICINE
Payer: MEDICARE

## 2024-01-01 ENCOUNTER — APPOINTMENT (OUTPATIENT)
Dept: GENERAL RADIOLOGY | Facility: HOSPITAL | Age: 79
DRG: 291 | End: 2024-01-01
Payer: MEDICARE

## 2024-01-01 VITALS
WEIGHT: 154.1 LBS | SYSTOLIC BLOOD PRESSURE: 99 MMHG | DIASTOLIC BLOOD PRESSURE: 68 MMHG | BODY MASS INDEX: 24.19 KG/M2 | TEMPERATURE: 97.4 F | RESPIRATION RATE: 26 BRPM | HEART RATE: 105 BPM | OXYGEN SATURATION: 97 % | HEIGHT: 67 IN

## 2024-01-01 DIAGNOSIS — I50.9 ACUTE CONGESTIVE HEART FAILURE, UNSPECIFIED HEART FAILURE TYPE: ICD-10-CM

## 2024-01-01 DIAGNOSIS — I25.5 ISCHEMIC CARDIOMYOPATHY: ICD-10-CM

## 2024-01-01 DIAGNOSIS — J96.01 ACUTE RESPIRATORY FAILURE WITH HYPOXIA: Primary | ICD-10-CM

## 2024-01-01 DIAGNOSIS — D68.9 COAGULOPATHY: ICD-10-CM

## 2024-01-01 LAB
ALBUMIN SERPL-MCNC: 4 G/DL (ref 3.5–5.2)
ALBUMIN/GLOB SERPL: 1.4 G/DL
ALP SERPL-CCNC: 98 U/L (ref 39–117)
ALT SERPL W P-5'-P-CCNC: 18 U/L (ref 1–41)
AMPHET+METHAMPHET UR QL: NEGATIVE
ANION GAP SERPL CALCULATED.3IONS-SCNC: 17 MMOL/L (ref 5–15)
ANION GAP SERPL CALCULATED.3IONS-SCNC: 18.3 MMOL/L (ref 5–15)
ARTERIAL PATENCY WRIST A: POSITIVE
AST SERPL-CCNC: 16 U/L (ref 1–40)
ATMOSPHERIC PRESS: 750.1 MMHG
B PARAPERT DNA SPEC QL NAA+PROBE: NOT DETECTED
B PERT DNA SPEC QL NAA+PROBE: NOT DETECTED
BARBITURATES UR QL SCN: NEGATIVE
BASE EXCESS BLDA CALC-SCNC: -10 MMOL/L (ref 0–2)
BASOPHILS # BLD AUTO: 0.04 10*3/MM3 (ref 0–0.2)
BASOPHILS # BLD AUTO: 0.04 10*3/MM3 (ref 0–0.2)
BASOPHILS NFR BLD AUTO: 0.3 % (ref 0–1.5)
BASOPHILS NFR BLD AUTO: 0.3 % (ref 0–1.5)
BDY SITE: ABNORMAL
BENZODIAZ UR QL SCN: NEGATIVE
BILIRUB SERPL-MCNC: 0.2 MG/DL (ref 0–1.2)
BUN BLDA-MCNC: 23 MG/DL
BUN SERPL-MCNC: 24 MG/DL (ref 8–23)
BUN SERPL-MCNC: 28 MG/DL (ref 8–23)
BUN/CREAT SERPL: 21.2 (ref 7–25)
BUN/CREAT SERPL: 23.1 (ref 7–25)
C PNEUM DNA NPH QL NAA+NON-PROBE: NOT DETECTED
CA-I BLDA-SCNC: 1.21 MMOL/L (ref 2.2–2.55)
CALCIUM SPEC-SCNC: 8.7 MG/DL (ref 8.6–10.5)
CALCIUM SPEC-SCNC: 8.8 MG/DL (ref 8.6–10.5)
CANNABINOIDS SERPL QL: NEGATIVE
CHLORIDE BLDA-SCNC: 111 MMOL/L (ref 98–107)
CHLORIDE SERPL-SCNC: 105 MMOL/L (ref 98–107)
CHLORIDE SERPL-SCNC: 107 MMOL/L (ref 98–107)
CO2 BLDA-SCNC: 16.1 MMOL/L (ref 23–27)
CO2 SERPL-SCNC: 15.7 MMOL/L (ref 22–29)
CO2 SERPL-SCNC: 16 MMOL/L (ref 22–29)
COCAINE UR QL: NEGATIVE
CREAT BLDA-MCNC: 1 MG/DL (ref 0.6–130)
CREAT SERPL-MCNC: 1.13 MG/DL (ref 0.76–1.27)
CREAT SERPL-MCNC: 1.21 MG/DL (ref 0.76–1.27)
D DIMER PPP FEU-MCNC: 1.25 MCGFEU/ML (ref 0–0.77)
D-LACTATE SERPL-SCNC: 4.8 MMOL/L (ref 0.5–2)
D-LACTATE SERPL-SCNC: 4.9 MMOL/L (ref 0.5–2)
D-LACTATE SERPL-SCNC: 5 MMOL/L (ref 0.5–2)
DEPRECATED RDW RBC AUTO: 43.2 FL (ref 37–54)
DEPRECATED RDW RBC AUTO: 43.7 FL (ref 37–54)
EGFRCR SERPLBLD CKD-EPI 2021: 61.7 ML/MIN/1.73
EGFRCR SERPLBLD CKD-EPI 2021: 66.9 ML/MIN/1.73
EOSINOPHIL # BLD AUTO: 0.01 10*3/MM3 (ref 0–0.4)
EOSINOPHIL # BLD AUTO: 0.26 10*3/MM3 (ref 0–0.4)
EOSINOPHIL NFR BLD AUTO: 0.1 % (ref 0.3–6.2)
EOSINOPHIL NFR BLD AUTO: 2.1 % (ref 0.3–6.2)
ERYTHROCYTE [DISTWIDTH] IN BLOOD BY AUTOMATED COUNT: 12.2 % (ref 12.3–15.4)
ERYTHROCYTE [DISTWIDTH] IN BLOOD BY AUTOMATED COUNT: 12.2 % (ref 12.3–15.4)
FENTANYL UR-MCNC: NEGATIVE NG/ML
FLUAV SUBTYP SPEC NAA+PROBE: NOT DETECTED
FLUBV RNA ISLT QL NAA+PROBE: NOT DETECTED
GAS FLOW AIRWAY: 15 LPM
GEN 5 2HR TROPONIN T REFLEX: 779 NG/L
GLOBULIN UR ELPH-MCNC: 2.9 GM/DL
GLUCOSE BLDC GLUCOMTR-MCNC: 102 MG/DL (ref 70–130)
GLUCOSE BLDC GLUCOMTR-MCNC: 292 MG/DL (ref 70–130)
GLUCOSE BLDC GLUCOMTR-MCNC: 332 MG/DL (ref 70–130)
GLUCOSE SERPL-MCNC: 288 MG/DL (ref 65–99)
GLUCOSE SERPL-MCNC: 322 MG/DL (ref 65–99)
HADV DNA SPEC NAA+PROBE: NOT DETECTED
HCO3 BLDA-SCNC: 16.1 MMOL/L (ref 22–28)
HCOV 229E RNA SPEC QL NAA+PROBE: NOT DETECTED
HCOV HKU1 RNA SPEC QL NAA+PROBE: NOT DETECTED
HCOV NL63 RNA SPEC QL NAA+PROBE: NOT DETECTED
HCOV OC43 RNA SPEC QL NAA+PROBE: NOT DETECTED
HCT VFR BLD AUTO: 38.3 % (ref 37.5–51)
HCT VFR BLD AUTO: 41.4 % (ref 37.5–51)
HCT VFR BLDA CALC: 43 % (ref 38–51)
HEMODILUTION: NO
HGB BLD-MCNC: 12.5 G/DL (ref 13–17.7)
HGB BLD-MCNC: 13.1 G/DL (ref 13–17.7)
HGB BLDA-MCNC: 14.7 G/DL (ref 12–17)
HMPV RNA NPH QL NAA+NON-PROBE: NOT DETECTED
HOLD SPECIMEN: NORMAL
HOLD SPECIMEN: NORMAL
HPIV1 RNA ISLT QL NAA+PROBE: NOT DETECTED
HPIV2 RNA SPEC QL NAA+PROBE: NOT DETECTED
HPIV3 RNA NPH QL NAA+PROBE: NOT DETECTED
HPIV4 P GENE NPH QL NAA+PROBE: NOT DETECTED
IMM GRANULOCYTES # BLD AUTO: 0.04 10*3/MM3 (ref 0–0.05)
IMM GRANULOCYTES # BLD AUTO: 0.06 10*3/MM3 (ref 0–0.05)
IMM GRANULOCYTES NFR BLD AUTO: 0.3 % (ref 0–0.5)
IMM GRANULOCYTES NFR BLD AUTO: 0.4 % (ref 0–0.5)
LYMPHOCYTES # BLD AUTO: 1.37 10*3/MM3 (ref 0.7–3.1)
LYMPHOCYTES # BLD AUTO: 4.46 10*3/MM3 (ref 0.7–3.1)
LYMPHOCYTES NFR BLD AUTO: 36.6 % (ref 19.6–45.3)
LYMPHOCYTES NFR BLD AUTO: 8.7 % (ref 19.6–45.3)
Lab: ABNORMAL
Lab: ABNORMAL
M PNEUMO IGG SER IA-ACNC: NOT DETECTED
MAGNESIUM SERPL-MCNC: 1.5 MG/DL (ref 1.6–2.4)
MCH RBC QN AUTO: 30.4 PG (ref 26.6–33)
MCH RBC QN AUTO: 31.6 PG (ref 26.6–33)
MCHC RBC AUTO-ENTMCNC: 31.6 G/DL (ref 31.5–35.7)
MCHC RBC AUTO-ENTMCNC: 32.6 G/DL (ref 31.5–35.7)
MCV RBC AUTO: 96.1 FL (ref 79–97)
MCV RBC AUTO: 96.7 FL (ref 79–97)
METHADONE UR QL SCN: NEGATIVE
MODALITY: ABNORMAL
MONOCYTES # BLD AUTO: 0.55 10*3/MM3 (ref 0.1–0.9)
MONOCYTES # BLD AUTO: 0.92 10*3/MM3 (ref 0.1–0.9)
MONOCYTES NFR BLD AUTO: 3.5 % (ref 5–12)
MONOCYTES NFR BLD AUTO: 7.5 % (ref 5–12)
NEUTROPHILS NFR BLD AUTO: 13.72 10*3/MM3 (ref 1.7–7)
NEUTROPHILS NFR BLD AUTO: 53.2 % (ref 42.7–76)
NEUTROPHILS NFR BLD AUTO: 6.47 10*3/MM3 (ref 1.7–7)
NEUTROPHILS NFR BLD AUTO: 87 % (ref 42.7–76)
NOTIFIED WHO: ABNORMAL
NRBC BLD AUTO-RTO: 0 /100 WBC (ref 0–0.2)
NRBC BLD AUTO-RTO: 0 /100 WBC (ref 0–0.2)
NT-PROBNP SERPL-MCNC: ABNORMAL PG/ML (ref 0–1800)
OPIATES UR QL: NEGATIVE
OXYCODONE UR QL SCN: NEGATIVE
PCO2 BLDA: 35.5 MM HG (ref 35–45)
PH BLDA: 7.26 PH UNITS (ref 7.35–7.45)
PLATELET # BLD AUTO: 275 10*3/MM3 (ref 140–450)
PLATELET # BLD AUTO: 352 10*3/MM3 (ref 140–450)
PMV BLD AUTO: 10.8 FL (ref 6–12)
PMV BLD AUTO: 11.2 FL (ref 6–12)
PO2 BLDA: 92.9 MM HG (ref 80–100)
POTASSIUM BLDA-SCNC: 4.3 MMOL/L (ref 3.5–5.2)
POTASSIUM SERPL-SCNC: 4.9 MMOL/L (ref 3.5–5.2)
POTASSIUM SERPL-SCNC: 5.3 MMOL/L (ref 3.5–5.2)
PROCALCITONIN SERPL-MCNC: 0.06 NG/ML (ref 0–0.25)
PROT SERPL-MCNC: 6.9 G/DL (ref 6–8.5)
QT INTERVAL: 356 MS
QT INTERVAL: 395 MS
QT INTERVAL: 396 MS
QTC INTERVAL: 510 MS
QTC INTERVAL: 514 MS
QTC INTERVAL: 525 MS
RBC # BLD AUTO: 3.96 10*6/MM3 (ref 4.14–5.8)
RBC # BLD AUTO: 4.31 10*6/MM3 (ref 4.14–5.8)
READ BACK: YES
RHINOVIRUS RNA SPEC NAA+PROBE: NOT DETECTED
RSV RNA NPH QL NAA+NON-PROBE: NOT DETECTED
SAO2 % BLDCOA: 96 % (ref 92–98.5)
SARS-COV-2 RNA NPH QL NAA+NON-PROBE: NOT DETECTED
SODIUM BLD-SCNC: 140 MMOL/L (ref 136–145)
SODIUM SERPL-SCNC: 139 MMOL/L (ref 136–145)
SODIUM SERPL-SCNC: 140 MMOL/L (ref 136–145)
TOTAL RATE: 20 BREATHS/MINUTE
TROPONIN T DELTA: -109 NG/L
TROPONIN T SERPL HS-MCNC: 888 NG/L
WBC NRBC COR # BLD AUTO: 12.19 10*3/MM3 (ref 3.4–10.8)
WBC NRBC COR # BLD AUTO: 15.75 10*3/MM3 (ref 3.4–10.8)
WHOLE BLOOD HOLD COAG: NORMAL
WHOLE BLOOD HOLD SPECIMEN: NORMAL

## 2024-01-01 PROCEDURE — 85379 FIBRIN DEGRADATION QUANT: CPT

## 2024-01-01 PROCEDURE — 25010000002 AMIODARONE PER 30 MG: Performed by: INTERNAL MEDICINE

## 2024-01-01 PROCEDURE — 84484 ASSAY OF TROPONIN QUANT: CPT

## 2024-01-01 PROCEDURE — 85018 HEMOGLOBIN: CPT

## 2024-01-01 PROCEDURE — 25810000003 SODIUM CHLORIDE 0.9 % SOLUTION: Performed by: INTERNAL MEDICINE

## 2024-01-01 PROCEDURE — 83605 ASSAY OF LACTIC ACID: CPT

## 2024-01-01 PROCEDURE — 94799 UNLISTED PULMONARY SVC/PX: CPT

## 2024-01-01 PROCEDURE — 99291 CRITICAL CARE FIRST HOUR: CPT

## 2024-01-01 PROCEDURE — 80053 COMPREHEN METABOLIC PANEL: CPT | Performed by: EMERGENCY MEDICINE

## 2024-01-01 PROCEDURE — 25010000002 EPINEPHRINE 1 MG/10ML SOLUTION PREFILLED SYRINGE: Performed by: INTERNAL MEDICINE

## 2024-01-01 PROCEDURE — 36600 WITHDRAWAL OF ARTERIAL BLOOD: CPT

## 2024-01-01 PROCEDURE — 80307 DRUG TEST PRSMV CHEM ANLYZR: CPT

## 2024-01-01 PROCEDURE — 80048 BASIC METABOLIC PNL TOTAL CA: CPT

## 2024-01-01 PROCEDURE — 25010000002 MAGNESIUM SULFATE PER 500 MG OF MAGNESIUM: Performed by: INTERNAL MEDICINE

## 2024-01-01 PROCEDURE — 92950 HEART/LUNG RESUSCITATION CPR: CPT

## 2024-01-01 PROCEDURE — 93005 ELECTROCARDIOGRAM TRACING: CPT

## 2024-01-01 PROCEDURE — 93010 ELECTROCARDIOGRAM REPORT: CPT | Performed by: INTERNAL MEDICINE

## 2024-01-01 PROCEDURE — 82948 REAGENT STRIP/BLOOD GLUCOSE: CPT

## 2024-01-01 PROCEDURE — 83735 ASSAY OF MAGNESIUM: CPT

## 2024-01-01 PROCEDURE — 84145 PROCALCITONIN (PCT): CPT

## 2024-01-01 PROCEDURE — 71045 X-RAY EXAM CHEST 1 VIEW: CPT

## 2024-01-01 PROCEDURE — 25010000002 FUROSEMIDE PER 20 MG: Performed by: EMERGENCY MEDICINE

## 2024-01-01 PROCEDURE — 80047 BASIC METABLC PNL IONIZED CA: CPT

## 2024-01-01 PROCEDURE — 84484 ASSAY OF TROPONIN QUANT: CPT | Performed by: EMERGENCY MEDICINE

## 2024-01-01 PROCEDURE — 25010000002 ATROPINE PER 0.01 MG: Performed by: INTERNAL MEDICINE

## 2024-01-01 PROCEDURE — 82803 BLOOD GASES ANY COMBINATION: CPT

## 2024-01-01 PROCEDURE — 63710000001 INSULIN REGULAR HUMAN PER 5 UNITS

## 2024-01-01 PROCEDURE — 85025 COMPLETE CBC W/AUTO DIFF WBC: CPT | Performed by: EMERGENCY MEDICINE

## 2024-01-01 PROCEDURE — 25010000002 METHYLPREDNISOLONE PER 40 MG

## 2024-01-01 PROCEDURE — 5A12012 PERFORMANCE OF CARDIAC OUTPUT, SINGLE, MANUAL: ICD-10-PCS | Performed by: INTERNAL MEDICINE

## 2024-01-01 PROCEDURE — 83880 ASSAY OF NATRIURETIC PEPTIDE: CPT | Performed by: EMERGENCY MEDICINE

## 2024-01-01 PROCEDURE — 25010000002 EPINEPHRINE 1 MG/ML SOLUTION: Performed by: INTERNAL MEDICINE

## 2024-01-01 PROCEDURE — 93005 ELECTROCARDIOGRAM TRACING: CPT | Performed by: EMERGENCY MEDICINE

## 2024-01-01 PROCEDURE — 0202U NFCT DS 22 TRGT SARS-COV-2: CPT | Performed by: EMERGENCY MEDICINE

## 2024-01-01 PROCEDURE — 85025 COMPLETE CBC W/AUTO DIFF WBC: CPT

## 2024-01-01 RX ORDER — AMIODARONE HYDROCHLORIDE 150 MG/3ML
INJECTION, SOLUTION INTRAVENOUS
Status: COMPLETED | OUTPATIENT
Start: 2024-01-01 | End: 2024-01-01

## 2024-01-01 RX ORDER — ACETAMINOPHEN 325 MG/1
650 TABLET ORAL EVERY 4 HOURS PRN
Status: DISCONTINUED | OUTPATIENT
Start: 2024-01-01 | End: 2024-01-01 | Stop reason: HOSPADM

## 2024-01-01 RX ORDER — TAMSULOSIN HYDROCHLORIDE 0.4 MG/1
0.4 CAPSULE ORAL DAILY
Status: DISCONTINUED | OUTPATIENT
Start: 2024-01-01 | End: 2024-01-01 | Stop reason: HOSPADM

## 2024-01-01 RX ORDER — FUROSEMIDE 10 MG/ML
40 INJECTION INTRAMUSCULAR; INTRAVENOUS
Status: DISCONTINUED | OUTPATIENT
Start: 2024-01-01 | End: 2024-01-01 | Stop reason: HOSPADM

## 2024-01-01 RX ORDER — SODIUM CHLORIDE 9 MG/ML
40 INJECTION, SOLUTION INTRAVENOUS AS NEEDED
Status: DISCONTINUED | OUTPATIENT
Start: 2024-01-01 | End: 2024-01-01 | Stop reason: HOSPADM

## 2024-01-01 RX ORDER — ONDANSETRON 2 MG/ML
4 INJECTION INTRAMUSCULAR; INTRAVENOUS EVERY 6 HOURS PRN
Status: DISCONTINUED | OUTPATIENT
Start: 2024-01-01 | End: 2024-01-01 | Stop reason: HOSPADM

## 2024-01-01 RX ORDER — ATORVASTATIN CALCIUM 20 MG/1
40 TABLET, FILM COATED ORAL DAILY
Status: DISCONTINUED | OUTPATIENT
Start: 2024-01-01 | End: 2024-01-01 | Stop reason: HOSPADM

## 2024-01-01 RX ORDER — SODIUM CHLORIDE 0.9 % (FLUSH) 0.9 %
10 SYRINGE (ML) INJECTION AS NEEDED
Status: DISCONTINUED | OUTPATIENT
Start: 2024-01-01 | End: 2024-01-01 | Stop reason: HOSPADM

## 2024-01-01 RX ORDER — FENTANYL CITRATE 50 UG/ML
INJECTION, SOLUTION INTRAMUSCULAR; INTRAVENOUS
Status: DISCONTINUED
Start: 2024-01-01 | End: 2024-01-01 | Stop reason: HOSPADM

## 2024-01-01 RX ORDER — BISACODYL 5 MG/1
5 TABLET, DELAYED RELEASE ORAL DAILY PRN
Status: DISCONTINUED | OUTPATIENT
Start: 2024-01-01 | End: 2024-01-01 | Stop reason: HOSPADM

## 2024-01-01 RX ORDER — CALCIUM CHLORIDE 100 MG/ML
INJECTION INTRAVENOUS; INTRAVENTRICULAR
Status: COMPLETED | OUTPATIENT
Start: 2024-01-01 | End: 2024-01-01

## 2024-01-01 RX ORDER — DOPAMINE HYDROCHLORIDE 160 MG/100ML
INJECTION, SOLUTION INTRAVENOUS
Status: DISCONTINUED
Start: 2024-01-01 | End: 2024-01-01 | Stop reason: HOSPADM

## 2024-01-01 RX ORDER — DEXTROSE MONOHYDRATE 25 G/50ML
25 INJECTION, SOLUTION INTRAVENOUS
Status: DISCONTINUED | OUTPATIENT
Start: 2024-01-01 | End: 2024-01-01 | Stop reason: HOSPADM

## 2024-01-01 RX ORDER — ASPIRIN 325 MG
325 TABLET ORAL DAILY
Status: DISCONTINUED | OUTPATIENT
Start: 2024-01-01 | End: 2024-01-01 | Stop reason: HOSPADM

## 2024-01-01 RX ORDER — NICOTINE POLACRILEX 4 MG
15 LOZENGE BUCCAL
Status: DISCONTINUED | OUTPATIENT
Start: 2024-01-01 | End: 2024-01-01 | Stop reason: HOSPADM

## 2024-01-01 RX ORDER — ATROPINE SULFATE 0.4 MG/ML
INJECTION, SOLUTION INTRAMUSCULAR; INTRAVENOUS; SUBCUTANEOUS
Status: COMPLETED | OUTPATIENT
Start: 2024-01-01 | End: 2024-01-01

## 2024-01-01 RX ORDER — DOPAMINE HYDROCHLORIDE 160 MG/100ML
2-20 INJECTION, SOLUTION INTRAVENOUS
Status: DISCONTINUED | OUTPATIENT
Start: 2024-01-01 | End: 2024-01-01 | Stop reason: HOSPADM

## 2024-01-01 RX ORDER — MAGNESIUM SULFATE HEPTAHYDRATE 500 MG/ML
INJECTION, SOLUTION INTRAMUSCULAR; INTRAVENOUS
Status: COMPLETED | OUTPATIENT
Start: 2024-01-01 | End: 2024-01-01

## 2024-01-01 RX ORDER — FENTANYL CITRATE 50 UG/ML
25 INJECTION, SOLUTION INTRAMUSCULAR; INTRAVENOUS ONCE
Status: DISCONTINUED | OUTPATIENT
Start: 2024-01-01 | End: 2024-01-01 | Stop reason: HOSPADM

## 2024-01-01 RX ORDER — METHYLPREDNISOLONE SODIUM SUCCINATE 40 MG/ML
40 INJECTION, POWDER, LYOPHILIZED, FOR SOLUTION INTRAMUSCULAR; INTRAVENOUS EVERY 8 HOURS
Status: DISCONTINUED | OUTPATIENT
Start: 2024-01-01 | End: 2024-01-01 | Stop reason: HOSPADM

## 2024-01-01 RX ORDER — FUROSEMIDE 10 MG/ML
80 INJECTION INTRAMUSCULAR; INTRAVENOUS ONCE
Status: COMPLETED | OUTPATIENT
Start: 2024-01-01 | End: 2024-01-01

## 2024-01-01 RX ORDER — ACETAMINOPHEN 650 MG/1
650 SUPPOSITORY RECTAL EVERY 4 HOURS PRN
Status: DISCONTINUED | OUTPATIENT
Start: 2024-01-01 | End: 2024-01-01 | Stop reason: HOSPADM

## 2024-01-01 RX ORDER — AMOXICILLIN 250 MG
2 CAPSULE ORAL 2 TIMES DAILY
Status: DISCONTINUED | OUTPATIENT
Start: 2024-01-01 | End: 2024-01-01 | Stop reason: HOSPADM

## 2024-01-01 RX ORDER — FUROSEMIDE 10 MG/ML
40 INJECTION INTRAMUSCULAR; INTRAVENOUS 3 TIMES DAILY
Status: DISCONTINUED | OUTPATIENT
Start: 2024-01-01 | End: 2024-01-01

## 2024-01-01 RX ORDER — POLYETHYLENE GLYCOL 3350 17 G/17G
17 POWDER, FOR SOLUTION ORAL DAILY PRN
Status: DISCONTINUED | OUTPATIENT
Start: 2024-01-01 | End: 2024-01-01 | Stop reason: HOSPADM

## 2024-01-01 RX ORDER — PANTOPRAZOLE SODIUM 40 MG/1
40 TABLET, DELAYED RELEASE ORAL
Status: DISCONTINUED | OUTPATIENT
Start: 2024-01-01 | End: 2024-01-01 | Stop reason: HOSPADM

## 2024-01-01 RX ORDER — IPRATROPIUM BROMIDE AND ALBUTEROL SULFATE 2.5; .5 MG/3ML; MG/3ML
3 SOLUTION RESPIRATORY (INHALATION)
Status: DISCONTINUED | OUTPATIENT
Start: 2024-01-01 | End: 2024-01-01 | Stop reason: HOSPADM

## 2024-01-01 RX ORDER — METOPROLOL SUCCINATE 25 MG/1
50 TABLET, EXTENDED RELEASE ORAL DAILY
Status: DISCONTINUED | OUTPATIENT
Start: 2024-01-01 | End: 2024-01-01 | Stop reason: HOSPADM

## 2024-01-01 RX ORDER — NITROGLYCERIN 0.4 MG/1
0.4 TABLET SUBLINGUAL
Status: DISCONTINUED | OUTPATIENT
Start: 2024-01-01 | End: 2024-01-01 | Stop reason: HOSPADM

## 2024-01-01 RX ORDER — NOREPINEPHRINE BITARTRATE 0.03 MG/ML
.02-.3 INJECTION, SOLUTION INTRAVENOUS
Status: DISCONTINUED | OUTPATIENT
Start: 2024-01-01 | End: 2024-01-01 | Stop reason: HOSPADM

## 2024-01-01 RX ORDER — MAGNESIUM SULFATE 1 G/100ML
1 INJECTION INTRAVENOUS
Status: ACTIVE | OUTPATIENT
Start: 2024-01-01 | End: 2024-01-01

## 2024-01-01 RX ORDER — IBUPROFEN 600 MG/1
1 TABLET ORAL
Status: DISCONTINUED | OUTPATIENT
Start: 2024-01-01 | End: 2024-01-01 | Stop reason: HOSPADM

## 2024-01-01 RX ORDER — HEPARIN SODIUM 5000 [USP'U]/ML
5000 INJECTION, SOLUTION INTRAVENOUS; SUBCUTANEOUS EVERY 12 HOURS SCHEDULED
Status: DISCONTINUED | OUTPATIENT
Start: 2024-01-01 | End: 2024-01-01 | Stop reason: HOSPADM

## 2024-01-01 RX ORDER — BISACODYL 10 MG
10 SUPPOSITORY, RECTAL RECTAL DAILY PRN
Status: DISCONTINUED | OUTPATIENT
Start: 2024-01-01 | End: 2024-01-01 | Stop reason: HOSPADM

## 2024-01-01 RX ORDER — SODIUM CHLORIDE 0.9 % (FLUSH) 0.9 %
10 SYRINGE (ML) INJECTION EVERY 12 HOURS SCHEDULED
Status: DISCONTINUED | OUTPATIENT
Start: 2024-01-01 | End: 2024-01-01 | Stop reason: HOSPADM

## 2024-01-01 RX ADMIN — EPINEPHRINE 1 MG: 0.1 INJECTION INTRACARDIAC; INTRAVENOUS at 06:09

## 2024-01-01 RX ADMIN — SODIUM BICARBONATE 50 MEQ: 84 INJECTION, SOLUTION INTRAVENOUS at 05:47

## 2024-01-01 RX ADMIN — ATROPINE SULFATE 1 MG: 0.4 INJECTION, SOLUTION INTRAMUSCULAR; INTRAVENOUS; SUBCUTANEOUS at 06:12

## 2024-01-01 RX ADMIN — EPINEPHRINE 1 MG: 0.1 INJECTION INTRACARDIAC; INTRAVENOUS at 05:51

## 2024-01-01 RX ADMIN — SODIUM BICARBONATE 50 MEQ: 84 INJECTION, SOLUTION INTRAVENOUS at 06:09

## 2024-01-01 RX ADMIN — EPINEPHRINE 1 MG: 0.1 INJECTION INTRACARDIAC; INTRAVENOUS at 06:03

## 2024-01-01 RX ADMIN — EPINEPHRINE 1 MG: 0.1 INJECTION INTRACARDIAC; INTRAVENOUS at 05:48

## 2024-01-01 RX ADMIN — INSULIN HUMAN 6 UNITS: 100 INJECTION, SOLUTION PARENTERAL at 02:08

## 2024-01-01 RX ADMIN — CALCIUM CHLORIDE 1 G: 100 INJECTION, SOLUTION INTRAVENOUS at 05:52

## 2024-01-01 RX ADMIN — EPINEPHRINE 1 MG: 0.1 INJECTION INTRACARDIAC; INTRAVENOUS at 05:57

## 2024-01-01 RX ADMIN — AMIODARONE HYDROCHLORIDE 300 MG: 50 INJECTION, SOLUTION INTRAVENOUS at 05:56

## 2024-01-01 RX ADMIN — METHYLPREDNISOLONE SODIUM SUCCINATE 40 MG: 40 INJECTION, POWDER, FOR SOLUTION INTRAMUSCULAR; INTRAVENOUS at 02:08

## 2024-01-01 RX ADMIN — MAGNESIUM SULFATE HEPTAHYDRATE 1 G: 500 INJECTION, SOLUTION INTRAMUSCULAR; INTRAVENOUS at 06:05

## 2024-01-01 RX ADMIN — Medication 0.3 MCG/KG/MIN: at 05:59

## 2024-01-01 RX ADMIN — FUROSEMIDE 80 MG: 10 INJECTION, SOLUTION INTRAMUSCULAR; INTRAVENOUS at 22:58

## 2024-01-01 RX ADMIN — LIDOCAINE HYDROCHLORIDE 100 MG: 20 INJECTION, SOLUTION INTRAVENOUS at 06:06

## 2024-01-01 RX ADMIN — Medication 10 ML: at 02:01

## 2024-01-01 RX ADMIN — EPINEPHRINE 1 MG: 0.1 INJECTION INTRACARDIAC; INTRAVENOUS at 06:00

## 2024-01-16 ENCOUNTER — TRANSCRIBE ORDERS (OUTPATIENT)
Dept: ADMINISTRATIVE | Facility: HOSPITAL | Age: 79
End: 2024-01-16
Payer: MEDICARE

## 2024-01-16 DIAGNOSIS — I70.213 ATHEROSCLEROSIS OF NATIVE ARTERY OF BOTH LOWER EXTREMITIES WITH INTERMITTENT CLAUDICATION: Primary | ICD-10-CM

## 2024-01-26 ENCOUNTER — HOSPITAL ENCOUNTER (OUTPATIENT)
Dept: CT IMAGING | Facility: HOSPITAL | Age: 79
Discharge: HOME | End: 2024-01-26
Admitting: SURGERY
Payer: MEDICARE

## 2024-01-26 DIAGNOSIS — I70.213 ATHEROSCLEROSIS OF NATIVE ARTERY OF BOTH LOWER EXTREMITIES WITH INTERMITTENT CLAUDICATION: ICD-10-CM

## 2024-01-26 PROCEDURE — 25510000001 IOPAMIDOL PER 1 ML: Performed by: SURGERY

## 2024-01-26 PROCEDURE — 75635 CT ANGIO ABDOMINAL ARTERIES: CPT

## 2024-01-26 RX ADMIN — IOPAMIDOL 95 ML: 755 INJECTION, SOLUTION INTRAVENOUS at 13:43

## 2024-01-29 LAB — CREAT BLDA-MCNC: 0.9 MG/DL (ref 0.6–1.3)

## 2024-03-13 DIAGNOSIS — Z95.828 STATUS POST BYPASS GRAFT OF EXTREMITY: ICD-10-CM

## 2024-03-13 DIAGNOSIS — I73.9 PERIPHERAL VASCULAR DISEASE, UNSPECIFIED: Primary | ICD-10-CM

## 2024-04-13 PROBLEM — J96.01 ACUTE RESPIRATORY FAILURE WITH HYPOXIA: Status: ACTIVE | Noted: 2024-01-01

## 2024-04-14 NOTE — ED NOTES
"Pt arrives from home via EMS.    EMS states \"Pt called out for SOA for a week thought it was allergies. Initially he had expiratory wheezes 3 min out he began being anxious, diaphoretic, sats dropped to the 70's we bagged him and tried cpap. Pt unable to tolerate we placed on non rebreather. Pt sating at 88% on non rebreather.\"  "

## 2024-04-14 NOTE — PROGRESS NOTES
D-dimer mildly elevated at 1.25.    Patient denies recent unilateral leg swelling, redness or discomfort/cramping.  No history of DVT/PE  Reports compliance with anticoagulation.  Low suspicion for PE causing hypoxia-suspect hypoxia is more closely related to fluid overload and COPD exacerbation.    Lactic acid and WBC continuing to worsen- STAT CT chest and abdomen/pelvis ordered. Ceftriaxone ordered for sepsis, blood cultures ordered x 2.

## 2024-04-14 NOTE — CODE DOCUMENTATION
Please refer to the nursing note for more details about the medication given and the timing.  Patient went into PEA arrest  CPR was initiated  End-tidal CO2 was in the high teens low 20s at all times  No ROSC was obtained  Patient was started on the epi drip and dopamine rate was maxed out during the code  Patient received several rounds of epinephrine  He received total of 2 rounds of bicarbonate  When his rhythm flipped into V-fib, the patient was loaded with amiodarone  Lidocaine was also given because amiodarone was not available promptly for the second dose  Patient had a low magnesium and he had some torsades with the V-fib episode so he was given magnesium  With his acidosis and high potassium on presentation patient was also given calcium chloride  Toward the end of the code, the patient was having some PA with significant bradycardia, we were not certain whether he had a pulse for that or not so 1 dose of atropine was given as well.  Overall patient has no response to all measures provided and patient was pronounced dead at 6:14 AM after almost 30 minutes of resuscitation

## 2024-04-14 NOTE — H&P
Group: [facility]        HISTORY AND PHYSICAL    Patient Identification:  Niraj Hassan  78 y.o.  male  1945  [id number]            CC: shortness of breath, hypoxia    History of Present Illness:  Niraj Hassan is a 78-year-old male with a past medical history of coronary artery disease, diabetes mellitus (oral medication controlled), GERD, hyperlipidemia, hypertension, and peripheral arterial disease.  He is on chronic anticoagulation with Eliquis, last dose evening of arrival to the ED.  He is a former smoker with 50 pack years, quit 2022.  Most recent echocardiogram from February 2022 shows an LVEF of 47.3%.    Patient presented to the emergency department on 4/13/2024 with complaints of acute onset of shortness of breath. He reports that he just got back in town from a trip to [place] about a week ago and began developing what he felt were allergy-like symptoms with congestion, rhinorrhea, and nonproductive cough.  Shortness of breath has developed over the past several days-worse with exertion and while laying flat.  Denied fever, chills, or sick contacts.  Denied swelling of lower extremities.  Patient had plan to go to urgent treatment center on Sunday morning when he began to feel exceptionally short of breath and called EMS.  Upon EMS arrival patient was described as having severe respiratory distress with tripoding, SpO2 was 70% on room air.  Patient received breathing treatment and was tried on BiPAP prior to ED arrival without improvement in symptoms.  Upon arrival to the ED, patient's SpO2 was 94% on a 15 L nonrebreather.  Patient was tachycardic with a heart rate of 118.    ED evaluation included: proBNP 10,180, WBC 12.19, lactate 4.8, high-sensitivity troponin 888, serum bicarbonate 16.0, anion gap 17.0.  Respiratory viral panel was negative.  Chest x-ray showed bilateral alveolar interstitial infiltrates, favored to represent edema. Patient received 80 mg IV Lasix in the  emergency department with able to be weaned to high flow nasal cannula.  He is admitted to the intensive care unit for acute respiratory failure with hypoxia.    Review of Systems:  CONSTITUTIONAL:  Denies fevers or chills, +congestion, rhinorrhea  EYE:  No new vision changes  EAR:  No change in hearing  CARDIAC:  No chest pain, palpitations, or chest tightness  PULMONARY:  + shortness of breath, cough- no wheezing  GI:  No diarrhea, hematemesis or hematochezia,  RENAL:  No dysuria or urinary frequency  MUSCULOSKELETAL:  No musculoskeletal complaints  ENDOCRINE:  No heat or cold intolerance  INTEGUMENTARY: No skin rashes  NEUROLOGICAL:  No dizziness or confusion.  No seizure activity  PSYCHIATRIC:  No new anxiety or depression  12 system review of systems performed and all else negative     Past Medical History:  Past Medical History:   Diagnosis Date    AAA (abdominal aortic aneurysm)     Allergic     Had for years. Mold is the main.    Aneurysm     Abreu esophagus     CAD (coronary artery disease)     DDD (degenerative disc disease), lumbar 05/14/2021    Diabetes mellitus     Enlarged prostate     GERD (gastroesophageal reflux disease)     Hyperlipidemia     Hypertension     Lumbar neuritis 05/14/2021    Myocardial infarction 1996, 2015    Neuropathy     RT FOOT    Osteoarthritis     PVD (peripheral vascular disease)     Sciatic nerve pain     Wound of right leg     STATED SIZE OF QUARTER, DUE TO BLOOD FLOW       Past Surgical History:  Past Surgical History:   Procedure Laterality Date    ANGIOPLASTY ILIAC ARTERY Right 1/19/2022    Procedure: RIGHT ILIAC STENET PLACEMENT;  Surgeon: Dharmesh Collazo MD;  Location: [facility] 18/19;  Service: Vascular;  Laterality: Right;    ARTERIAL BYPASS SURGERY      RIGHT LEG    CARDIAC CATHETERIZATION      showed total occlusion of the RCA, mild left main disease, and moderate disease of the circumflex.    COLONOSCOPY      CORONARY ANGIOPLASTY WITH STENT PLACEMENT    "   angioplasty and stent placement to the circumflex    ENDOSCOPY      ENDOSCOPY N/A 10/13/2021    Procedure: ESOPHAGOGASTRODUODENOSCOPY WITH BIOPSIES;  Surgeon: Gildardo Whelan MD;  Location: [facility] [place];  Service: Gastroenterology;  Laterality: N/A;  IRREGULAR Z LINE    FEMORAL POPLITEAL BYPASS Right 3/12/2021    Procedure: RIGHT LEG BYPASS GRAFT REVISON;  Surgeon: Dharmesh Collazo MD;  Location: [facility] MAIN OR;  Service: Vascular;  Laterality: Right;    FEMORAL POPLITEAL BYPASS Right 2022    Procedure: RIGHT FEMORAL BELOW KNEE  POPLITEAL BYPASS;  Surgeon: Dharmesh Collazo MD;  Location: [facility] MAIN OR;  Service: Vascular;  Laterality: Right;        Home Meds:  (Not in a hospital admission)      Allergies:  No Known Allergies    Social History:   Social History     Socioeconomic History    Marital status:    Tobacco Use    Smoking status: Former     Current packs/day: 0.00     Average packs/day: 1 pack/day for 50.0 years (50.0 ttl pk-yrs)     Types: Cigarettes     Start date: 1/10/1972     Quit date: 1/10/2022     Years since quittin.2    Smokeless tobacco: Never    Tobacco comments:     caffeine use   Vaping Use    Vaping status: Never Used   Substance and Sexual Activity    Alcohol use: Yes     Comment: Occasional social    Drug use: No    Sexual activity: Not Currently     Partners: Female       Family History:  Family History   Problem Relation Age of Onset    Heart attack Mother     Cancer Mother         Passes in     Heart disease Mother         Five heart attacks. Pacemaker    Heart attack Father     Heart disease Father         Passed from heart attack in     Colon polyps Neg Hx     Colon cancer Neg Hx     Malig Hyperthermia Neg Hx        Physical Exam:  /74   Pulse 102   Temp 96.1 °F (35.6 °C) (Tympanic)   Resp 24   Ht 172.7 cm (68\")   Wt 75 kg (165 lb 5.5 oz)   SpO2 97%   BMI 25.14 kg/m²  Body mass index is 25.14 kg/m². 97% 75 kg (165 lb 5.5 " oz)    Constitutional: Elderly male patient, appears older than stated age, pale, tachypneic but able to hold conversation  Head: - NCAT  Eyes: No pallor, Anicteric conjunctiva, EOMI.  ENMT:  Mallampati 3, no oral thrush. Dry MM.   NECK: Trachea midline, No thyromegaly, no palpable cervical lymphadenopathy  Heart: Tachycardic rate, regular rhythm, no murmur. No pedal edema   Lungs: MAURICE +, bibasilar crackles, diminished, some wheezing  Abdomen: Soft. No tenderness, guarding or rigidity. No palpable masses  Extremities: Extremities pale and well perfused. No cyanosis/ clubbing  Neuro: Conscious, answers appropriately, no gross focal neuro deficits  Psych: Mood and affect appropriate    PPE recommended per [facility] operated by [facility] infectious disease Isolation protocol for the current clinical scenario(as mentioned below) was followed.      LABS:  COVID19   Date Value Ref Range Status   04/13/2024 Not Detected Not Detected - Ref. Range Final       Lab Results   Component Value Date    CALCIUM 8.7 04/13/2024     Results from last 7 days   Lab Units 04/13/24  2345 04/13/24 2242 04/13/24  2238   SODIUM mmol/L 140  --   --    POTASSIUM mmol/L 4.9  --   --    CHLORIDE mmol/L 107  --   --    CO2 mmol/L 16.0*  --   --    BUN mg/dL 24*  --   --    CREATININE mg/dL 1.13 1.00  --    GLUCOSE mg/dL 322*  --   --    CALCIUM mg/dL 8.7  --   --    WBC 10*3/mm3  --   --  12.19*   HEMOGLOBIN g/dL  --   --  13.1   HEMOGLOBIN, POC g/dL  --  14.7  --    PLATELETS 10*3/mm3  --   --  352   ALT (SGPT) U/L 18  --   --    AST (SGOT) U/L 16  --   --    PROBNP pg/mL  --   --  10,180.0*     Lab Results   Component Value Date    TROPONINT 888 (C) 04/13/2024     Results from last 7 days   Lab Units 04/13/24  2345   HSTROP T ng/L 888*         Results from last 7 days   Lab Units 04/13/24  2242   LACTATE mmol/L 4.8*     Results from last 7 days   Lab Units 04/13/24  2242   PH, ARTERIAL pH units 7.264*   PCO2, ARTERIAL mm Hg 35.5   PO2 ART mm Hg 92.9   O2  SATURATION ART % 96.0   FLOW RATE lpm 15.0000   MODALITY  NRB     Results from last 7 days   Lab Units 04/13/24  2242   ADENOVIRUS DETECTION BY PCR  Not Detected   CORONAVIRUS 229E  Not Detected   CORONAVIRUS HKU1  Not Detected   CORONAVIRUS NL63  Not Detected   CORONAVIRUS OC43  Not Detected   HUMAN METAPNEUMOVIRUS  Not Detected   HUMAN RHINOVIRUS/ENTEROVIRUS  Not Detected   INFLUENZA B PCR  Not Detected   PARAINFLUENZA 1  Not Detected   PARAINFLUENZA VIRUS 2  Not Detected   PARAINFLUENZA VIRUS 3  Not Detected   PARAINFLUENZA VIRUS 4  Not Detected   BORDETELLA PERTUSSIS PCR  Not Detected   BORDETELLA PARAPERTUSSIS PCR  Not Detected   CHLAMYDOPHILA PNEUMONIAE PCR  Not Detected   MYCOPLAMA PNEUMO PCR  Not Detected   RSV, PCR  Not Detected             Lab Results   Component Value Date    TSH 1.890 11/02/2021     Estimated Creatinine Clearance: 58.1 mL/min (by C-G formula based on SCr of 1.13 mg/dL).         Imaging: I personally visualized the images of scans/x-rays performed within last 3 days.      Assessment:  Acute respiratory failure with hypoxia  High anion gap metabolic acidosis/lactic acidosis  Elevated troponin with history of coronary artery disease  COPD, suspected, acute exacerbation  Pulmonary edema  Diabetes mellitus  GERD  Hyperlipidemia  Hypertension  Peripheral arterial disease  Infrarenal abdominal aortic aneurysm, stable (per CT from January 2024)  Former tobacco abuse, 50 pack years  Chronic anticoagulation with Eliquis      Recommendations:  Acute respiratory failure with hypoxia  Admitted to the intensive care unit, still requiring high flow nasal cannula at 15 L/min to maintain SpO2 greater 90%.  Wean supplemental oxygen as tolerated to maintain SpO2 88 to 92%.    High anion gap metabolic acidosis/lactic acidosis  Lactic acid 4.8-suspect related to respiratory distress-repeat ordered.    Elevated troponin with history of coronary artery disease  Troponin 888-trend.  EKG reviewed showing sinus  tachycardia, no evidence of acute infarct.  Cardiology consult for elevated troponin, known diagnosis of CAD with previous stenting.  Follows with Dr. aMnriquez in the outpatient setting.  (Patient is currently on 325 mg aspirin daily and Eliquis)    COPD, suspected, acute exacerbation  Former tobacco abuse, 50 pack years  Significant smoking history- no formal diagnosis of COPD.  Denies shortness of breath at baseline, does not utilize oxygen, not prescribed.  Scheduled bronchodilator therapy- Duonebs ordered four times daily.  IV Solu-medrol 40mg q 8 hours    Pulmonary edema/ suspected congestive heart failure exacerbation  ECHO from February 2022 showed LVEF of 47.3% with mildly decreased left ventricular systolic function, and grade 1 impaired diastolic function.  Chest x-ray showed evidence of bilateral alveolar interstitial infiltrates, suspect pulmonary edema.  Patient received 80 mg of IV Lasix with improvement in hypoxemia in the emergency department-continue 40 mg IV twice daily for now.  Monitor strict I's and O's.    Diabetes mellitus  Home medication: Glipizide, metformin-currently on hold in favor of subcutaneous insulin.  Suspect hyperglycemia will worsen with steroids.    GERD  Home medication: Lansoprazole, utilizing pantoprazole.    Hyperlipidemia  Home medication: 40 mg atorvastatin, continue.    Hypertension  Home medication: Amlodipine 5 mg, metoprolol succinate 50 mg, losartan-hydrochlorothiazide 40-25 mg daily.    Continue metoprolol.    Peripheral arterial disease  Infrarenal abdominal aortic aneurysm, stable (per CT from January 2024)  Followed by vascular surgery-history of angioplasty of the iliac artery, arterial bypass surgery, and femoropopliteal bypass.    Chronic anticoagulation with Eliquis  Appears Eliquis was initiated in February 2022 after vascular surgery took patient for right femoral to below-knee popliteal artery bypass.   Hold in case patient will require invasive cardiac  intervention with elevated troponin-last dose was 4/13 in the evening.    Full code  NPO, okay for sips with meds  Heparin subcu for VTE prophylaxis    Patient was placed in face mask upon entering room and kept mask on throughout our encounter. I wore full protective equipment throughout this patient encounter including a face mask, gown and gloves. Hand hygiene was performed before donning protective equipment and after removal when leaving the room.    Nyasia Faustin, APRN  4/14/2024  00:36 EDT      Much of this encounter note is an electronic transcription/translation of spoken language to printed text using Dragon Software.

## 2024-04-14 NOTE — ED NOTES
Nursing report ED to floor  Niraj Hassan  78 y.o.  male    HPI :  HPI (Adult)  Stated Reason for Visit: SOA  History Obtained From: patient    Chief Complaint  Chief Complaint   Patient presents with    Shortness of Breath       Admitting doctor:   Cayden Pepe MD    Admitting diagnosis:   The primary encounter diagnosis was Acute respiratory failure with hypoxia. Diagnoses of Acute congestive heart failure, unspecified heart failure type, Ischemic cardiomyopathy, and Coagulopathy: Eliquis induced were also pertinent to this visit.    Code status:   Current Code Status       Date Active Code Status Order ID Comments User Context       4/14/2024 0041 CPR (Attempt to Resuscitate) [id number]  Nyasia Faustin, [facility] ED        Question Answer    Code Status (Patient has no pulse and is not breathing) CPR (Attempt to Resuscitate)    Medical Interventions (Patient has pulse or is breathing) Full Support                    Allergies:   Patient has no known allergies.    Isolation:   No active isolations    Intake and Output  No intake or output data in the 24 hours ending 04/14/24 0046    Weight:       04/13/24 2234   Weight: 75 kg (165 lb 5.5 oz)       Most recent vitals:   Vitals:    04/13/24 2246 04/13/24 2250 04/13/24 2340 04/14/24 0031   BP: 133/100  132/92 111/74   Pulse: (!) 122  115 102   Resp:  24     Temp:       TempSrc:       SpO2: 97%  94% 97%   Weight:       Height:           Active LDAs/IV Access:   Lines, Drains & Airways       Active LDAs       Name Placement date Placement time Site Days    Peripheral IV 04/13/24 2233 Anterior;Distal;Left;Upper Arm 04/13/24 2233  Arm  less than 1    Peripheral IV 04/13/24 2241 Right Antecubital 04/13/24 2241  Antecubital  less than 1                    Labs (abnormal labs have a star):   Labs Reviewed   COMPREHENSIVE METABOLIC PANEL - Abnormal; Notable for the following components:       Result Value    Glucose 322 (*)     BUN 24 (*)     CO2 16.0 (*)     Anion Gap  17.0 (*)     All other components within normal limits    Narrative:     GFR Normal >60  Chronic Kidney Disease <60  Kidney Failure <15    The GFR formula is only valid for adults with stable renal function between ages 18 and 70.   BNP (IN-HOUSE) - Abnormal; Notable for the following components:    proBNP 10,180.0 (*)     All other components within normal limits    Narrative:     This assay is used as an aid in the diagnosis of individuals suspected of having heart failure. It can be used as an aid in the diagnosis of acute decompensated heart failure (ADHF) in patients presenting with signs and symptoms of ADHF to the emergency department (ED). In addition, NT-proBNP of <300 pg/mL indicates ADHF is not likely.    Age Range Result Interpretation  NT-proBNP Concentration (pg/mL:      <50             Positive            >450                   Gray                 300-450                    Negative             <300    50-75           Positive            >900                  Gray                300-900                  Negative            <300      >75             Positive            >1800                  Gray                300-1800                  Negative            <300   SINGLE HS TROPONIN T - Abnormal; Notable for the following components:    HS Troponin T 888 (*)     All other components within normal limits    Narrative:     High Sensitive Troponin T Reference Range:  <14.0 ng/L- Negative Female for AMI  <22.0 ng/L- Negative Male for AMI  >=14 - Abnormal Female indicating possible myocardial injury.  >=22 - Abnormal Male indicating possible myocardial injury.   Clinicians would have to utilize clinical acumen, EKG, Troponin, and serial changes to determine if it is an Acute Myocardial Infarction or myocardial injury due to an underlying chronic condition.        CBC WITH AUTO DIFFERENTIAL - Abnormal; Notable for the following components:    WBC 12.19 (*)     RDW 12.2 (*)     Lymphocytes, Absolute 4.46 (*)      Monocytes, Absolute 0.92 (*)     All other components within normal limits   BLOOD GAS, ARTERIAL - Abnormal; Notable for the following components:    pH, Arterial 7.264 (*)     HCO3, Arterial 16.1 (*)     Base Excess, Arterial -10.0 (*)     All other components within normal limits   POC LACTATE - Abnormal; Notable for the following components:    Lactate 4.8 (*)     All other components within normal limits   POC CHEM PANEL - Abnormal; Notable for the following components:    Glucose 332 (*)     Ionized Calcium 1.21 (*)     Chloride 111 (*)     CO2 Content 16.1 (*)     All other components within normal limits   RESPIRATORY PANEL PCR W/ COVID-19 (SARS-COV-2), NP SWAB IN UTM/VTP, 2 HR TAT - Normal    Narrative:     In the setting of a positive respiratory panel with a viral infection PLUS a negative procalcitonin without other underlying concern for bacterial infection, consider observing off antibiotics or discontinuation of antibiotics and continue supportive care. If the respiratory panel is positive for atypical bacterial infection (Bordetella pertussis, Chlamydophila pneumoniae, or Mycoplasma pneumoniae), consider antibiotic de-escalation to target atypical bacterial infection.   HGB & HCT POC - Normal   RAINBOW DRAW    Narrative:     The following orders were created for panel order [place] Draw.  Procedure                               Abnormality         Status                     ---------                               -----------         ------                     Green Top (Gel)[[id number]]                                  Final result               Lavender Top[[id number]]                                     Final result               Gold Top - SST[[id number]]                                   Final result               Light Blue Top[[id number]]                                   Final result                 Please view results for these tests on the individual orders.   BLOOD GAS, ARTERIAL   LACTIC  ACID, REFLEX   HIGH SENSITIVITIY TROPONIN T 2HR   D-DIMER, QUANTITATIVE   BASIC METABOLIC PANEL   MAGNESIUM   URINE DRUG SCREEN   CBC WITH AUTO DIFFERENTIAL   POC ELECTROLYTE PANEL   CBC AND DIFFERENTIAL    Narrative:     The following orders were created for panel order CBC & Differential.  Procedure                               Abnormality         Status                     ---------                               -----------         ------                     CBC Auto Differential[[id number]]        Abnormal            Final result                 Please view results for these tests on the individual orders.   GREEN TOP   LAVENDER TOP   GOLD TOP - SST   LIGHT BLUE TOP   CBC AND DIFFERENTIAL    Narrative:     The following orders were created for panel order CBC & Differential.  Procedure                               Abnormality         Status                     ---------                               -----------         ------                     CBC Auto Differential[[id number]]                                                         Please view results for these tests on the individual orders.       EKG:   ECG 12 Lead ED Triage Standing Order; SOA   Preliminary Result   HEART RATE= 123  bpm   RR Interval= 488  ms   NM Interval= 118  ms   P Horizontal Axis= -55  deg   P Front Axis= 0  deg   QRSD Interval= 116  ms   QT Interval= 356  ms   QTcB= 510  ms   QRS Axis= 40  deg   T Wave Axis= 98  deg   - ABNORMAL ECG -   Sinus tachycardia   Atrial premature complex   Probable left atrial enlargement   Nonspecific intraventricular conduction delay   Inferior infarct, old   Anterior infarct, old   Baseline wander in lead(s) III   Electronically Signed By:    Date and Time of Study: 2024-04-13 22:35:48      ECG 12 Lead Dyspnea    (Results Pending)       Meds given in ED:   Medications   sodium chloride 0.9 % flush 10 mL (has no administration in time range)   nitroglycerin (NITROSTAT) SL tablet 0.4 mg (has no  administration in time range)   sodium chloride 0.9 % flush 10 mL (has no administration in time range)   sodium chloride 0.9 % flush 10 mL (has no administration in time range)   sodium chloride 0.9 % infusion 40 mL (has no administration in time range)   sennosides-docusate (PERICOLACE) 8.6-50 MG per tablet 2 tablet (has no administration in time range)     And   polyethylene glycol (MIRALAX) packet 17 g (has no administration in time range)     And   bisacodyl (DULCOLAX) EC tablet 5 mg (has no administration in time range)     And   bisacodyl (DULCOLAX) suppository 10 mg (has no administration in time range)   heparin (porcine) 5000 UNIT/ML injection 5,000 Units (has no administration in time range)   Potassium Replacement - Follow Nurse / BPA Driven Protocol (has no administration in time range)   Magnesium Low Dose Replacement - Follow Nurse / BPA Driven Protocol (has no administration in time range)   Phosphorus Replacement - Follow Nurse / BPA Driven Protocol (has no administration in time range)   Calcium Replacement - Follow Nurse / BPA Driven Protocol (has no administration in time range)   acetaminophen (TYLENOL) tablet 650 mg (has no administration in time range)     Or   acetaminophen (TYLENOL) suppository 650 mg (has no administration in time range)   ondansetron (ZOFRAN) injection 4 mg (has no administration in time range)   furosemide (LASIX) injection 40 mg (has no administration in time range)   aspirin tablet 325 mg (has no administration in time range)   atorvastatin (LIPITOR) tablet 40 mg (has no administration in time range)   pantoprazole (PROTONIX) EC tablet 40 mg (has no administration in time range)   metoprolol succinate XL (TOPROL-XL) 24 hr tablet 50 mg (has no administration in time range)   tamsulosin (FLOMAX) 24 hr capsule 0.4 mg (has no administration in time range)   furosemide (LASIX) injection 80 mg (80 mg Intravenous Given 4/13/24 0237)       Imaging results:  XR Chest 1  View    Result Date: 2024  Bilateral alveolar and interstitial infiltrates, favored to represent edema.  This report was finalized on 2024 10:43 PM by Dr. Rochelle Harrell M.D on Workstation: [id number]       Ambulatory status:   - assist    Social issues:   Social History     Socioeconomic History    Marital status:    Tobacco Use    Smoking status: Former     Current packs/day: 0.00     Average packs/day: 1 pack/day for 50.0 years (50.0 ttl pk-yrs)     Types: Cigarettes     Start date: 1/10/1972     Quit date: 1/10/2022     Years since quittin.2    Smokeless tobacco: Never    Tobacco comments:     caffeine use   Vaping Use    Vaping status: Never Used   Substance and Sexual Activity    Alcohol use: Yes     Comment: Occasional social    Drug use: No    Sexual activity: Not Currently     Partners: Female       Peripheral Neurovascular  Peripheral Neurovascular (Adult)  Peripheral Neurovascular WDL: WDL    Neuro Cognitive  Neuro Cognitive (Adult)  Cognitive/Neuro/Behavioral WDL: WDL, mood/behavior, orientation  Orientation: oriented x 4  Mood/Behavior: calm, cooperative    Learning  Learning Assessment (Adult)  Learning Readiness and Ability: no barriers identified  Education Provided  Person Taught: patient  Teaching Method: verbal instruction  Education Outcome Evaluation: verbalizes understanding    Respiratory  Respiratory WDL  Respiratory WDL: all, cough, .WDL except  Rhythm/Pattern, Respiratory: tachypneic, labored  Expansion/Accessory Muscles/Retractions: abdominal muscle use  Cough Frequency: infrequent  Breath Sounds  Breath Sounds: All Fields  All Lung Fields Breath Sounds: diminished    Abdominal Pain       Pain Assessments  Pain (Adult)  (0-10) Pain Rating: Rest: 0  (0-10) Pain Rating: Activity: 0    NIH Stroke Scale       Senait Hussein RN  24 00:46 EDT

## 2024-04-14 NOTE — ED PROVIDER NOTES
EMERGENCY DEPARTMENT ENCOUNTER    Room Number:  N328/1  Date of encounter:  4/14/2024  PCP: Arben Flores Jr.,   Historian: EMS and patient  Relevant information and history provided by sources other than the patient will be included below and in the ED Course.  Review of pertinent past medical records may also be included in record below and ED Course.    HPI:  Chief Complaint: Shortness of breath  A complete HPI/ROS/PMH/PSH/SH/FH are unobtainable due to: Patient is a vague historian.  This patient called the paramedics today because of shortness of breath.  He has had shortness of breath for a little over a week.  It has gradually been worsening.  He states that initially thought it was allergies.  The shortness of breath was worse at night and especially worse with exertion.  He is aware that he has had a history of 2 heart attacks.  1 heart attack he had no symptoms in the other heart at pack he had he did not have pain.  He has never had a blood clot that he is aware of.  He is on Eliquis and his last dose of Eliquis was this morning.  He reports no history of heart failure that he is aware of.  He is not aware of any weak heart muscle or cardiomyopathy.  He has been taking all of his medicines.  Denies any fever.  Has had a mild cough.  Denies any new swelling to his lower extremities.  Denies any chest pain currently or prior to arrival here.  Denies any abdominal pain.  He denies any history of any lung problems.  No history of emphysema or COPD.  He does not smoke.  In conversation with EMS he was in severe respiratory distress when they arrived.  Patient was tripoding.  Oxygen saturation was in the 70 percentile on room air.  The initially gave him a breathing treatment and felt as if he was improving.  He was also on oxygen.  He then became diaphoretic and appeared weaker.  They stopped the breathing treatment.  They placed him on 100% nonrebreather.  They got an oxygen saturation in the upper 80  percentile range.  They unsuccessfully tried CPAP prior to arrival here.    Previous Episodes: No  Current Symptoms: He is significantly short of breath.  He is doing better on the 100% nonrebreather.    MEDICAL HISTORY REVIEWED  I reviewed an echo from February 2014 this is from [place] [facility].  He had an ejection fraction of 47% to 44% his left ventricular systolic function was mildly decreased.  Left ventricular wall thickness was consistent with moderate concentric hypertrophy had some impaired diastolic relaxation and function.  Left ventricle wall segments are hypokinetic.  I looked a stress test from 3/2/2021 myocardial perfusion images indicate a medium sized infarct located in the lateral wall with no significant ischemia noted.  Ejection fraction on that was 40%.    I reviewed the office note from Dr. Manriquez from April 27, 2023 this is a gentleman that has a history of coronary artery disease and peripheral arterial disease and history of femoropopliteal bypass to his right lower extremity in the distant past.  Also has hyperlipidemia.  And hypertension.  He is on Eliquis 5 mg at that time as well as other cardiac medicines and hyperlipidemic medicines..  As mentioned above he does have a history of ischemic cardiomyopathy.  I do not see any definitive history of atrial fibrillation.    PAST MEDICAL HISTORY  Active Ambulatory Problems     Diagnosis Date Noted    Essential hypertension 03/21/2016    Mixed hyperlipidemia 03/21/2016    Type 2 diabetes mellitus without complication, without long-term current use of insulin 04/07/2017    Slow urinary stream 04/07/2017    Sciatica of right side 02/14/2018    Medicare annual wellness visit, initial 08/20/2018    Abnormal ankle brachial index (CAT) 01/24/2019    Claudication 01/24/2019    CAD (coronary artery disease) 08/23/2019    GERD (gastroesophageal reflux disease) 08/23/2019    Type 2 diabetes mellitus with diabetic neuropathy, without long-term  current use of insulin 02/28/2020    Vitiligo 07/13/2020    Back pain 02/15/2021    Numbness of right foot 02/15/2021    PVD (peripheral vascular disease) 03/12/2021    DDD (degenerative disc disease), lumbar 05/14/2021    Lumbar neuritis 05/14/2021    Facet arthropathy, lumbar 05/24/2021    Abreu's esophagus without dysplasia 07/26/2021    PAD (peripheral artery disease) 02/25/2022    Age-related cataract of both eyes 04/03/2017    Dry eyes 07/07/2021     Resolved Ambulatory Problems     Diagnosis Date Noted    No Resolved Ambulatory Problems     Past Medical History:   Diagnosis Date    AAA (abdominal aortic aneurysm)     Allergic     Aneurysm     Abreu esophagus     Diabetes mellitus     Enlarged prostate     Hyperlipidemia     Hypertension     Myocardial infarction 1996, 2015    Neuropathy     Osteoarthritis     Sciatic nerve pain     Wound of right leg          PAST SURGICAL HISTORY  Past Surgical History:   Procedure Laterality Date    ANGIOPLASTY ILIAC ARTERY Right 1/19/2022    Procedure: RIGHT ILIAC STENET PLACEMENT;  Surgeon: Dharmesh Collazo MD;  Location: [facility] 18/19;  Service: Vascular;  Laterality: Right;    ARTERIAL BYPASS SURGERY      RIGHT LEG    CARDIAC CATHETERIZATION      showed total occlusion of the RCA, mild left main disease, and moderate disease of the circumflex.    COLONOSCOPY      CORONARY ANGIOPLASTY WITH STENT PLACEMENT      angioplasty and stent placement to the circumflex    ENDOSCOPY      ENDOSCOPY N/A 10/13/2021    Procedure: ESOPHAGOGASTRODUODENOSCOPY WITH BIOPSIES;  Surgeon: Gildardo Whelan MD;  Location: [facility] OR;  Service: Gastroenterology;  Laterality: N/A;  IRREGULAR Z LINE    FEMORAL POPLITEAL BYPASS Right 3/12/2021    Procedure: RIGHT LEG BYPASS GRAFT REVISON;  Surgeon: Dharmesh Collazo MD;  Location: [facility];  Service: Vascular;  Laterality: Right;    FEMORAL POPLITEAL BYPASS Right 2/25/2022    Procedure: RIGHT FEMORAL BELOW KNEE  POPLITEAL  BYPASS;  Surgeon: Dharmesh Collazo MD;  Location: [facility] OR;  Service: Vascular;  Laterality: Right;         FAMILY HISTORY  Family History   Problem Relation Age of Onset    Heart attack Mother     Cancer Mother         Passes in     Heart disease Mother         Five heart attacks. Pacemaker    Heart attack Father     Heart disease Father         Passed from heart attack in     Colon polyps Neg Hx     Colon cancer Neg Hx     Malig Hyperthermia Neg Hx          SOCIAL HISTORY  Social History     Socioeconomic History    Marital status:    Tobacco Use    Smoking status: Former     Current packs/day: 0.00     Average packs/day: 1 pack/day for 50.0 years (50.0 ttl pk-yrs)     Types: Cigarettes     Start date: 1/10/1972     Quit date: 1/10/2022     Years since quittin.2    Smokeless tobacco: Never    Tobacco comments:     caffeine use   Vaping Use    Vaping status: Never Used   Substance and Sexual Activity    Alcohol use: Yes     Comment: Occasional social    Drug use: No    Sexual activity: Not Currently     Partners: Female         ALLERGIES  Patient has no known allergies.        REVIEW OF SYSTEMS  Review of Systems     All systems reviewed and negative except for those discussed in HPI.       PHYSICAL EXAM    I have reviewed the triage vital signs and nursing notes.    ED Triage Vitals   Temp Heart Rate Resp BP SpO2   24   96.1 °F (35.6 °C) 118 28 (!) 149/102 94 %      Temp src Heart Rate Source Patient Position BP Location FiO2 (%)   24 --   Tympanic Monitor Lying Right arm        GENERAL: This is an elderly male.  He is in moderate to severe respiratory distress.  .Vital signs on my initial evaluation his O2 sat on the 100% nonrebreather is about 95%.  He is tachycardic with his heart rate in the 120s.  It is an irregular rhythm on the monitor.  It is not significantly  prolonged QRS.  He is afebrile.  Blood pressure is mildly elevated  HENT: nares patent  Head/neck/ face are symmetric without gross deformity, signs of trauma, or swelling  EYES: no scleral icterus, no conjunctival pallor.  NECK: Supple, no meningismus  CV: Tachycardia with irregular regular rhythm.  Distant heart sounds.  No obvious murmur appreciated.  RESPIRATORY: Moderate to severe respiratory distress with tachypnea.  He is able to speak in short phrases.  He has decreased breath sounds in the bases and some crackles.  He has mild expiratory wheeze.  ABDOMEN: soft and nontender.  MUSCULOSKELETAL: no deformity.  No edema.  Intact distal pulses that are equal strong and symmetric.  No cyanosis or coolness  NEURO: alert and appropriate, moves all extremities, follows commands.  Generalized weakness but.  But no focal motor or sensory changes  SKIN: warm, dry    Vital signs and nursing notes reviewed.        LAB RESULTS  Recent Results (from the past 24 hour(s))   ECG 12 Lead ED Triage Standing Order; SOA    Collection Time: 04/13/24 10:35 PM   Result Value Ref Range    QT Interval 356 ms    QTC Interval 510 ms   BNP    Collection Time: 04/13/24 10:38 PM    Specimen: Arm, Right; Blood   Result Value Ref Range    proBNP 10,180.0 (H) 0.0 - 1,800.0 pg/mL   Green Top (Gel)    Collection Time: 04/13/24 10:38 PM   Result Value Ref Range    Extra Tube Hold for add-ons.    Lavender Top    Collection Time: 04/13/24 10:38 PM   Result Value Ref Range    Extra Tube hold for add-on    Gold Top - SST    Collection Time: 04/13/24 10:38 PM   Result Value Ref Range    Extra Tube Hold for add-ons.    Light Blue Top    Collection Time: 04/13/24 10:38 PM   Result Value Ref Range    Extra Tube Hold for add-ons.    CBC Auto Differential    Collection Time: 04/13/24 10:38 PM    Specimen: Arm, Right; Blood   Result Value Ref Range    WBC 12.19 (H) 3.40 - 10.80 10*3/mm3    RBC 4.31 4.14 - 5.80 10*6/mm3    Hemoglobin 13.1 13.0 - 17.7 g/dL     Hematocrit 41.4 37.5 - 51.0 %    MCV 96.1 79.0 - 97.0 fL    MCH 30.4 26.6 - 33.0 pg    MCHC 31.6 31.5 - 35.7 g/dL    RDW 12.2 (L) 12.3 - 15.4 %    RDW-SD 43.7 37.0 - 54.0 fl    MPV 11.2 6.0 - 12.0 fL    Platelets 352 140 - 450 10*3/mm3    Neutrophil % 53.2 42.7 - 76.0 %    Lymphocyte % 36.6 19.6 - 45.3 %    Monocyte % 7.5 5.0 - 12.0 %    Eosinophil % 2.1 0.3 - 6.2 %    Basophil % 0.3 0.0 - 1.5 %    Immature Grans % 0.3 0.0 - 0.5 %    Neutrophils, Absolute 6.47 1.70 - 7.00 10*3/mm3    Lymphocytes, Absolute 4.46 (H) 0.70 - 3.10 10*3/mm3    Monocytes, Absolute 0.92 (H) 0.10 - 0.90 10*3/mm3    Eosinophils, Absolute 0.26 0.00 - 0.40 10*3/mm3    Basophils, Absolute 0.04 0.00 - 0.20 10*3/mm3    Immature Grans, Absolute 0.04 0.00 - 0.05 10*3/mm3    nRBC 0.0 0.0 - 0.2 /100 WBC   D-dimer, Quantitative    Collection Time: 04/13/24 10:38 PM    Specimen: Arm, Right; Blood   Result Value Ref Range    D-Dimer, Quantitative 1.25 (H) 0.00 - 0.77 MCGFEU/mL   Respiratory Panel PCR w/COVID-19(SARS-CoV-2) BIGG/MIRNA/DENISE/PAD/COR/MICHELE In-House, NP Swab in UTM/VTM, 2 HR TAT - Swab, Nasopharynx    Collection Time: 04/13/24 10:42 PM    Specimen: Nasopharynx; Swab   Result Value Ref Range    ADENOVIRUS, PCR Not Detected Not Detected    Coronavirus 229E Not Detected Not Detected    Coronavirus HKU1 Not Detected Not Detected    Coronavirus NL63 Not Detected Not Detected    Coronavirus OC43 Not Detected Not Detected    COVID19 Not Detected Not Detected - Ref. Range    Human Metapneumovirus Not Detected Not Detected    Human Rhinovirus/Enterovirus Not Detected Not Detected    Influenza A PCR Not Detected Not Detected    Influenza B PCR Not Detected Not Detected    Parainfluenza Virus 1 Not Detected Not Detected    Parainfluenza Virus 2 Not Detected Not Detected    Parainfluenza Virus 3 Not Detected Not Detected    Parainfluenza Virus 4 Not Detected Not Detected    RSV, PCR Not Detected Not Detected    Bordetella pertussis pcr Not Detected Not  Detected    Bordetella parapertussis PCR Not Detected Not Detected    Chlamydophila pneumoniae PCR Not Detected Not Detected    Mycoplasma pneumo by PCR Not Detected Not Detected   POC Lactate    Collection Time: 04/13/24 10:42 PM    Specimen: Arterial Blood   Result Value Ref Range    Lactate 4.8 (C) 0.5 - 2.0 mmol/L    Notified Time      Notified Who cheyenne garcia md     Read Back Yes    POC Chem Panel    Collection Time: 04/13/24 10:42 PM    Specimen: Arterial Blood   Result Value Ref Range    Glucose 332 (H) 70 - 130 mg/dL    Sodium 140 136 - 145 mmol/L    POC Potassium 4.3 3.5 - 5.2 mmol/L    Ionized Calcium 1.21 (L) 2.20 - 2.55 mmol/L    Chloride 111 (H) 98 - 107 mmol/L    Creatinine 1.00 0.60 - 130.00 mg/dL    BUN 23 mg/dL    CO2 Content 16.1 (L) 23 - 27 mmol/L    Notified Who cheyenne garcia md     Read Back Yes    POC H&H    Collection Time: 04/13/24 10:42 PM    Specimen: Arterial Blood   Result Value Ref Range    Hemoglobin 14.7 12.0 - 17.0 g/dL    Hematocrit 43 38 - 51 %   Blood Gas, Arterial -    Collection Time: 04/13/24 10:42 PM    Specimen: Arterial Blood   Result Value Ref Range    Site Right Radial     Aly's Test Positive     pH, Arterial 7.264 (C) 7.350 - 7.450 pH units    pCO2, Arterial 35.5 35.0 - 45.0 mm Hg    pO2, Arterial 92.9 80.0 - 100.0 mm Hg    HCO3, Arterial 16.1 (L) 22.0 - 28.0 mmol/L    Base Excess, Arterial -10.0 (L) 0.0 - 2.0 mmol/L    O2 Saturation, Arterial 96.0 92.0 - 98.5 %    Barometric Pressure for Blood Gas 750.1000 mmHg    Modality NRB     Flow Rate 15.0000 lpm    Rate 20 Breaths/minute    Notified Taj garcia md     Read Back Yes     Notified Time      Hemodilution No    Comprehensive Metabolic Panel    Collection Time: 04/13/24 11:45 PM    Specimen: Arm, Left; Blood   Result Value Ref Range    Glucose 322 (H) 65 - 99 mg/dL    BUN 24 (H) 8 - 23 mg/dL    Creatinine 1.13 0.76 - 1.27 mg/dL    Sodium 140 136 - 145 mmol/L    Potassium 4.9 3.5 - 5.2 mmol/L    Chloride 107 98  - 107 mmol/L    CO2 16.0 (L) 22.0 - 29.0 mmol/L    Calcium 8.7 8.6 - 10.5 mg/dL    Total Protein 6.9 6.0 - 8.5 g/dL    Albumin 4.0 3.5 - 5.2 g/dL    ALT (SGPT) 18 1 - 41 U/L    AST (SGOT) 16 1 - 40 U/L    Alkaline Phosphatase 98 39 - 117 U/L    Total Bilirubin 0.2 0.0 - 1.2 mg/dL    Globulin 2.9 gm/dL    A/G Ratio 1.4 g/dL    BUN/Creatinine Ratio 21.2 7.0 - 25.0    Anion Gap 17.0 (H) 5.0 - 15.0 mmol/L    eGFR 66.9 >60.0 mL/min/1.73   Single High Sensitivity Troponin T    Collection Time: 04/13/24 11:45 PM    Specimen: Arm, Left; Blood   Result Value Ref Range    HS Troponin T 888 (C) <22 ng/L   Procalcitonin    Collection Time: 04/13/24 11:45 PM    Specimen: Arm, Left; Blood   Result Value Ref Range    Procalcitonin 0.06 0.00 - 0.25 ng/mL       Ordered the above labs and independently reviewed the results.        RADIOLOGY  XR Chest 1 View    Result Date: 4/13/2024  SINGLE VIEW OF THE CHEST  HISTORY: Shortness of air  COMPARISON: None available.  FINDINGS: There is cardiomegaly. There are bilateral alveolar and interstitial infiltrates. This is favored to represent edema, although correlation with any evidence of pneumonia is recommended. No pneumothorax is seen. There are bilateral effusions.      Bilateral alveolar and interstitial infiltrates, favored to represent edema.  This report was finalized on 4/13/2024 10:43 PM by Dr. Rochelle Harrell M.D on Workstation: [id number]       I ordered the above noted radiological studies. Reviewed by me and discussed with radiologist.  See dictation for official radiology interpretation.      PROCEDURES    Critical Care    Performed by: Greg Carcamo MD  Authorized by: Cayden Pepe MD    Critical care provider statement:     Critical care time (minutes): 50.    Critical care time was exclusive of:  Separately billable procedures and treating other patients    Critical care was necessary to treat or prevent imminent or life-threatening deterioration of the following  conditions:  Respiratory failure and cardiac failure    Critical care was time spent personally by me on the following activities:  Blood draw for specimens, development of treatment plan with patient or surrogate, discussions with consultants, examination of patient, evaluation of patient's response to treatment, obtaining history from patient or surrogate, review of old charts, re-evaluation of patient's condition, pulse oximetry, ordering and review of radiographic studies, ordering and review of laboratory studies and ordering and performing treatments and interventions    I assumed direction of critical care for this patient from another provider in my specialty: no      Care discussed with: admitting provider          MEDICATIONS GIVEN IN ER    Medications   sodium chloride 0.9 % flush 10 mL (has no administration in time range)   nitroglycerin (NITROSTAT) SL tablet 0.4 mg (has no administration in time range)   sodium chloride 0.9 % flush 10 mL (has no administration in time range)   sodium chloride 0.9 % flush 10 mL (has no administration in time range)   sodium chloride 0.9 % infusion 40 mL (has no administration in time range)   sennosides-docusate (PERICOLACE) 8.6-50 MG per tablet 2 tablet (2 tablets Oral Not Given 4/14/24 0110)     And   polyethylene glycol (MIRALAX) packet 17 g (has no administration in time range)     And   bisacodyl (DULCOLAX) EC tablet 5 mg (has no administration in time range)     And   bisacodyl (DULCOLAX) suppository 10 mg (has no administration in time range)   heparin (porcine) 5000 UNIT/ML injection 5,000 Units (5,000 Units Subcutaneous Not Given 4/14/24 0111)   Potassium Replacement - Follow Nurse / BPA Driven Protocol (has no administration in time range)   Magnesium Low Dose Replacement - Follow Nurse / BPA Driven Protocol (has no administration in time range)   Phosphorus Replacement - Follow Nurse / BPA Driven Protocol (has no administration in time range)   Calcium  Replacement - Follow Nurse / BPA Driven Protocol (has no administration in time range)   acetaminophen (TYLENOL) tablet 650 mg (has no administration in time range)     Or   acetaminophen (TYLENOL) suppository 650 mg (has no administration in time range)   ondansetron (ZOFRAN) injection 4 mg (has no administration in time range)   aspirin tablet 325 mg (has no administration in time range)   atorvastatin (LIPITOR) tablet 40 mg (has no administration in time range)   pantoprazole (PROTONIX) EC tablet 40 mg (has no administration in time range)   metoprolol succinate XL (TOPROL-XL) 24 hr tablet 50 mg (has no administration in time range)   tamsulosin (FLOMAX) 24 hr capsule 0.4 mg (has no administration in time range)   ipratropium-albuterol (DUO-NEB) nebulizer solution 3 mL (has no administration in time range)   methylPREDNISolone sodium succinate (SOLU-Medrol) injection 40 mg (has no administration in time range)   dextrose (GLUTOSE) oral gel 15 g (has no administration in time range)   dextrose (D50W) (25 g/50 mL) IV injection 25 g (has no administration in time range)   glucagon (GLUCAGEN) injection 1 mg (has no administration in time range)   insulin regular (humuLIN R,novoLIN R) injection 2-9 Units (has no administration in time range)   furosemide (LASIX) injection 40 mg (has no administration in time range)   furosemide (LASIX) injection 80 mg (80 mg Intravenous Given 4/13/24 9811)         All labs have been independently reviewed by me.  All radiology studies have been reviewed by me and I discussed with radiologist dictating the report when indicated below.  All EKG's independently viewed and interpreted by me.  Discussion below represents my analysis of pertinent findings related to patient's condition, differential diagnosis, treatment plan and final disposition.        PROGRESS, DATA ANALYSIS, CONSULTS, AND MEDICAL DECISION MAKING    DDx includes CHF, acute coronary syndrome, pulmonary embolism,  pneumothorax, pneumonia, asthma/COPD,aspiration,  pulmonary hypertension, metabolic acidosis, deconditioning, anemia, other hematologic etiologies such as CO poisoning, anxiety.         ED Course as of 04/14/24 0143   Sat Apr 13, 2024   2246 My own independent or potation of the EKG that was done at 10:35 PM reveals a rate of 123.  I think he is got a sinus tachycardia with some premature atrial contractions.  Possibly could be atrial fibrillation.  He has mild intraventricular conduction delay he has left atrial enlargement.  He has Q waves in the anterior septal leads.  I do not see any definitive acute injury pattern  When I compared to his previous EKG that was done on January 17, 2022 his Q waves in the anterior septal leads are new.  I would like to add that he does have prolongation of the RI interval as well. [MM]   2253 My own independent interpretation of the chest x-ray that was done immediately shows cardiomegaly and signs of congestive heart failure with interstitial prominence.  I did not see any obvious pneumothorax.  No definitive focal pulmonary consolidation. [MM]   2253 I did review the x-ray report from the radiologist.  She feels bilateral alveolar interstitial infiltrates favored to represent congestive heart failure and edema.  Please see her complete dictated report [MM]   2301 I have reassessed this patient.  His O2 sats 96% on the nonrebreather.  His heart rate is about 1 10-1 20s.  Both of those numbers have improved.  He looks like he is breathing better.  He is still in significant respiratory distress.  He really does not like the nonrebreather mask on him.  He wants it off.  I am uncertain that he is going to tolerate BiPAP.  We have ordered Lasix.  The nurse is giving that right at this time.  I will try putting him on high flow oxygen. [MM]   2302 Glucose(!): 332 [MM]   2302 Potassium: 4.3 [MM]   2302 Sodium: 140 [MM]   2302 Creatinine: 1.00 [MM]   2313 I think he is breathing better  and easier with the nonrebreather.  But the patient does not want a nonrebreather on.  He is on 15 L high flow.  His oxygen saturation is now 9192%.  I think his work of breathing has increased a little.  Again he does not want the nonrebreather mask or BiPAP.  He is alert and oriented x 3.  I informed him about my clinical concerns and the treatment that I would like to do at this time.  He wants to go ahead and continue to try with the oxygen in the nose. [MM]   2337 Patient states that he is doing better.  He seems to be breathing a little bit better.  I do still think he is in significant distress but is improved.  His O2 sat is 93% on the 15 L.  He does not want the mask.  His blood pressure is improved as well.  Heart rate is in the low 100s.  Informed him of the results of the test my concerns. [MM]   2338 I did speak with Kristen who is the midlevel provider and on for the ICU.  Dr. Pepe is the attending.  Informed her of the patient's presenting symptoms and results of the test and my initial treatment plan.  Agreed to admit to the ICU.  All questions answered [MM]   2338 pCO2, Arterial: 35.5 [MM]   2338 Lactate(!!): 4.8  Likely related to his degree of hypoxia [MM]   2338 proBNP(!): 10,180.0 [MM]   Sun Apr 14, 2024   0141 HS Troponin T(!!): 888  Multifactorial and why it is elevated.  He acute hypoxic respiratory failure and acute congestive heart failure.  Will can go ahead and continue to trend the troponin.  I do not see any obvious acute ST elevation MI on EKG.  No chest pain. [MM]      ED Course User Index  [MM] Greg Carcamo MD       AS OF 01:43 EDT VITALS:    BP - 115/81  HR - 113  TEMP - 96.1 °F (35.6 °C) (Tympanic)  02 SATS - 96%    SOCIAL DETERMINANTS OF HEALTH THAT IMPACT OR LIMIT CARE (For example..Homelessness,safe discharge, inability to obtain care, follow up, or prescriptions):      DIAGNOSIS  Final diagnoses:   Acute respiratory failure with hypoxia   Acute congestive heart failure,  unspecified heart failure type   Ischemic cardiomyopathy   Coagulopathy: Eliquis induced         DISPOSITION  Patient will be admitted to the intensive care unit.          DICTATED UTILIZING DRAGON DICTATION    Note Disclaimer: At [facility], we believe that sharing information builds trust and better relationships. You are receiving this note because you recently visited [facility]. It is possible you will see health information before a provider has talked with you about it. This kind of information can be easy to misunderstand. To help you fully understand what it means for your health, we urge you to discuss this note with your provider.       Greg Carcamo MD  04/14/24 0143

## 2024-04-14 NOTE — PROGRESS NOTES
Patient had persistent hypoxemia with actual worsening oxygenation and started to having drop in the blood pressure  BiPAP was attempted, the patient freaked out and could not tolerate it so we flipped back to high flow nasal cannula, he could not even tolerate a nonrebreather on his face  Bedside assessment was strongly suggestive of cardiogenic shock.  The case was discussed with cardiology, we requested to have an emergency bedside echocardiogram.  Bedside ultrasound done by the intensivist showed that the patient has significant distention of the neck vein, cardiac images showed very weak ejection fraction.  Patient was started on the dopamine in addition to the IV pressors  Kline catheter was ordered however patient coded before it could be placed in  Patient was examined:  He was cold distally with very weak distal pulses  Positive JVD  Positive crackles  Mild abdominal tenderness with no rebound  Tachypneic  Neurologically he was moving all extremities and able to interact with examiner  Dopamine rate was increased up to 10 and the patient had a cardiac arrest before further titration could be performed  His ABG showed pH of 7.26, his repeat troponin was 779 which is down from 888  Potassium was 5.3 bicarbonate was 15.7 with wide anion gap.  proBNP was 10,000.  Will continue with the antibiotics but this is very consistent with cardiogenic shock and will be treated as such  Patient went into PEA and resuscitation was initiated with chest compression  We were unable to get ROSC at any point during the nearly 30 minutes of resuscitation  His rhythm went from PEA to ventricular fibrillation, pulseless V. tach, back into PEA and eventually went into asystole  Total critical care time excluding the code time was 52 minutes

## 2024-04-14 NOTE — PLAN OF CARE
Goal Outcome Evaluation:  Plan of Care Reviewed With: patient        Progress: no change  Outcome Evaluation: Patient admitted following EMS arrival to ED with complaint of severe shortness of breath. Patient was then placed on Bipap and Non-rebreather mask attempted, but patient was intolorant due to claustrophobia, 15L HFNC placed with saturations >90 as long as patient does not exert himself. BNP greater than 10,000.  Tropes 888. Lactic 4.9. D-dimer 1.25.  CTA of chest ordered, awaiting availability.  ST on telemetry with rates 110-120's. BP stable.  IV lasix ordered and administered in ED, due to void. Cardiology consulted, heparin on hold pending CTA results.  Blood glucose elevated at admit, insulin given per order. Plan continuing to develop.

## 2024-04-14 NOTE — PLAN OF CARE
Goal Outcome Evaluation:  Plan of Care Reviewed With: patient        Progress: declining  Outcome Evaluation: Patient recieved from ED shortly after midnight with acute hyoxic respiratory failure. Due to extreme claustrophobia, patient was unable to tolorate bipap or non-rebreather mask and was recieved to CCU on 15L HFNC.  Patient tolorated O2 treatment well until approximately 0500, at which time he became extremely tachypnic with increased hypoxia.  Heart rate and blood pressure quickly declined.  Patient was placed on norepinephrine drip and 15L non-rebreather was added to the HFNC.  Norepinepherine was titrated up without success, and the patient went into cardiac arrest with wide complex v-tach.  A code was called and compressions were started.  Epinepherine and dopamine drips were initiated and compressions were continued for more than 40 minutes without ROSC.  Defibrillation was attempted 3 times without success.  At 0614, the attending physician called the time of death and ressucitation efforts were stopped.

## 2024-04-14 NOTE — DISCHARGE SUMMARY
Date of Admission: 4/13/2024  Date of Discharge:  4/14/2024    Discharge Diagnosis:    Cardiogenic shock  Acute hypoxemic respiratory failure  Pulmonary edema  Congestive heart failure with decompensation  Elevated troponin  Peripheral vascular disease  COPD with no exacerbation  Diabetes mellitus    Presenting Problem/History of Present Illness    Niraj Hassan is a 78-year-old male with a past medical history of coronary artery disease, diabetes mellitus (oral medication controlled), GERD, hyperlipidemia, hypertension, and peripheral arterial disease.  He is on chronic anticoagulation with Eliquis, last dose evening of arrival to the ED.  He is a former smoker with 50 pack years, quit 2022.  Most recent echocardiogram from February 2022 shows an LVEF of 47.3%.     Patient presented to the emergency department on 4/13/2024 with complaints of acute onset of shortness of breath. He reports that he just got back in town from a trip to [place] about a week ago and began developing what he felt were allergy-like symptoms with congestion, rhinorrhea, and nonproductive cough.  Shortness of breath has developed over the past several days-worse with exertion and while laying flat.  Denied fever, chills, or sick contacts.  Denied swelling of lower extremities.  Patient had plan to go to urgent treatment center on Sunday morning when he began to feel exceptionally short of breath and called EMS.  Upon EMS arrival patient was described as having severe respiratory distress with tripoding, SpO2 was 70% on room air.  Patient received breathing treatment and was tried on BiPAP prior to ED arrival without improvement in symptoms.  Upon arrival to the ED, patient's SpO2 was 94% on a 15 L nonrebreather.  Patient was tachycardic with a heart rate of 118. Patient received 80 mg IV Lasix in the emergency department with able to be weaned to high flow nasal cannula. He is admitted to the intensive care unit for acute respiratory  failure with hypoxia.     Hospital Course    Patient was admitted to the intensive care unit for suspected decompensated congestive heart failure.  He was initiated on diuretic therapy.  Cardiology was consulted given extremely elevated high-sensitivity troponin of 888, EKG did not show evidence of acute ischemia requiring cardiac Cath Lab.  Throughout the night, patient began to become increasingly hypoxic, acidotic, and hypotensive.  He was intolerant of BiPAP therapy.  Given decompensation, a stat bedside echocardiogram was ordered.  Brief bedside echo by intensivist revealed significant extension of the internal jugular veins, and cardiac images showed weak ejection fraction.  Patient was initiated on dopamine, norepinephrine, epinephrine for inotropic support and vasopressor support.  He subsequently decompensated to cardiac arrest.  Throughout arrest, patient developed PEA, ventricular fibrillation, ventricular tachycardia.  Multiple antiarrhythmic medications and ACLS medications were provided without return of circulation.  After 30 minutes of high-quality CPR and ACLS protocol, patient remained asystole and was pronounced dead at 614.      Condition on Discharge:   : Date and Time of Death: 2024 at 6:14 AM       AGATHA Murguia  24  19:12 EDT    Time: I spent UNDER 30mins in the discharge planning of this patient.    Some of this encounter note is an electronic transcription/translation of spoken language to printed text.

## 2024-04-15 NOTE — PAYOR COMM NOTE
"Niraj Thompson P \"Jose\" (Dcsd. Male)     ATTN: NURSE REVIEWER  RE: INITIAL INPT AUTH CLINICALS   REF# [id number]  PLS REPLY TO JS MACHADO [phone] OR FAX# [phone]      Date of Birth   1945    Social Security Number       Address   [address] [place] [address]    Home Phone   [phone]    MRN   [id number]       Sikhism   None    Marital Status                               Admission Date   24    Admission Type   Emergency    Admitting Provider   Cayden Pepe MD    Attending Provider       Department, Room/Bed   [facility], N328/       Discharge Date   2024    Discharge Disposition    in Medical Facility    Discharge Destination                                 Attending Provider: (none)   Allergies: No Known Allergies    Isolation: None   Infection: None   Code Status: Prior    Ht: 170.2 cm (67\")   Wt: 69.9 kg (154 lb 1.6 oz)    Admission Cmt: None   Principal Problem: Acute respiratory failure with hypoxia [J96.01]                   Active Insurance as of 2024       Primary Coverage       Payor Plan Insurance Group Employer/Plan Group    HUMANA MEDICARE REPLACEMENT HUMANA MED ADV HMO [id number]       Payor Plan Address Payor Plan Phone Number Payor Plan Fax Number Effective Dates    [address] [address] [phone]  2023 - None Entered    Formerly [facility] [id number]         Subscriber Name Subscriber Birth Date Member ID       NIRAJ THOMPSON 1945 [id number]                     Emergency Contacts        (Rel.) Home Phone Work Phone Mobile Phone    Meena Landeros (Sister) [phone] -- [phone]    Brianda Vernon -- -- [phone]                 History & Physical        Nyasia Faustin APRN at 24 [id number]       Attestation signed by Cayden Pepe MD at 24 0622    Cayden CAPONE MD personally performed the services described in this encounter as documented by the APRN. The information is " accurate and complete.  I have independently examined the patient and agreed with the diagnostic assessment and treatment plan as formulated by the APRN.  Patient presented with acute onset shortness of breath, with low back pain, hypoxemia, with elevated proBNP and lactic acid and elevated troponin.  Repeat troponin did not go any higher, cardiology were notified, no intervention was recommended from their standpoint.  EKG showed no ST elevation  Patient was given IV Lasix and transferred to the CCU for further care  Chest x-ray showed pulmonary infiltrate with a pattern suggestive of pulmonary edema   Respiratory panel was negative, white count was borderline and ABG showed positive for metabolic acidosis  Exam:  JVD  Faint crackles posteriorly  Back pain but no tenderness to palpation over the lower back area where the pain is  Abdomen was tender to deep palpation but overall benign abdominal exam  Peripheral extremities were cold, very weak pulses but symmetrical.  Assessment and plan:  Acute hypoxemic respiratory failure  Pulmonary edema  Congestive heart failure with decompensation  Elevated troponin  Peripheral vascular disease  COPD with no exacerbation  Diabetes mellitus    Recommendation patient was given the diuretics, oxygen supplementation, admit to CCU, consult cardiology, get an echocardiogram.  Risk for pulmonary embolism is very small given the fact that the patient is on Eliquis.  Other possibility could include dissecting aneurysm  Patient will be covered with antibiotic                  Group: [facility]        HISTORY AND PHYSICAL    Patient Identification:  Niraj Hassan  78 y.o.  male  1945  [id number]            CC: shortness of breath, hypoxia    History of Present Illness:  Niraj Hassan is a 78-year-old male with a past medical history of coronary artery disease, diabetes mellitus (oral medication controlled), GERD, hyperlipidemia, hypertension, and peripheral  arterial disease.  He is on chronic anticoagulation with Eliquis, last dose evening of arrival to the ED.  He is a former smoker with 50 pack years, quit 2022.  Most recent echocardiogram from February 2022 shows an LVEF of 47.3%.    Patient presented to the emergency department on 4/13/2024 with complaints of acute onset of shortness of breath. He reports that he just got back in town from a trip to [place] about a week ago and began developing what he felt were allergy-like symptoms with congestion, rhinorrhea, and nonproductive cough.  Shortness of breath has developed over the past several days-worse with exertion and while laying flat.  Denied fever, chills, or sick contacts.  Denied swelling of lower extremities.  Patient had plan to go to urgent treatment center on Sunday morning when he began to feel exceptionally short of breath and called EMS.  Upon EMS arrival patient was described as having severe respiratory distress with tripoding, SpO2 was 70% on room air.  Patient received breathing treatment and was tried on BiPAP prior to ED arrival without improvement in symptoms.  Upon arrival to the ED, patient's SpO2 was 94% on a 15 L nonrebreather.  Patient was tachycardic with a heart rate of 118.    ED evaluation included: proBNP 10,180, WBC 12.19, lactate 4.8, high-sensitivity troponin 888, serum bicarbonate 16.0, anion gap 17.0.  Respiratory viral panel was negative.  Chest x-ray showed bilateral alveolar interstitial infiltrates, favored to represent edema. Patient received 80 mg IV Lasix in the emergency department with able to be weaned to high flow nasal cannula.  He is admitted to the intensive care unit for acute respiratory failure with hypoxia.    Review of Systems:  CONSTITUTIONAL:  Denies fevers or chills, +congestion, rhinorrhea  EYE:  No new vision changes  EAR:  No change in hearing  CARDIAC:  No chest pain, palpitations, or chest tightness  PULMONARY:  + shortness of breath, cough- no  wheezing  GI:  No diarrhea, hematemesis or hematochezia,  RENAL:  No dysuria or urinary frequency  MUSCULOSKELETAL:  No musculoskeletal complaints  ENDOCRINE:  No heat or cold intolerance  INTEGUMENTARY: No skin rashes  NEUROLOGICAL:  No dizziness or confusion.  No seizure activity  PSYCHIATRIC:  No new anxiety or depression  12 system review of systems performed and all else negative     Past Medical History:  Past Medical History:   Diagnosis Date    AAA (abdominal aortic aneurysm)     Allergic     Had for years. Mold is the main.    Aneurysm     Abreu esophagus     CAD (coronary artery disease)     DDD (degenerative disc disease), lumbar 05/14/2021    Diabetes mellitus     Enlarged prostate     GERD (gastroesophageal reflux disease)     Hyperlipidemia     Hypertension     Lumbar neuritis 05/14/2021    Myocardial infarction 1996, 2015    Neuropathy     RT FOOT    Osteoarthritis     PVD (peripheral vascular disease)     Sciatic nerve pain     Wound of right leg     STATED SIZE OF QUARTER, DUE TO BLOOD FLOW       Past Surgical History:  Past Surgical History:   Procedure Laterality Date    ANGIOPLASTY ILIAC ARTERY Right 1/19/2022    Procedure: RIGHT ILIAC STENET PLACEMENT;  Surgeon: Dharmesh Collazo MD;  Location: [facility] 18/19;  Service: Vascular;  Laterality: Right;    ARTERIAL BYPASS SURGERY      RIGHT LEG    CARDIAC CATHETERIZATION      showed total occlusion of the RCA, mild left main disease, and moderate disease of the circumflex.    COLONOSCOPY      CORONARY ANGIOPLASTY WITH STENT PLACEMENT      angioplasty and stent placement to the circumflex    ENDOSCOPY      ENDOSCOPY N/A 10/13/2021    Procedure: ESOPHAGOGASTRODUODENOSCOPY WITH BIOPSIES;  Surgeon: Gildardo Whelan MD;  Location: [facility];  Service: Gastroenterology;  Laterality: N/A;  IRREGULAR Z LINE    FEMORAL POPLITEAL BYPASS Right 3/12/2021    Procedure: RIGHT LEG BYPASS GRAFT REVISON;  Surgeon: Dharmesh Collazo MD;  Location:  "[facility] MAIN OR;  Service: Vascular;  Laterality: Right;    FEMORAL POPLITEAL BYPASS Right 2022    Procedure: RIGHT FEMORAL BELOW KNEE  POPLITEAL BYPASS;  Surgeon: Dharmesh Collazo MD;  Location: [facility] OR;  Service: Vascular;  Laterality: Right;        Home Meds:  (Not in a hospital admission)      Allergies:  No Known Allergies    Social History:   Social History     Socioeconomic History    Marital status:    Tobacco Use    Smoking status: Former     Current packs/day: 0.00     Average packs/day: 1 pack/day for 50.0 years (50.0 ttl pk-yrs)     Types: Cigarettes     Start date: 1/10/1972     Quit date: 1/10/2022     Years since quittin.2    Smokeless tobacco: Never    Tobacco comments:     caffeine use   Vaping Use    Vaping status: Never Used   Substance and Sexual Activity    Alcohol use: Yes     Comment: Occasional social    Drug use: No    Sexual activity: Not Currently     Partners: Female       Family History:  Family History   Problem Relation Age of Onset    Heart attack Mother     Cancer Mother         Passes in     Heart disease Mother         Five heart attacks. Pacemaker    Heart attack Father     Heart disease Father         Passed from heart attack in     Colon polyps Neg Hx     Colon cancer Neg Hx     Malig Hyperthermia Neg Hx        Physical Exam:  /74   Pulse 102   Temp 96.1 °F (35.6 °C) (Tympanic)   Resp 24   Ht 172.7 cm (68\")   Wt 75 kg (165 lb 5.5 oz)   SpO2 97%   BMI 25.14 kg/m²  Body mass index is 25.14 kg/m². 97% 75 kg (165 lb 5.5 oz)    Constitutional: Elderly male patient, appears older than stated age, pale, tachypneic but able to hold conversation  Head: - NCAT  Eyes: No pallor, Anicteric conjunctiva, EOMI.  ENMT:  Mallampati 3, no oral thrush. Dry MM.   NECK: Trachea midline, No thyromegaly, no palpable cervical lymphadenopathy  Heart: Tachycardic rate, regular rhythm, no murmur. No pedal edema   Lungs: MAURICE +, bibasilar crackles, diminished, " some wheezing  Abdomen: Soft. No tenderness, guarding or rigidity. No palpable masses  Extremities: Extremities pale and well perfused. No cyanosis/ clubbing  Neuro: Conscious, answers appropriately, no gross focal neuro deficits  Psych: Mood and affect appropriate    PPE recommended per [facility] infectious disease Isolation protocol for the current clinical scenario(as mentioned below) was followed.      LABS:  COVID19   Date Value Ref Range Status   04/13/2024 Not Detected Not Detected - Ref. Range Final       Lab Results   Component Value Date    CALCIUM 8.7 04/13/2024     Results from last 7 days   Lab Units 04/13/24  2345 04/13/24 2242 04/13/24 2238   SODIUM mmol/L 140  --   --    POTASSIUM mmol/L 4.9  --   --    CHLORIDE mmol/L 107  --   --    CO2 mmol/L 16.0*  --   --    BUN mg/dL 24*  --   --    CREATININE mg/dL 1.13 1.00  --    GLUCOSE mg/dL 322*  --   --    CALCIUM mg/dL 8.7  --   --    WBC 10*3/mm3  --   --  12.19*   HEMOGLOBIN g/dL  --   --  13.1   HEMOGLOBIN, POC g/dL  --  14.7  --    PLATELETS 10*3/mm3  --   --  352   ALT (SGPT) U/L 18  --   --    AST (SGOT) U/L 16  --   --    PROBNP pg/mL  --   --  10,180.0*     Lab Results   Component Value Date    TROPONINT 888 (C) 04/13/2024     Results from last 7 days   Lab Units 04/13/24 2345   HSTROP T ng/L 888*         Results from last 7 days   Lab Units 04/13/24 2242   LACTATE mmol/L 4.8*     Results from last 7 days   Lab Units 04/13/24 2242   PH, ARTERIAL pH units 7.264*   PCO2, ARTERIAL mm Hg 35.5   PO2 ART mm Hg 92.9   O2 SATURATION ART % 96.0   FLOW RATE lpm 15.0000   MODALITY  NRB     Results from last 7 days   Lab Units 04/13/24 2242   ADENOVIRUS DETECTION BY PCR  Not Detected   CORONAVIRUS 229E  Not Detected   CORONAVIRUS HKU1  Not Detected   CORONAVIRUS NL63  Not Detected   CORONAVIRUS OC43  Not Detected   HUMAN METAPNEUMOVIRUS  Not Detected   HUMAN RHINOVIRUS/ENTEROVIRUS  Not Detected   INFLUENZA B PCR  Not Detected   PARAINFLUENZA 1   Not Detected   PARAINFLUENZA VIRUS 2  Not Detected   PARAINFLUENZA VIRUS 3  Not Detected   PARAINFLUENZA VIRUS 4  Not Detected   BORDETELLA PERTUSSIS PCR  Not Detected   BORDETELLA PARAPERTUSSIS PCR  Not Detected   CHLAMYDOPHILA PNEUMONIAE PCR  Not Detected   MYCOPLAMA PNEUMO PCR  Not Detected   RSV, PCR  Not Detected             Lab Results   Component Value Date    TSH 1.890 11/02/2021     Estimated Creatinine Clearance: 58.1 mL/min (by C-G formula based on SCr of 1.13 mg/dL).         Imaging: I personally visualized the images of scans/x-rays performed within last 3 days.      Assessment:  Acute respiratory failure with hypoxia  High anion gap metabolic acidosis/lactic acidosis  Elevated troponin with history of coronary artery disease  COPD, suspected, acute exacerbation  Pulmonary edema  Diabetes mellitus  GERD  Hyperlipidemia  Hypertension  Peripheral arterial disease  Infrarenal abdominal aortic aneurysm, stable (per CT from January 2024)  Former tobacco abuse, 50 pack years  Chronic anticoagulation with Eliquis      Recommendations:  Acute respiratory failure with hypoxia  Admitted to the intensive care unit, still requiring high flow nasal cannula at 15 L/min to maintain SpO2 greater 90%.  Wean supplemental oxygen as tolerated to maintain SpO2 88 to 92%.    High anion gap metabolic acidosis/lactic acidosis  Lactic acid 4.8-suspect related to respiratory distress-repeat ordered.    Elevated troponin with history of coronary artery disease  Troponin 888-trend.  EKG reviewed showing sinus tachycardia, no evidence of acute infarct.  Cardiology consult for elevated troponin, known diagnosis of CAD with previous stenting.  Follows with Dr. Manriquez in the outpatient setting.  (Patient is currently on 325 mg aspirin daily and Eliquis)    COPD, suspected, acute exacerbation  Former tobacco abuse, 50 pack years  Significant smoking history- no formal diagnosis of COPD.  Denies shortness of breath at baseline, does not  utilize oxygen, not prescribed.  Scheduled bronchodilator therapy- Duonebs ordered four times daily.  IV Solu-medrol 40mg q 8 hours    Pulmonary edema/ suspected congestive heart failure exacerbation  ECHO from February 2022 showed LVEF of 47.3% with mildly decreased left ventricular systolic function, and grade 1 impaired diastolic function.  Chest x-ray showed evidence of bilateral alveolar interstitial infiltrates, suspect pulmonary edema.  Patient received 80 mg of IV Lasix with improvement in hypoxemia in the emergency department-continue 40 mg IV twice daily for now.  Monitor strict I's and O's.    Diabetes mellitus  Home medication: Glipizide, metformin-currently on hold in favor of subcutaneous insulin.  Suspect hyperglycemia will worsen with steroids.    GERD  Home medication: Lansoprazole, utilizing pantoprazole.    Hyperlipidemia  Home medication: 40 mg atorvastatin, continue.    Hypertension  Home medication: Amlodipine 5 mg, metoprolol succinate 50 mg, losartan-hydrochlorothiazide 40-25 mg daily.    Continue metoprolol.    Peripheral arterial disease  Infrarenal abdominal aortic aneurysm, stable (per CT from January 2024)  Followed by vascular surgery-history of angioplasty of the iliac artery, arterial bypass surgery, and femoropopliteal bypass.    Chronic anticoagulation with Eliquis  Appears Eliquis was initiated in February 2022 after vascular surgery took patient for right femoral to below-knee popliteal artery bypass.   Hold in case patient will require invasive cardiac intervention with elevated troponin-last dose was 4/13 in the evening.    Full code  NPO, okay for sips with meds  Heparin subcu for VTE prophylaxis    Patient was placed in face mask upon entering room and kept mask on throughout our encounter. I wore full protective equipment throughout this patient encounter including a face mask, gown and gloves. Hand hygiene was performed before donning protective equipment and after removal  when leaving the room.    Nyasia Faustin, AGATHA  2024  00:36 EDT      Much of this encounter note is an electronic transcription/translation of spoken language to printed text using Dragon Software.     Electronically signed by Cayden Pepe MD at 24 0622       Facility-Administered Medications as of 2024   Medication Dose Route Frequency Provider Last Rate Last Admin    [COMPLETED] amiodarone (CORDARONE) injection   Intravenous Code / Trauma / Sedation Medication Cayden Pepe MD   300 mg at 24 0556    [COMPLETED] Atropine Sulfate (PF) injection   Intravenous Code / Trauma / Sedation Medication Cayden Pepe MD   1 mg at 24 0612    [COMPLETED] calcium chloride injection   Intravenous Code / Trauma / Sedation Medication Cayden Pepe MD   1 g at 24 0552    [COMPLETED] EPINEPHrine (ADRENALIN) injection   Intravenous Code / Trauma / Sedation Medication Cayden Pepe MD   1 mg at 24 0609    [COMPLETED] EPINEPHrine 5 mg in 250 mL NS infusion    Code / Trauma / Sedation Continuous Med Cayden Pepe MD 62.9 mL/hr at 24 0559 0.3 mcg/kg/min at 24 0559    [COMPLETED] furosemide (LASIX) injection 80 mg  80 mg Intravenous Once Greg Carcamo MD   80 mg at 24 2258    [COMPLETED] lidocaine (cardiac) (XYLOCAINE) injection   Intravenous Code / Trauma / Sedation Medication Cayden Pepe MD   100 mg at 24 0606    [] magnesium sulfate in D5W 1g/100mL (PREMIX)  1 g Intravenous Q1H Cayden Pepe MD        [COMPLETED] magnesium sulfate injection    Code / Trauma / Sedation Medication Cayden Pepe MD   1 g at 24 0605    [COMPLETED] sodium bicarbonate injection 8.4%   Intravenous Code / Trauma / Sedation Medication Cayden Pepe MD   50 mEq at 24 0609     Lab Results (last 24 hours)       Procedure Component Value Units Date/Time    Urine Drug Screen - Urine, Clean Catch [[id number]]  (Normal) Collected: 24 0427    Specimen: Urine,  Clean Catch Updated: 04/14/24 0508     Amphet/Methamphet, Screen Negative     Barbiturates Screen, Urine Negative     Benzodiazepine Screen, Urine Negative     Cocaine Screen, Urine Negative     Opiate Screen Negative     THC, Screen, Urine Negative     Methadone Screen, Urine Negative     Oxycodone Screen, Urine Negative     Fentanyl, Urine Negative    Narrative:      Negative Thresholds Per Drugs Screened:    Amphetamines                 500 ng/ml  Barbiturates                 200 ng/ml  Benzodiazepines              100 ng/ml  Cocaine                      300 ng/ml  Methadone                    300 ng/ml  Opiates                      300 ng/ml  Oxycodone                    100 ng/ml  THC                           50 ng/ml  Fentanyl                       5 ng/ml      The Normal Value for all drugs tested is negative. This report includes final unconfirmed screening results to be used for medical treatment purposes only. Unconfirmed results must not be used for non-medical purposes such as employment or legal testing. Clinical consideration should be applied to any drug of abuse test, particularly when unconfirmed results are used.            High Sensitivity Troponin T 2Hr [[id number]]  (Abnormal) Collected: 04/14/24 0319    Specimen: Blood Updated: 04/14/24 0400     HS Troponin T 779 ng/L      Troponin T Delta -109 ng/L     Narrative:      High Sensitive Troponin T Reference Range:  <14.0 ng/L- Negative Female for AMI  <22.0 ng/L- Negative Male for AMI  >=14 - Abnormal Female indicating possible myocardial injury.  >=22 - Abnormal Male indicating possible myocardial injury.   Clinicians would have to utilize clinical acumen, EKG, Troponin, and serial changes to determine if it is an Acute Myocardial Infarction or myocardial injury due to an underlying chronic condition.         STAT Lactic Acid, Reflex [[id number]]  (Abnormal) Collected: 04/14/24 0319    Specimen: Blood Updated: 04/14/24 0400     Lactate 5.0 mmol/L      Basic Metabolic Panel [[id number]]  (Abnormal) Collected: 04/14/24 0319    Specimen: Blood Updated: 04/14/24 0349     Glucose 288 mg/dL      BUN 28 mg/dL      Creatinine 1.21 mg/dL      Sodium 139 mmol/L      Potassium 5.3 mmol/L      Chloride 105 mmol/L      CO2 15.7 mmol/L      Calcium 8.8 mg/dL      BUN/Creatinine Ratio 23.1     Anion Gap 18.3 mmol/L      eGFR 61.7 mL/min/1.73     Narrative:      GFR Normal >60  Chronic Kidney Disease <60  Kidney Failure <15    The GFR formula is only valid for adults with stable renal function between ages 18 and 70.    Magnesium [[id number]]  (Abnormal) Collected: 04/14/24 0319    Specimen: Blood Updated: 04/14/24 0349     Magnesium 1.5 mg/dL     CBC & Differential [[id number]]  (Abnormal) Collected: 04/14/24 0319    Specimen: Blood Updated: 04/14/24 0331    Narrative:      The following orders were created for panel order CBC & Differential.  Procedure                               Abnormality         Status                     ---------                               -----------         ------                     CBC Auto Differential[[id number]]        Abnormal            Final result                 Please view results for these tests on the individual orders.    CBC Auto Differential [[id number]]  (Abnormal) Collected: 04/14/24 0319    Specimen: Blood Updated: 04/14/24 0331     WBC 15.75 10*3/mm3      RBC 3.96 10*6/mm3      Hemoglobin 12.5 g/dL      Hematocrit 38.3 %      MCV 96.7 fL      MCH 31.6 pg      MCHC 32.6 g/dL      RDW 12.2 %      RDW-SD 43.2 fl      MPV 10.8 fL      Platelets 275 10*3/mm3      Neutrophil % 87.0 %      Lymphocyte % 8.7 %      Monocyte % 3.5 %      Eosinophil % 0.1 %      Basophil % 0.3 %      Immature Grans % 0.4 %      Neutrophils, Absolute 13.72 10*3/mm3      Lymphocytes, Absolute 1.37 10*3/mm3      Monocytes, Absolute 0.55 10*3/mm3      Eosinophils, Absolute 0.01 10*3/mm3      Basophils, Absolute 0.04 10*3/mm3      Immature Grans, Absolute 0.06  "10*3/mm3      nRBC 0.0 /100 WBC     STAT Lactic Acid, Reflex [[id number]]  (Abnormal) Collected: 04/14/24 0157    Specimen: Blood Updated: 04/14/24 0250     Lactate 4.9 mmol/L     POC Glucose Once [[id number]]  (Abnormal) Collected: 04/14/24 0155    Specimen: Blood Updated: 04/14/24 0202     Glucose 292 mg/dL     POC Glucose Once [[id number]]  (Normal) Collected: 04/14/24 0152    Specimen: Blood Updated: 04/14/24 0155     Glucose 102 mg/dL     Procalcitonin [[id number]]  (Normal) Collected: 04/13/24 2345    Specimen: Blood from Arm, Left Updated: 04/14/24 0130     Procalcitonin 0.06 ng/mL     Narrative:      As a Marker for Sepsis (Non-Neonates):    1. <0.5 ng/mL represents a low risk of severe sepsis and/or septic shock.  2. >2 ng/mL represents a high risk of severe sepsis and/or septic shock.    As a Marker for Lower Respiratory Tract Infections that require antibiotic therapy:    PCT on Admission    Antibiotic Therapy       6-12 Hrs later    >0.5                Strongly Recommended  >0.25 - <0.5        Recommended   0.1 - 0.25          Discouraged              Remeasure/reassess PCT  <0.1                Strongly Discouraged     Remeasure/reassess PCT    As 28 day mortality risk marker: \"Change in Procalcitonin Result\" (>80% or <=80%) if Day 0 (or Day 1) and Day 4 values are available. Refer to http://www.[facility]-pct-calculator.com    Change in PCT <=80%  A decrease of PCT levels below or equal to 80% defines a positive change in PCT test result representing a higher risk for 28-day all-cause mortality of patients diagnosed with severe sepsis for septic shock.    Change in PCT >80%  A decrease of PCT levels of more than 80% defines a negative change in PCT result representing a lower risk for 28-day all-cause mortality of patients diagnosed with severe sepsis or septic shock.       D-dimer, Quantitative [[id number]]  (Abnormal) Collected: 04/13/24 2238    Specimen: Blood from Arm, Right Updated: 04/14/24 0118     " "D-Dimer, Quantitative 1.25 MCGFEU/mL     Narrative:      According to the assay 's published package insert, a normal (<0.50 MCGFEU/mL) D-dimer result in conjunction with a non-high clinical probability assessment, excludes deep vein thrombosis (DVT) and pulmonary embolism (PE) with high sensitivity.    D-dimer values increase with age and this can make VTE exclusion of an older population difficult. To address this, the [employer], based on best available evidence and recent guidelines, recommends that clinicians use age-adjusted D-dimer thresholds in patients greater than 50 years of age with: a) a low probability of PE who do not meet all Pulmonary Embolism Rule Out Criteria, or b) in those with intermediate probability of PE.   The formula for an age-adjusted D-dimer cut-off is \"age/100\".  For example, a 60 year old patient would have an age-adjusted cut-off of 0.60 MCGFEU/mL and an 80 year old 0.80 MCGFEU/mL.    Single High Sensitivity Troponin T [[id number]]  (Abnormal) Collected: 04/13/24 2345    Specimen: Blood from Arm, Left Updated: 04/14/24 0033     HS Troponin T 888 ng/L     Narrative:      High Sensitive Troponin T Reference Range:  <14.0 ng/L- Negative Female for AMI  <22.0 ng/L- Negative Male for AMI  >=14 - Abnormal Female indicating possible myocardial injury.  >=22 - Abnormal Male indicating possible myocardial injury.   Clinicians would have to utilize clinical acumen, EKG, Troponin, and serial changes to determine if it is an Acute Myocardial Infarction or myocardial injury due to an underlying chronic condition.         Comprehensive Metabolic Panel [[id number]]  (Abnormal) Collected: 04/13/24 2345    Specimen: Blood from Arm, Left Updated: 04/14/24 0021     Glucose 322 mg/dL      BUN 24 mg/dL      Creatinine 1.13 mg/dL      Sodium 140 mmol/L      Potassium 4.9 mmol/L      Chloride 107 mmol/L      CO2 16.0 mmol/L      Calcium 8.7 mg/dL      Total Protein 6.9 g/dL  "     Albumin 4.0 g/dL      ALT (SGPT) 18 U/L      AST (SGOT) 16 U/L      Alkaline Phosphatase 98 U/L      Total Bilirubin 0.2 mg/dL      Globulin 2.9 gm/dL      A/G Ratio 1.4 g/dL      BUN/Creatinine Ratio 21.2     Anion Gap 17.0 mmol/L      eGFR 66.9 mL/min/1.73     Narrative:      GFR Normal >60  Chronic Kidney Disease <60  Kidney Failure <15    The GFR formula is only valid for adults with stable renal function between ages 18 and 70.    Respiratory Panel PCR w/COVID-19(SARS-CoV-2) [facility]G/MIRNA/DENISE/PAD/COR/MICHELE In-House, NP Swab in UTM/VTM, 2 HR TAT - Swab, Nasopharynx [[id number]]  (Normal) Collected: 04/13/24 2242    Specimen: Swab from Nasopharynx Updated: 04/13/24 8098     ADENOVIRUS, PCR Not Detected     Coronavirus 229E Not Detected     Coronavirus HKU1 Not Detected     Coronavirus NL63 Not Detected     Coronavirus OC43 Not Detected     COVID19 Not Detected     Human Metapneumovirus Not Detected     Human Rhinovirus/Enterovirus Not Detected     Influenza A PCR Not Detected     Influenza B PCR Not Detected     Parainfluenza Virus 1 Not Detected     Parainfluenza Virus 2 Not Detected     Parainfluenza Virus 3 Not Detected     Parainfluenza Virus 4 Not Detected     RSV, PCR Not Detected     Bordetella pertussis pcr Not Detected     Bordetella parapertussis PCR Not Detected     Chlamydophila pneumoniae PCR Not Detected     Mycoplasma pneumo by PCR Not Detected    Narrative:      In the setting of a positive respiratory panel with a viral infection PLUS a negative procalcitonin without other underlying concern for bacterial infection, consider observing off antibiotics or discontinuation of antibiotics and continue supportive care. If the respiratory panel is positive for atypical bacterial infection (Bordetella pertussis, Chlamydophila pneumoniae, or Mycoplasma pneumoniae), consider antibiotic de-escalation to target atypical bacterial infection.    [facility] Draw [[id number]] Collected: 04/13/24 2238    Specimen: Blood  from Arm, Right Updated: 04/13/24 2345    Narrative:      The following orders were created for panel order [place] Draw.  Procedure                               Abnormality         Status                     ---------                               -----------         ------                     Green Top (Gel)[[id number]]                                  Final result               Lavender Top[[id number]]                                     Final result               Gold Top - SST[[id number]]                                   Final result               Light Blue Top[[id number]]                                   Final result                 Please view results for these tests on the individual orders.    Green Top (Gel) [[id number]] Collected: 04/13/24 2238    Specimen: Blood from Arm, Right Updated: 04/13/24 2345     Extra Tube Hold for add-ons.     Comment: Auto resulted.       Lavender Top [[id number]] Collected: 04/13/24 2238    Specimen: Blood from Arm, Right Updated: 04/13/24 2345     Extra Tube hold for add-on     Comment: Auto resulted       Gold Top - SST [[id number]] Collected: 04/13/24 2238    Specimen: Blood from Arm, Right Updated: 04/13/24 2345     Extra Tube Hold for add-ons.     Comment: Auto resulted.       Light Blue Top [[id number]] Collected: 04/13/24 2238    Specimen: Blood from Arm, Right Updated: 04/13/24 2345     Extra Tube Hold for add-ons.     Comment: Auto resulted       BNP [[id number]]  (Abnormal) Collected: 04/13/24 2238    Specimen: Blood from Arm, Right Updated: 04/13/24 2305     proBNP 10,180.0 pg/mL     Narrative:      This assay is used as an aid in the diagnosis of individuals suspected of having heart failure. It can be used as an aid in the diagnosis of acute decompensated heart failure (ADHF) in patients presenting with signs and symptoms of ADHF to the emergency department (ED). In addition, NT-proBNP of <300 pg/mL indicates ADHF is not likely.    Age Range Result Interpretation   NT-proBNP Concentration (pg/mL:      <50             Positive            >450                   Gray                 300-450                    Negative             <300    50-75           Positive            >900                  Gray                300-900                  Negative            <300      >75             Positive            >1800                  Gray                300-1800                  Negative            <300    POC Lactate [[id number]]  (Abnormal) Collected: 04/13/24 2242    Specimen: Arterial Blood Updated: 04/13/24 2249     Lactate 4.8 mmol/L      Comment: Serial Number: [id number]:  [id number]        Notified Time --     Notified Who cheyenne garcia md     Read Back Yes    POC Chem Panel [[id number]]  (Abnormal) Collected: 04/13/24 2242    Specimen: Arterial Blood Updated: 04/13/24 2249     Glucose 332 mg/dL      Comment: Serial Number: [id number]:  [id number]        Sodium 140 mmol/L      POC Potassium 4.3 mmol/L      Ionized Calcium 1.21 mmol/L      Chloride 111 mmol/L      Creatinine 1.00 mg/dL      BUN 23 mg/dL      CO2 Content 16.1 mmol/L      Notified Who cheyenne garcia md     Read Back Yes    POC H&H [[id number]]  (Normal) Collected: 04/13/24 2242    Specimen: Arterial Blood Updated: 04/13/24 2249     Hemoglobin 14.7 g/dL      Comment: Serial Number: [id number]:  [id number]        Hematocrit 43 %     Blood Gas, Arterial - [[id number]]  (Abnormal) Collected: 04/13/24 2242    Specimen: Arterial Blood Updated: 04/13/24 2249     Site Right Radial     Aly's Test Positive     pH, Arterial 7.264 pH units      pCO2, Arterial 35.5 mm Hg      pO2, Arterial 92.9 mm Hg      HCO3, Arterial 16.1 mmol/L      Base Excess, Arterial -10.0 mmol/L      Comment: Serial Number: [id number]:  [id number]        O2 Saturation, Arterial 96.0 %      Barometric Pressure for Blood Gas 750.1000 mmHg      Modality NRB     Flow Rate 15.0000 lpm      Rate 20 Breaths/minute      Notified Who cheyenne garcia md     Read Back Yes      Notified Time --     Hemodilution No    CBC & Differential [[id number]]  (Abnormal) Collected: 04/13/24 2238    Specimen: Blood from Arm, Right Updated: 04/13/24 2248    Narrative:      The following orders were created for panel order CBC & Differential.  Procedure                               Abnormality         Status                     ---------                               -----------         ------                     CBC Auto Differential[[id number]]        Abnormal            Final result                 Please view results for these tests on the individual orders.    CBC Auto Differential [[id number]]  (Abnormal) Collected: 04/13/24 2238    Specimen: Blood from Arm, Right Updated: 04/13/24 2248     WBC 12.19 10*3/mm3      RBC 4.31 10*6/mm3      Hemoglobin 13.1 g/dL      Hematocrit 41.4 %      MCV 96.1 fL      MCH 30.4 pg      MCHC 31.6 g/dL      RDW 12.2 %      RDW-SD 43.7 fl      MPV 11.2 fL      Platelets 352 10*3/mm3      Neutrophil % 53.2 %      Lymphocyte % 36.6 %      Monocyte % 7.5 %      Eosinophil % 2.1 %      Basophil % 0.3 %      Immature Grans % 0.3 %      Neutrophils, Absolute 6.47 10*3/mm3      Lymphocytes, Absolute 4.46 10*3/mm3      Monocytes, Absolute 0.92 10*3/mm3      Eosinophils, Absolute 0.26 10*3/mm3      Basophils, Absolute 0.04 10*3/mm3      Immature Grans, Absolute 0.04 10*3/mm3      nRBC 0.0 /100 WBC           Imaging Results (Last 24 Hours)       Procedure Component Value Units Date/Time    XR Chest 1 View [[id number]] Collected: 04/13/24 2242     Updated: 04/13/24 2246    Narrative:      SINGLE VIEW OF THE CHEST     HISTORY: Shortness of air     COMPARISON: None available.     FINDINGS:  There is cardiomegaly. There are bilateral alveolar and interstitial  infiltrates. This is favored to represent edema, although correlation  with any evidence of pneumonia is recommended. No pneumothorax is seen.  There are bilateral effusions.       Impression:      Bilateral alveolar and  interstitial infiltrates, favored to represent  edema.     This report was finalized on 4/13/2024 10:43 PM by Dr. Rochelle Harrell M.D on Workstation: [id number]             ECG/EMG Results (last 24 hours)       ** No results found for the last 24 hours. **          Orders (last 24 hrs)        Start     Ordered    04/15/24 0001  NPO Diet NPO Type: Strict NPO  Diet Effective Midnight,   Status:  Canceled         04/14/24 0041 04/14/24 0900  furosemide (LASIX) injection 40 mg  3 Times Daily,   Status:  Discontinued         04/14/24 0041 04/14/24 0900  aspirin tablet 325 mg  Daily,   Status:  Discontinued         04/14/24 0042 04/14/24 0900  atorvastatin (LIPITOR) tablet 40 mg  Daily,   Status:  Discontinued         04/14/24 0042 04/14/24 0900  metoprolol succinate XL (TOPROL-XL) 24 hr tablet 50 mg  Daily,   Status:  Discontinued         04/14/24 0042 04/14/24 0900  tamsulosin (FLOMAX) 24 hr capsule 0.4 mg  Daily,   Status:  Discontinued         04/14/24 0042 04/14/24 0900  furosemide (LASIX) injection 40 mg  2 Times Daily (Diuretics),   Status:  Discontinued         04/14/24 0047 04/14/24 0830  ipratropium-albuterol (DUO-NEB) nebulizer solution 3 mL  4 Times Daily - RT,   Status:  Discontinued         04/14/24 0046 04/14/24 0700  POC Glucose 4x Daily Before Meals & at Bedtime  4 Times Daily Before Meals & at Bedtime,   Status:  Canceled      Comments: Complete no more than 45 minutes prior to patient eating      04/14/24 0046 04/14/24 0630  fentaNYL citrate (PF) (SUBLIMAZE) injection 25 mcg  Once,   Status:  Discontinued         04/14/24 0544    04/14/24 0619  STAT Lactic Acid, Reflex  PROCEDURE ONCE,   Status:  Canceled         04/14/24 0400    04/14/24 0615  DOPamine 400 mg in 250 mL D5W infusion  Titrated,   Status:  Discontinued         04/14/24 0518    04/14/24 0612  Atropine Sulfate (PF) injection  Code / Trauma / Sedation Medication         04/14/24 0613    04/14/24 0606   lidocaine (cardiac) (XYLOCAINE) injection  Code / Trauma / Sedation Medication         04/14/24 0608    04/14/24 0605  magnesium sulfate injection  Code / Trauma / Sedation Medication         04/14/24 0605    04/14/24 0600  Basic Metabolic Panel  Daily,   Status:  Canceled       04/14/24 0041    04/14/24 0600  CBC & Differential  Daily,   Status:  Canceled       04/14/24 0041    04/14/24 0600  Magnesium  Daily,   Status:  Canceled       04/14/24 0041    04/14/24 0600  ECG 12 Lead Dyspnea  Once         04/14/24 0041    04/14/24 0600  CBC Auto Differential  PROCEDURE ONCE         04/14/24 0041    04/14/24 0600  pantoprazole (PROTONIX) EC tablet 40 mg  Every Early Morning,   Status:  Discontinued         04/14/24 0042    04/14/24 0600  norepinephrine (LEVOPHED) 8 mg in 250 mL NS infusion (premix)  Titrated,   Status:  Discontinued         04/14/24 0502    04/14/24 0559  EPINEPHrine 5 mg in 250 mL NS infusion  Code / Trauma / Sedation Continuous Med         04/14/24 0616    04/14/24 0556  amiodarone (CORDARONE) injection  Code / Trauma / Sedation Medication         04/14/24 0558    04/14/24 0552  calcium chloride injection  Code / Trauma / Sedation Medication         04/14/24 0556    04/14/24 0548  EPINEPHrine (ADRENALIN) injection  Code / Trauma / Sedation Medication         04/14/24 0551    04/14/24 0547  sodium bicarbonate injection 8.4%  Code / Trauma / Sedation Medication         04/14/24 0550    04/14/24 0516  Adult Transthoracic Echo Complete W/ Cont if Necessary Per Protocol  Once,   Status:  Canceled         04/14/24 0515    04/14/24 0515  cefTRIAXone (ROCEPHIN) 1,000 mg in sodium chloride 0.9 % 100 mL MBP  Every 24 Hours,   Status:  Discontinued         04/14/24 0426    04/14/24 0501  ECG 12 Lead Dyspnea  STAT         04/14/24 0501    04/14/24 0500  magnesium sulfate in D5W 1g/100mL (PREMIX)  Every 1 Hour         04/14/24 0408    04/14/24 0457  STAT Lactic Acid, Reflex  PROCEDURE ONCE         04/14/24 0250     "04/14/24 0427  Blood Culture - Blood,  Once,   Status:  Canceled        Placed in \"And\" Linked Group    04/14/24 0426    04/14/24 0427  Blood Culture - Blood,  Once,   Status:  Canceled        Placed in \"And\" Linked Group    04/14/24 0426    04/14/24 0413  CT Angiogram Chest  1 Time Imaging,   Status:  Canceled         04/14/24 0412    04/14/24 0413  CT Abdomen Pelvis Without Contrast  1 Time Imaging,   Status:  Canceled         04/14/24 0412    04/14/24 0250  High Sensitivity Troponin T 2Hr  PROCEDURE ONCE         04/14/24 0033    04/14/24 0212  CT Angiogram Chest  1 Time Imaging,   Status:  Canceled         04/14/24 0211    04/14/24 0203  POC Glucose Once  PROCEDURE ONCE        Comments: Complete no more than 45 minutes prior to patient eating      04/14/24 0155    04/14/24 0156  POC Glucose Once  PROCEDURE ONCE        Comments: Complete no more than 45 minutes prior to patient eating      04/14/24 0152    04/14/24 0143  Critical Care  Once        Comments: This order was created via procedure documentation    04/14/24 0142    04/14/24 0142  STAT Lactic Acid, Reflex  PROCEDURE ONCE         04/13/24 2249    04/14/24 0141  Inpatient Cardiology Consult  Once,   Status:  Canceled        Specialty:  Cardiology  Provider:  Juan Luis Mota MD    04/14/24 0145    04/14/24 0122  NPO Diet NPO Type: Sips with Meds  Diet Effective Now,   Status:  Canceled         04/14/24 0122    04/14/24 0102  methylPREDNISolone sodium succinate (SOLU-Medrol) injection 40 mg  Every 8 Hours,   Status:  Discontinued         04/14/24 0046    04/14/24 0102  insulin regular (humuLIN R,novoLIN R) injection 2-9 Units  Every 6 Hours Scheduled,   Status:  Discontinued         04/14/24 0046    04/14/24 0100  Vital Signs Every Hour and Per Hospital Policy Based on Patient Condition  Every Hour,   Status:  Canceled       04/14/24 0041 04/14/24 0100  Intake & Output  Every Hour,   Status:  Canceled       04/14/24 0041 04/14/24 0057  sodium " "chloride 0.9 % flush 10 mL  Every 12 Hours Scheduled,   Status:  Discontinued         04/14/24 0041 04/14/24 0057  sennosides-docusate (PERICOLACE) 8.6-50 MG per tablet 2 tablet  2 Times Daily,   Status:  Discontinued        Placed in \"And\" Linked Group    04/14/24 0041 04/14/24 0057  heparin (porcine) 5000 UNIT/ML injection 5,000 Units  Every 12 Hours Scheduled,   Status:  Discontinued         04/14/24 0041 04/14/24 0056  Procalcitonin  Once         04/14/24 0055 04/14/24 0046  Follow Hypoglycemia Standing Orders For Blood Glucose <70 & Notify Provider of Treatment  As Needed,   Status:  Canceled      Comments: Follow Hypoglycemia Orders As Outlined in Process Instructions (Open Order Report to View Full Instructions)  Notify Provider Any Time Hypoglycemia Treatment is Administered    04/14/24 0046 04/14/24 0046  dextrose (GLUTOSE) oral gel 15 g  Every 15 Minutes PRN,   Status:  Discontinued         04/14/24 0046 04/14/24 0046  dextrose (D50W) (25 g/50 mL) IV injection 25 g  Every 15 Minutes PRN,   Status:  Discontinued         04/14/24 0046 04/14/24 0046  glucagon (GLUCAGEN) injection 1 mg  Every 15 Minutes PRN,   Status:  Discontinued         04/14/24 0046 04/14/24 0042  Daily Weights  Daily,   Status:  Canceled       04/14/24 0041 04/14/24 0041  ondansetron (ZOFRAN) injection 4 mg  Every 6 Hours PRN,   Status:  Discontinued         04/14/24 0041 04/14/24 0040  acetaminophen (TYLENOL) tablet 650 mg  Every 4 Hours PRN,   Status:  Discontinued        Placed in \"Or\" Linked Group    04/14/24 0041 04/14/24 0040  acetaminophen (TYLENOL) suppository 650 mg  Every 4 Hours PRN,   Status:  Discontinued        Placed in \"Or\" Linked Group    04/14/24 0041 04/14/24 0040  Potassium Replacement - Follow Nurse / BPA Driven Protocol  As Needed,   Status:  Discontinued         04/14/24 0041 04/14/24 0040  Magnesium Low Dose Replacement - Follow Nurse / BPA Driven Protocol  As Needed,   " Status:  Discontinued         04/14/24 0041    04/14/24 0040  Phosphorus Replacement - Follow Nurse / BPA Driven Protocol  As Needed,   Status:  Discontinued         04/14/24 0041    04/14/24 0040  Calcium Replacement - Follow Nurse / BPA Driven Protocol  As Needed,   Status:  Discontinued         04/14/24 0041 04/14/24 0040  Urine Drug Screen - Urine, Clean Catch  Once         04/14/24 0041    04/14/24 0039  Inpatient Cardiology Consult  Once,   Status:  Canceled        Specialty:  Cardiology  Provider:  Juan Luis Mota MD    04/14/24 0041    04/14/24 0039  D-dimer, Quantitative  Once         04/14/24 0041 04/14/24 0038  Code Status and Medical Interventions:  Continuous,   Status:  Canceled         04/14/24 0041 04/14/24 0038  VTE Prophylaxis Not Indicated: Other: Patient Currently Anticoagulated / Receiving Prophylaxis  Once         04/14/24 0041 04/14/24 0038  If Patient has BG Less Than 80 & is Symptomatic But Not on IV Insulin Protocol - Use Adult Hypoglycemia Treatment Orders  Continuous,   Status:  Canceled         04/14/24 0041 04/14/24 0038  Maintain IV Access  Continuous         04/14/24 0041 04/14/24 0038  ICU / CCU - Place Order Consult Intensivist For Critical Care Management (If Patient Not Admitted to Cardiology for Primary Cardiology Condition)  Continuous,   Status:  Canceled         04/14/24 0041 04/14/24 0038  ICU / CCU - Notify All Physicians When Patient is Transferred  Continuous        Comments: Open Order Report to View Parameters Requiring Provider Notification    04/14/24 0041 04/14/24 0037  Oxygen Therapy- Nasal Cannula; Titrate 1-6 LPM Per SpO2; 90 - 95%  Continuous PRN,   Status:  Canceled       04/14/24 0041 04/14/24 0037  Continuous Cardiac Monitoring  Continuous,   Status:  Canceled        Comments: Follow Standing Orders As Outlined in Process Instructions (Open Order Report to View Full Instructions)    04/14/24 0041 04/14/24 0037  Maintain IV  "Access  Continuous,   Status:  Canceled         04/14/24 0041 04/14/24 0037  Telemetry - Place Orders & Notify Provider of Results When Patient Experiences Acute Chest Pain, Dysrhythmia or Respiratory Distress  Continuous        Comments: Open Order Report to View Parameters Requiring Provider Notification    04/14/24 0041 04/14/24 0037  Continuous Pulse Oximetry  Continuous         04/14/24 0041 04/14/24 0037  Height & Weight  Once         04/14/24 0041 04/14/24 0037  Oral Care - Patient Not on NPPV & Not Intubated  Every Shift,   Status:  Canceled       04/14/24 0041    04/14/24 0037  Target Arousal Level RASS 0 to -1  Continuous         04/14/24 0041 04/14/24 0037  Use Mobility Guidelines for Advancement of Activity  Continuous,   Status:  Canceled         04/14/24 0041 04/14/24 0037  Insert Peripheral IV  Once,   Status:  Canceled         04/14/24 0041 04/14/24 0037  Saline Lock & Maintain IV Access  Continuous,   Status:  Canceled         04/14/24 0041 04/14/24 0036  sodium chloride 0.9 % flush 10 mL  As Needed,   Status:  Discontinued         04/14/24 0041 04/14/24 0036  sodium chloride 0.9 % infusion 40 mL  As Needed,   Status:  Discontinued         04/14/24 0041 04/14/24 0036  polyethylene glycol (MIRALAX) packet 17 g  Daily PRN,   Status:  Discontinued        Placed in \"And\" Linked Group    04/14/24 0041 04/14/24 0036  bisacodyl (DULCOLAX) EC tablet 5 mg  Daily PRN,   Status:  Discontinued        Placed in \"And\" Linked Group    04/14/24 0041 04/14/24 0036  bisacodyl (DULCOLAX) suppository 10 mg  Daily PRN,   Status:  Discontinued        Placed in \"And\" Linked Group    04/14/24 0041 04/14/24 0036  nitroglycerin (NITROSTAT) SL tablet 0.4 mg  Every 5 Minutes PRN,   Status:  Discontinued         04/14/24 0041 04/13/24 2357  [place] Draw  Once,   Status:  Canceled         04/13/24 2356    04/13/24 2340  Inpatient Admission  Once         04/13/24 2339    04/13/24 2319  " Comprehensive Metabolic Panel  Once         04/13/24 2232 04/13/24 2319  Single High Sensitivity Troponin T  Once         04/13/24 2232 04/13/24 2315  Pulmonology (on-call MD unless specified)  Once,   Status:  Canceled        Specialty:  Pulmonary Disease  Provider:  (Not yet assigned)    04/13/24 2314 04/13/24 2310  furosemide (LASIX) injection 80 mg  Once         04/13/24 2254 04/13/24 2250  POC Lactate  PROCEDURE ONCE         04/13/24 2242 04/13/24 2250  POC Chem Panel  PROCEDURE ONCE         04/13/24 2242 04/13/24 2250  POC H&H  PROCEDURE ONCE         04/13/24 2242 04/13/24 2250  Blood Gas, Arterial -  PROCEDURE ONCE         04/13/24 2242 04/13/24 2245  POC Electrolyte Panel  Once         04/13/24 2245 04/13/24 2243  Blood Gas, Arterial -  STAT         04/13/24 2244 04/13/24 2241  Respiratory Panel PCR w/COVID-19(SARS-CoV-2) [facility]/MIRNA/DENISE/PAD/COR/MICHELE In-House, NP Swab in UTM/VTM, 2 HR TAT - Swab, Nasopharynx  STAT         04/13/24 2241 04/13/24 2233  NPO Diet NPO Type: Strict NPO  Diet Effective Now,   Status:  Canceled         04/13/24 2232 04/13/24 2233  Undress & Gown  Once         04/13/24 2232 04/13/24 2233  Cardiac Monitoring  Continuous,   Status:  Canceled        Comments: Follow Standing Orders As Outlined in Process Instructions (Open Order Report to View Full Instructions)    04/13/24 2232 04/13/24 2233  Continuous Pulse Oximetry  Continuous,   Status:  Canceled         04/13/24 2232 04/13/24 2233  Vital Signs  Per Hospital Policy         04/13/24 2232 04/13/24 2233  ECG 12 Lead ED Triage Standing Order; SOA  Once         04/13/24 2232 04/13/24 2233  XR Chest 1 View  1 Time Imaging         04/13/24 2232 04/13/24 2233  Insert Peripheral IV  Once,   Status:  Canceled         04/13/24 2232 04/13/24 2233  [place] Draw  Once         04/13/24 2232 04/13/24 2233  CBC & Differential  Once         04/13/24 2232 04/13/24 2233  BNP  Once          04/13/24 2232 04/13/24 2233  Green Top (Gel)  PROCEDURE ONCE         04/13/24 2232 04/13/24 2233  Lavender Top  PROCEDURE ONCE         04/13/24 2232 04/13/24 2233  Gold Top - SST  PROCEDURE ONCE         04/13/24 2232 04/13/24 2233  Light Blue Top  PROCEDURE ONCE         04/13/24 2232 04/13/24 2233  CBC Auto Differential  PROCEDURE ONCE         04/13/24 2232 04/13/24 2232  Oxygen Therapy- Nasal Cannula; Titrate 1-6 LPM Per SpO2; 90 - 95%  Continuous PRN,   Status:  Canceled       04/13/24 2232 04/13/24 2232  sodium chloride 0.9 % flush 10 mL  As Needed,   Status:  Discontinued         04/13/24 2232                  Operative/Procedure Notes (last 24 hours)  Notes from 04/13/24 2039 through 04/14/24 2039   No notes of this type exist for this encounter.          Physician Progress Notes (last 24 hours)        Cayden Pepe MD at 04/14/24 0622          Patient had persistent hypoxemia with actual worsening oxygenation and started to having drop in the blood pressure  BiPAP was attempted, the patient freaked out and could not tolerate it so we flipped back to high flow nasal cannula, he could not even tolerate a nonrebreather on his face  Bedside assessment was strongly suggestive of cardiogenic shock.  The case was discussed with cardiology, we requested to have an emergency bedside echocardiogram.  Bedside ultrasound done by the intensivist showed that the patient has significant distention of the neck vein, cardiac images showed very weak ejection fraction.  Patient was started on the dopamine in addition to the IV pressors  Kline catheter was ordered however patient coded before it could be placed in  Patient was examined:  He was cold distally with very weak distal pulses  Positive JVD  Positive crackles  Mild abdominal tenderness with no rebound  Tachypneic  Neurologically he was moving all extremities and able to interact with examiner  Dopamine rate was increased up to 10 and the patient  had a cardiac arrest before further titration could be performed  His ABG showed pH of 7.26, his repeat troponin was 779 which is down from 888  Potassium was 5.3 bicarbonate was 15.7 with wide anion gap.  proBNP was 10,000.  Will continue with the antibiotics but this is very consistent with cardiogenic shock and will be treated as such  Patient went into PEA and resuscitation was initiated with chest compression  We were unable to get ROSC at any point during the nearly 30 minutes of resuscitation  His rhythm went from PEA to ventricular fibrillation, pulseless V. tach, back into PEA and eventually went into asystole  Total critical care time excluding the code time was 52 minutes    Electronically signed by Cayden Pepe MD at 04/14/24 0627       Nyasia Faustin APRN at 04/14/24 0213          D-dimer mildly elevated at 1.25.    Patient denies recent unilateral leg swelling, redness or discomfort/cramping.  No history of DVT/PE  Reports compliance with anticoagulation.  Low suspicion for PE causing hypoxia-suspect hypoxia is more closely related to fluid overload and COPD exacerbation.    Lactic acid and WBC continuing to worsen- STAT CT chest and abdomen/pelvis ordered. Ceftriaxone ordered for sepsis, blood cultures ordered x 2.     Electronically signed by Nyasia Faustin APRN at 04/14/24 0426       Consult Notes (last 24 hours)  Notes from 04/13/24 2039 through 04/14/24 2039   No notes of this type exist for this encounter.          Discharge Summary        Nyasia Faustin APRN at 04/14/24 1027          Date of Admission: 4/13/2024  Date of Discharge:  4/14/2024    Discharge Diagnosis:    Cardiogenic shock  Acute hypoxemic respiratory failure  Pulmonary edema  Congestive heart failure with decompensation  Elevated troponin  Peripheral vascular disease  COPD with no exacerbation  Diabetes mellitus    Presenting Problem/History of Present Illness    Niraj Hassan is a 78-year-old male with a past medical  history of coronary artery disease, diabetes mellitus (oral medication controlled), GERD, hyperlipidemia, hypertension, and peripheral arterial disease.  He is on chronic anticoagulation with Eliquis, last dose evening of arrival to the ED.  He is a former smoker with 50 pack years, quit 2022.  Most recent echocardiogram from February 2022 shows an LVEF of 47.3%.     Patient presented to the emergency department on 4/13/2024 with complaints of acute onset of shortness of breath. He reports that he just got back in town from a trip to [place] about a week ago and began developing what he felt were allergy-like symptoms with congestion, rhinorrhea, and nonproductive cough.  Shortness of breath has developed over the past several days-worse with exertion and while laying flat.  Denied fever, chills, or sick contacts.  Denied swelling of lower extremities.  Patient had plan to go to urgent treatment center on Sunday morning when he began to feel exceptionally short of breath and called EMS.  Upon EMS arrival patient was described as having severe respiratory distress with tripoding, SpO2 was 70% on room air.  Patient received breathing treatment and was tried on BiPAP prior to ED arrival without improvement in symptoms.  Upon arrival to the ED, patient's SpO2 was 94% on a 15 L nonrebreather.  Patient was tachycardic with a heart rate of 118. Patient received 80 mg IV Lasix in the emergency department with able to be weaned to high flow nasal cannula. He is admitted to the intensive care unit for acute respiratory failure with hypoxia.     Hospital Course    Patient was admitted to the intensive care unit for suspected decompensated congestive heart failure.  He was initiated on diuretic therapy.  Cardiology was consulted given extremely elevated high-sensitivity troponin of 888, EKG did not show evidence of acute ischemia requiring cardiac Cath Lab.  Throughout the night, patient began to become increasingly hypoxic,  acidotic, and hypotensive.  He was intolerant of BiPAP therapy.  Given decompensation, a stat bedside echocardiogram was ordered.  Brief bedside echo by intensivist revealed significant extension of the internal jugular veins, and cardiac images showed weak ejection fraction.  Patient was initiated on dopamine, norepinephrine, epinephrine for inotropic support and vasopressor support.  He subsequently decompensated to cardiac arrest.  Throughout arrest, patient developed PEA, ventricular fibrillation, ventricular tachycardia.  Multiple antiarrhythmic medications and ACLS medications were provided without return of circulation.  After 30 minutes of high-quality CPR and ACLS protocol, patient remained asystole and was pronounced dead at 614.      Condition on Discharge:   : Date and Time of Death: 2024 at 6:14 AM       AGATHA Murguia  24  19:12 EDT    Time: I spent UNDER 30mins in the discharge planning of this patient.    Some of this encounter note is an electronic transcription/translation of spoken language to printed text.       Electronically signed by Nyasia Faustin APRN at 24 1916

## 2024-04-17 RX ORDER — GLIPIZIDE 5 MG/1
TABLET ORAL
Qty: 90 TABLET | Refills: 3 | OUTPATIENT
Start: 2024-04-17

## 2024-04-25 NOTE — PROGRESS NOTES
"Enter Query Response Below      Query Response: Acute on chronic diastolic and systolic heart failure              If applicable, please update the problem list.     Patient: Niraj Hassan \"Jose\"        : 1945  Account: [id number]           Admit Date: 2024        How to Respond to this query:       a. Click New Note     b. Answer query within the yellow box.                c. Update the Problem List, if applicable.      If you have any questions about this query contact me at: [phone]     Dr. Pepe:    78-year-old male with history of coronary artery disease, diabetes mellitus, hyperlipidemia, hypertension, and peripheral arterial disease, presented  with shortness of breath.  Labs include proBNP 10,180. Chest x-ray showed pulmonary infiltrate with a pattern suggestive of pulmonary edema.  Most recent echocardiogram from 2022 shows an LVEF of 47.3%.  Progress notes include \"congestive heart failure with decompensation.\"  Patient was given furosemide IV on .  Patient began to become increasingly hypoxic, acidotic, and hypotensive.  Brief bedside echo by intensivist revealed significant extension of the internal jugular veins, and cardiac images showed weak ejection fraction.  Patient decompensated to cardiac arrest and  .     Please clarify if patient treated/monitored for one or more of the following:    Acute on chronic diastolic heart failure  Acute on chronic diastolic and systolic heart failure  Other- specify_____  Unable to determine      By submitting this query, we are merely seeking further clarification of documentation to accurately reflect all conditions that you are monitoring, evaluating, treating or that extend the hospitalization or utilize additional resources of care. Please utilize your independent clinical judgment when addressing the question(s) above.     This query and your response, once completed, will be entered into the legal medical " record.    Sincerely,  Malika Leal RN, MSN  Clinical Documentation Integrity Program   [email or web address]

## (undated) DEVICE — ANTIBACTERIAL UNDYED BRAIDED (POLYGLACTIN 910), SYNTHETIC ABSORBABLE SUTURE: Brand: COATED VICRYL

## (undated) DEVICE — PK ANGIO CERBRL RAD 40

## (undated) DEVICE — CVR PROB 96IN LF STRL

## (undated) DEVICE — SUT PROLN 5/0 RB1 D/A 36IN 8556H

## (undated) DEVICE — PK AAA 40

## (undated) DEVICE — ISOLATION BAG: Brand: CONVERTORS

## (undated) DEVICE — TOTAL TRAY, 16FR 10ML SIL FOLEY, URN: Brand: MEDLINE

## (undated) DEVICE — CATH SZ ACCUVU SEG/20CM PIG .038IN 5F 70CM

## (undated) DEVICE — STPCK 3WY D201 DISCOFIX

## (undated) DEVICE — SUT SILK 4/0 TIES 18IN A183H

## (undated) DEVICE — GOWN,REINF,POLY,SIRUS,BRTH SLV,XLNG/XXL: Brand: MEDLINE

## (undated) DEVICE — EQUIPMENT COVER BAG TYPE 48” X 36” (122CM X 91CM): Brand: EQUIPMENT COVER BAG TYPE

## (undated) DEVICE — CATH IV INSYTE AUTOGARD SHLD 16G 1 1/4

## (undated) DEVICE — BLOOD COLLECTION/INFUSION SET,FEMALE LUER, 12 INCH (30.5 CM) TUBING: Brand: MONOJECT

## (undated) DEVICE — RADIFOCUS TORQUE DEVICE MULTI-TORQUE VISE: Brand: RADIFOCUS TORQUE DEVICE

## (undated) DEVICE — TBG PRESS EXCITE INJ HPF1200 CLR 100IN

## (undated) DEVICE — DRP SLUSH WARMR MACH CIR 44X44IN

## (undated) DEVICE — SOL NS 500ML

## (undated) DEVICE — BITEBLOCK OMNI BLOC

## (undated) DEVICE — STPLR SKIN VISISTAT WD 35CT

## (undated) DEVICE — WIPE INST MEROCEL

## (undated) DEVICE — Device

## (undated) DEVICE — CATH IV INSYTE AUTOGARD SHLD 20G 1IN PNK

## (undated) DEVICE — SYS PERFUS SEP PLATLT W TIPS CUST

## (undated) DEVICE — APPL CHLORAPREP HI/LITE 26ML ORNG

## (undated) DEVICE — VIAL FORMLN CAP 10PCT 20ML

## (undated) DEVICE — PK ATS CUST W CARDIOTOMY RESEVOIR

## (undated) DEVICE — EXTENSION SET, MALE LUER LOCK ADAPTER WITH RETRACTABLE COLLAR

## (undated) DEVICE — TRAP FLD MINIVAC MEGADYNE 100ML

## (undated) DEVICE — PATIENT RETURN ELECTRODE, SINGLE-USE, CONTACT QUALITY MONITORING, ADULT, WITH 9FT CORD, FOR PATIENTS WEIGING OVER 33LBS. (15KG): Brand: MEGADYNE

## (undated) DEVICE — GLV SURG BIOGEL LTX PF 8 1/2

## (undated) DEVICE — TBG PENCL TELESCP MEGADYNE SMOKE EVAC 10FT

## (undated) DEVICE — UNDERGLV SURG BIOGEL INDICAT PI SZ8.5 BLU

## (undated) DEVICE — CATH IV INSYTE AUTOGARD SHLD 18G 1.25IN GRN

## (undated) DEVICE — SPNG GZ WOVN 4X4IN 12PLY 10/BX STRL

## (undated) DEVICE — 3M™ IOBAN™ 2 ANTIMICROBIAL INCISE DRAPE 6648EZ: Brand: IOBAN™ 2

## (undated) DEVICE — PTA BALLOON DILATATION CATHETER: Brand: MUSTANG™

## (undated) DEVICE — GOWN ,SIRUS,NONREINFORCED 3XL: Brand: MEDLINE

## (undated) DEVICE — INFLATION DEVICE: Brand: ENCORE™ 26

## (undated) DEVICE — CATH GUIDE SOFTVU SELECT/V HT OMNI .038 5F 65CM

## (undated) DEVICE — KT ORCA ORCAPOD DISP STRL

## (undated) DEVICE — SYRINGE KIT,PACKAGED,,150FT,MK 7(ANGIO-ARTERION, 150ML SYR KIT W/QFT,MC)(60729385): Brand: MEDRAD® MARK 7 ARTERION DISPOSABLE SYRINGE 150 ML WITH QUICK FILL TUBE

## (undated) DEVICE — STERILE ULTRASOUND GEL, SAFEWRAP: Brand: ECOVUE

## (undated) DEVICE — SUT SILK 3/0 TIES 18IN A184H

## (undated) DEVICE — DESTINATION RENAL GUIDING SHEATH: Brand: DESTINATION

## (undated) DEVICE — SUT VIC 3/0 CTI 36IN J944H

## (undated) DEVICE — DRSNG WND GZ PAD BORDERED 4X8IN STRL

## (undated) DEVICE — SOL NACL 0.9PCT 1000ML

## (undated) DEVICE — SUT PROLN 6/0 BV1 D/A 30IN 8709H

## (undated) DEVICE — SINGLE-USE BIOPSY FORCEPS: Brand: RADIAL JAW 4

## (undated) DEVICE — SUT SILK 2/0 SH CR5 18IN C0125

## (undated) DEVICE — SUT SILK 2/0 TIES 18IN A185H

## (undated) DEVICE — MSK ENDO PORT O2 POM ELITE CURAPLEX A/

## (undated) DEVICE — PERCLOSE PROGLIDE™ SUTURE-MEDIATED CLOSURE SYSTEM: Brand: PERCLOSE PROGLIDE™

## (undated) DEVICE — NDL PERC 1PRT THNWALL W/BASEPLT 18G 7CM

## (undated) DEVICE — PENCL E/S ULTRAVAC TELESCP NOSE HOLSTR 10FT